# Patient Record
Sex: FEMALE | Race: WHITE | NOT HISPANIC OR LATINO | Employment: OTHER | URBAN - METROPOLITAN AREA
[De-identification: names, ages, dates, MRNs, and addresses within clinical notes are randomized per-mention and may not be internally consistent; named-entity substitution may affect disease eponyms.]

---

## 2017-02-14 ENCOUNTER — TRANSCRIBE ORDERS (OUTPATIENT)
Dept: ADMINISTRATIVE | Facility: HOSPITAL | Age: 57
End: 2017-02-14

## 2017-02-14 DIAGNOSIS — Z12.31 VISIT FOR SCREENING MAMMOGRAM: Primary | ICD-10-CM

## 2017-02-23 ENCOUNTER — HOSPITAL ENCOUNTER (OUTPATIENT)
Dept: MAMMOGRAPHY | Facility: HOSPITAL | Age: 57
Discharge: HOME | End: 2017-02-23
Attending: OBSTETRICS & GYNECOLOGY | Admitting: OBSTETRICS & GYNECOLOGY

## 2017-02-23 DIAGNOSIS — Z12.31 VISIT FOR SCREENING MAMMOGRAM: ICD-10-CM

## 2017-02-23 PROCEDURE — 77063 BREAST TOMOSYNTHESIS BI: CPT

## 2017-02-23 PROCEDURE — 77063 BREAST TOMOSYNTHESIS BI: CPT | Performed by: RADIOLOGY

## 2017-02-23 PROCEDURE — G0202 SCR MAMMO BI INCL CAD: HCPCS

## 2017-02-23 PROCEDURE — 77067 SCR MAMMO BI INCL CAD: CPT | Performed by: RADIOLOGY

## 2017-03-07 ENCOUNTER — APPOINTMENT (OUTPATIENT)
Dept: MAMMOGRAPHY | Facility: HOSPITAL | Age: 57
End: 2017-03-07

## 2017-03-14 ENCOUNTER — HOSPITAL ENCOUNTER (OUTPATIENT)
Dept: MAMMOGRAPHY | Facility: HOSPITAL | Age: 57
Discharge: HOME | End: 2017-03-14
Attending: OBSTETRICS & GYNECOLOGY | Admitting: OBSTETRICS & GYNECOLOGY

## 2017-03-14 DIAGNOSIS — R92.8 ABNORMAL MAMMOGRAM: ICD-10-CM

## 2017-03-14 PROCEDURE — G0206 DX MAMMO INCL CAD UNI: HCPCS

## 2017-03-14 PROCEDURE — G0279 TOMOSYNTHESIS, MAMMO: HCPCS

## 2017-03-14 PROCEDURE — 77065 DX MAMMO INCL CAD UNI: CPT | Performed by: RADIOLOGY

## 2017-03-14 PROCEDURE — 77061 BREAST TOMOSYNTHESIS UNI: CPT | Performed by: RADIOLOGY

## 2018-10-30 ENCOUNTER — TRANSCRIBE ORDERS (OUTPATIENT)
Dept: MAMMOGRAPHY | Facility: HOSPITAL | Age: 58
End: 2018-10-30

## 2018-10-30 DIAGNOSIS — Z12.31 VISIT FOR SCREENING MAMMOGRAM: Primary | ICD-10-CM

## 2018-11-12 ENCOUNTER — APPOINTMENT (OUTPATIENT)
Dept: MAMMOGRAPHY | Facility: HOSPITAL | Age: 58
End: 2018-11-12
Attending: OBSTETRICS & GYNECOLOGY

## 2019-12-19 ENCOUNTER — HOSPITAL ENCOUNTER (INPATIENT)
Facility: HOSPITAL | Age: 59
LOS: 4 days | Discharge: HOME | End: 2019-12-23
Attending: EMERGENCY MEDICINE | Admitting: INTERNAL MEDICINE

## 2019-12-19 ENCOUNTER — APPOINTMENT (OUTPATIENT)
Dept: CT IMAGING | Facility: HOSPITAL | Age: 59
End: 2019-12-19

## 2019-12-19 DIAGNOSIS — K57.32 DIVERTICULITIS OF LARGE INTESTINE WITHOUT BLEEDING, UNSPECIFIED COMPLICATION STATUS: Primary | ICD-10-CM

## 2019-12-19 DIAGNOSIS — K57.92 DIVERTICULITIS: ICD-10-CM

## 2019-12-19 PROBLEM — I10 ESSENTIAL HYPERTENSION: Status: ACTIVE | Noted: 2019-12-19

## 2019-12-19 PROBLEM — E87.6 HYPOKALEMIA: Status: ACTIVE | Noted: 2019-12-19

## 2019-12-19 PROBLEM — F32.A DEPRESSION: Status: ACTIVE | Noted: 2019-12-19

## 2019-12-19 PROBLEM — N17.9 AKI (ACUTE KIDNEY INJURY) (HCC): Status: ACTIVE | Noted: 2019-12-19

## 2019-12-19 PROBLEM — K86.89 PANCREATIC INSUFFICIENCY: Status: ACTIVE | Noted: 2019-12-19

## 2019-12-19 LAB
ALBUMIN SERPL-MCNC: 4.1 G/DL (ref 3.5–5.2)
ALBUMIN/GLOB SERPL: 1.6 G/DL
ALP SERPL-CCNC: 79 U/L (ref 39–117)
ALT SERPL W P-5'-P-CCNC: 7 U/L (ref 1–33)
ANION GAP SERPL CALCULATED.3IONS-SCNC: 13 MMOL/L (ref 5–15)
AST SERPL-CCNC: 11 U/L (ref 1–32)
BASOPHILS # BLD AUTO: 0.03 10*3/MM3 (ref 0–0.2)
BASOPHILS NFR BLD AUTO: 0.3 % (ref 0–1.5)
BILIRUB SERPL-MCNC: 0.4 MG/DL (ref 0.2–1.2)
BUN BLD-MCNC: 11 MG/DL (ref 6–20)
BUN BLDA-MCNC: 11 MG/DL (ref 8–26)
BUN/CREAT SERPL: 10 (ref 7–25)
CA-I BLDA-SCNC: 1.17 MMOL/L (ref 1.2–1.32)
CALCIUM SPEC-SCNC: 9.6 MG/DL (ref 8.6–10.5)
CHLORIDE BLDA-SCNC: 101 MMOL/L (ref 98–109)
CHLORIDE SERPL-SCNC: 99 MMOL/L (ref 98–107)
CO2 BLDA-SCNC: 24 MMOL/L (ref 24–29)
CO2 SERPL-SCNC: 23 MMOL/L (ref 22–29)
CREAT BLD-MCNC: 1.1 MG/DL (ref 0.57–1)
CREAT BLDA-MCNC: 1.3 MG/DL (ref 0.6–1.3)
D-LACTATE SERPL-SCNC: 0.9 MMOL/L (ref 0.5–2)
DEPRECATED RDW RBC AUTO: 42.8 FL (ref 37–54)
EOSINOPHIL # BLD AUTO: 0.15 10*3/MM3 (ref 0–0.4)
EOSINOPHIL NFR BLD AUTO: 1.5 % (ref 0.3–6.2)
ERYTHROCYTE [DISTWIDTH] IN BLOOD BY AUTOMATED COUNT: 13.2 % (ref 12.3–15.4)
GFR SERPL CREATININE-BSD FRML MDRD: 51 ML/MIN/1.73
GLOBULIN UR ELPH-MCNC: 2.6 GM/DL
GLUCOSE BLD-MCNC: 131 MG/DL (ref 65–99)
GLUCOSE BLDC GLUCOMTR-MCNC: 130 MG/DL (ref 70–130)
HCT VFR BLD AUTO: 35.3 % (ref 34–46.6)
HCT VFR BLDA CALC: 35 % (ref 38–51)
HGB BLD-MCNC: 11.8 G/DL (ref 12–15.9)
HGB BLDA-MCNC: 11.9 G/DL (ref 12–17)
IMM GRANULOCYTES # BLD AUTO: 0.02 10*3/MM3 (ref 0–0.05)
IMM GRANULOCYTES NFR BLD AUTO: 0.2 % (ref 0–0.5)
LIPASE SERPL-CCNC: 21 U/L (ref 13–60)
LYMPHOCYTES # BLD AUTO: 3.14 10*3/MM3 (ref 0.7–3.1)
LYMPHOCYTES NFR BLD AUTO: 30.7 % (ref 19.6–45.3)
MAGNESIUM SERPL-MCNC: 1.9 MG/DL (ref 1.6–2.6)
MCH RBC QN AUTO: 29.4 PG (ref 26.6–33)
MCHC RBC AUTO-ENTMCNC: 33.4 G/DL (ref 31.5–35.7)
MCV RBC AUTO: 88 FL (ref 79–97)
MONOCYTES # BLD AUTO: 0.81 10*3/MM3 (ref 0.1–0.9)
MONOCYTES NFR BLD AUTO: 7.9 % (ref 5–12)
NEUTROPHILS # BLD AUTO: 6.07 10*3/MM3 (ref 1.7–7)
NEUTROPHILS NFR BLD AUTO: 59.4 % (ref 42.7–76)
NRBC BLD AUTO-RTO: 0 /100 WBC (ref 0–0.2)
PLATELET # BLD AUTO: 298 10*3/MM3 (ref 140–450)
PMV BLD AUTO: 11.4 FL (ref 6–12)
POTASSIUM BLD-SCNC: 3.5 MMOL/L (ref 3.5–5.2)
POTASSIUM BLDA-SCNC: 3.4 MMOL/L (ref 3.5–4.9)
PROCALCITONIN SERPL-MCNC: 0.06 NG/ML (ref 0.1–0.25)
PROT SERPL-MCNC: 6.7 G/DL (ref 6–8.5)
RBC # BLD AUTO: 4.01 10*6/MM3 (ref 3.77–5.28)
SODIUM BLD-SCNC: 135 MMOL/L (ref 136–145)
SODIUM BLDA-SCNC: 135 MMOL/L (ref 138–146)
WBC NRBC COR # BLD: 10.22 10*3/MM3 (ref 3.4–10.8)

## 2019-12-19 PROCEDURE — 99223 1ST HOSP IP/OBS HIGH 75: CPT | Performed by: INTERNAL MEDICINE

## 2019-12-19 PROCEDURE — 87040 BLOOD CULTURE FOR BACTERIA: CPT | Performed by: PHYSICIAN ASSISTANT

## 2019-12-19 PROCEDURE — 25010000002 HYDROMORPHONE PER 4 MG: Performed by: EMERGENCY MEDICINE

## 2019-12-19 PROCEDURE — 74176 CT ABD & PELVIS W/O CONTRAST: CPT

## 2019-12-19 PROCEDURE — 83690 ASSAY OF LIPASE: CPT | Performed by: PHYSICIAN ASSISTANT

## 2019-12-19 PROCEDURE — 83735 ASSAY OF MAGNESIUM: CPT | Performed by: INTERNAL MEDICINE

## 2019-12-19 PROCEDURE — 83605 ASSAY OF LACTIC ACID: CPT | Performed by: PHYSICIAN ASSISTANT

## 2019-12-19 PROCEDURE — 80047 BASIC METABLC PNL IONIZED CA: CPT

## 2019-12-19 PROCEDURE — 84145 PROCALCITONIN (PCT): CPT | Performed by: PHYSICIAN ASSISTANT

## 2019-12-19 PROCEDURE — 99285 EMERGENCY DEPT VISIT HI MDM: CPT

## 2019-12-19 PROCEDURE — 85025 COMPLETE CBC W/AUTO DIFF WBC: CPT | Performed by: PHYSICIAN ASSISTANT

## 2019-12-19 PROCEDURE — 85014 HEMATOCRIT: CPT

## 2019-12-19 PROCEDURE — 25010000002 ONDANSETRON PER 1 MG: Performed by: PHYSICIAN ASSISTANT

## 2019-12-19 PROCEDURE — 25010000002 HYDROMORPHONE 1 MG/ML SOLUTION: Performed by: EMERGENCY MEDICINE

## 2019-12-19 PROCEDURE — 25010000002 PIPERACILLIN SOD-TAZOBACTAM PER 1 G: Performed by: INTERNAL MEDICINE

## 2019-12-19 PROCEDURE — 80053 COMPREHEN METABOLIC PANEL: CPT | Performed by: PHYSICIAN ASSISTANT

## 2019-12-19 RX ORDER — NALOXONE HCL 0.4 MG/ML
0.4 VIAL (ML) INJECTION
Status: DISCONTINUED | OUTPATIENT
Start: 2019-12-19 | End: 2019-12-22

## 2019-12-19 RX ORDER — OXYBUTYNIN CHLORIDE 5 MG/1
10 TABLET, EXTENDED RELEASE ORAL DAILY
Status: DISCONTINUED | OUTPATIENT
Start: 2019-12-20 | End: 2019-12-23 | Stop reason: HOSPADM

## 2019-12-19 RX ORDER — MORPHINE SULFATE 2 MG/ML
2 INJECTION, SOLUTION INTRAMUSCULAR; INTRAVENOUS ONCE
Status: DISCONTINUED | OUTPATIENT
Start: 2019-12-19 | End: 2019-12-22

## 2019-12-19 RX ORDER — OXYBUTYNIN CHLORIDE 10 MG/1
10 TABLET, EXTENDED RELEASE ORAL DAILY
COMMUNITY
End: 2020-09-11

## 2019-12-19 RX ORDER — CITALOPRAM 40 MG/1
40 TABLET ORAL DAILY
Status: DISCONTINUED | OUTPATIENT
Start: 2019-12-20 | End: 2019-12-23 | Stop reason: HOSPADM

## 2019-12-19 RX ORDER — HYDROMORPHONE HYDROCHLORIDE 1 MG/ML
0.5 INJECTION, SOLUTION INTRAMUSCULAR; INTRAVENOUS; SUBCUTANEOUS
Status: DISCONTINUED | OUTPATIENT
Start: 2019-12-19 | End: 2019-12-22

## 2019-12-19 RX ORDER — DICYCLOMINE HYDROCHLORIDE 10 MG/1
10 CAPSULE ORAL 3 TIMES DAILY
COMMUNITY
End: 2019-12-23 | Stop reason: HOSPADM

## 2019-12-19 RX ORDER — POTASSIUM CHLORIDE 7.45 MG/ML
10 INJECTION INTRAVENOUS
Status: DISCONTINUED | OUTPATIENT
Start: 2019-12-19 | End: 2019-12-23 | Stop reason: HOSPADM

## 2019-12-19 RX ORDER — ASPIRIN 81 MG/1
81 TABLET ORAL DAILY
COMMUNITY
End: 2020-09-11

## 2019-12-19 RX ORDER — SODIUM CHLORIDE 9 MG/ML
75 INJECTION, SOLUTION INTRAVENOUS CONTINUOUS
Status: ACTIVE | OUTPATIENT
Start: 2019-12-19 | End: 2019-12-20

## 2019-12-19 RX ORDER — MAGNESIUM SULFATE HEPTAHYDRATE 40 MG/ML
4 INJECTION, SOLUTION INTRAVENOUS AS NEEDED
Status: DISCONTINUED | OUTPATIENT
Start: 2019-12-19 | End: 2019-12-23 | Stop reason: HOSPADM

## 2019-12-19 RX ORDER — ONDANSETRON 2 MG/ML
4 INJECTION INTRAMUSCULAR; INTRAVENOUS EVERY 6 HOURS PRN
Status: DISCONTINUED | OUTPATIENT
Start: 2019-12-19 | End: 2019-12-23 | Stop reason: HOSPADM

## 2019-12-19 RX ORDER — SODIUM CHLORIDE 0.9 % (FLUSH) 0.9 %
10 SYRINGE (ML) INJECTION AS NEEDED
Status: DISCONTINUED | OUTPATIENT
Start: 2019-12-19 | End: 2019-12-23 | Stop reason: HOSPADM

## 2019-12-19 RX ORDER — MAGNESIUM SULFATE HEPTAHYDRATE 40 MG/ML
2 INJECTION, SOLUTION INTRAVENOUS AS NEEDED
Status: DISCONTINUED | OUTPATIENT
Start: 2019-12-19 | End: 2019-12-23 | Stop reason: HOSPADM

## 2019-12-19 RX ORDER — POTASSIUM CHLORIDE 750 MG/1
40 CAPSULE, EXTENDED RELEASE ORAL AS NEEDED
Status: DISCONTINUED | OUTPATIENT
Start: 2019-12-19 | End: 2019-12-23 | Stop reason: HOSPADM

## 2019-12-19 RX ORDER — ONDANSETRON 2 MG/ML
4 INJECTION INTRAMUSCULAR; INTRAVENOUS ONCE
Status: COMPLETED | OUTPATIENT
Start: 2019-12-19 | End: 2019-12-19

## 2019-12-19 RX ORDER — ONDANSETRON 4 MG/1
4 TABLET, FILM COATED ORAL EVERY 6 HOURS PRN
Status: DISCONTINUED | OUTPATIENT
Start: 2019-12-19 | End: 2019-12-23 | Stop reason: HOSPADM

## 2019-12-19 RX ORDER — ONDANSETRON HYDROCHLORIDE 8 MG/1
8 TABLET, FILM COATED ORAL EVERY 8 HOURS PRN
COMMUNITY
End: 2023-02-23

## 2019-12-19 RX ORDER — SODIUM CHLORIDE 0.9 % (FLUSH) 0.9 %
10 SYRINGE (ML) INJECTION EVERY 12 HOURS SCHEDULED
Status: DISCONTINUED | OUTPATIENT
Start: 2019-12-20 | End: 2019-12-23 | Stop reason: HOSPADM

## 2019-12-19 RX ORDER — VITAMIN E 268 MG
400 CAPSULE ORAL DAILY
COMMUNITY
End: 2019-12-23 | Stop reason: HOSPADM

## 2019-12-19 RX ORDER — CYCLOBENZAPRINE HCL 10 MG
10 TABLET ORAL 3 TIMES DAILY PRN
COMMUNITY
End: 2020-11-10 | Stop reason: HOSPADM

## 2019-12-19 RX ORDER — CITALOPRAM 40 MG/1
40 TABLET ORAL DAILY
COMMUNITY
End: 2022-03-21

## 2019-12-19 RX ORDER — HYDROMORPHONE HYDROCHLORIDE 1 MG/ML
0.5 INJECTION, SOLUTION INTRAMUSCULAR; INTRAVENOUS; SUBCUTANEOUS ONCE
Status: COMPLETED | OUTPATIENT
Start: 2019-12-19 | End: 2019-12-19

## 2019-12-19 RX ORDER — AMOXICILLIN AND CLAVULANATE POTASSIUM 875; 125 MG/1; MG/1
1 TABLET, FILM COATED ORAL 2 TIMES DAILY
COMMUNITY
End: 2019-12-23 | Stop reason: HOSPADM

## 2019-12-19 RX ORDER — CYCLOBENZAPRINE HCL 10 MG
10 TABLET ORAL 3 TIMES DAILY PRN
Status: DISCONTINUED | OUTPATIENT
Start: 2019-12-19 | End: 2019-12-23 | Stop reason: HOSPADM

## 2019-12-19 RX ORDER — BUDESONIDE 3 MG/1
6 CAPSULE, COATED PELLETS ORAL EVERY MORNING
COMMUNITY
End: 2020-09-11

## 2019-12-19 RX ORDER — POTASSIUM CHLORIDE 1.5 G/1.77G
40 POWDER, FOR SOLUTION ORAL AS NEEDED
Status: DISCONTINUED | OUTPATIENT
Start: 2019-12-19 | End: 2019-12-23 | Stop reason: HOSPADM

## 2019-12-19 RX ADMIN — ONDANSETRON 4 MG: 2 INJECTION INTRAMUSCULAR; INTRAVENOUS at 19:20

## 2019-12-19 RX ADMIN — SODIUM CHLORIDE, POTASSIUM CHLORIDE, SODIUM LACTATE AND CALCIUM CHLORIDE 1641 ML: 600; 310; 30; 20 INJECTION, SOLUTION INTRAVENOUS at 19:18

## 2019-12-19 RX ADMIN — HYDROMORPHONE HYDROCHLORIDE 0.5 MG: 1 INJECTION, SOLUTION INTRAMUSCULAR; INTRAVENOUS; SUBCUTANEOUS at 22:29

## 2019-12-19 RX ADMIN — TAZOBACTAM SODIUM AND PIPERACILLIN SODIUM 3.38 G: 375; 3 INJECTION, SOLUTION INTRAVENOUS at 21:42

## 2019-12-19 RX ADMIN — HYDROMORPHONE HYDROCHLORIDE 1 MG: 1 INJECTION, SOLUTION INTRAMUSCULAR; INTRAVENOUS; SUBCUTANEOUS at 19:20

## 2019-12-20 LAB
ALBUMIN SERPL-MCNC: 3.4 G/DL (ref 3.5–5.2)
ALBUMIN/GLOB SERPL: 1.3 G/DL
ALP SERPL-CCNC: 71 U/L (ref 39–117)
ALT SERPL W P-5'-P-CCNC: 6 U/L (ref 1–33)
ANION GAP SERPL CALCULATED.3IONS-SCNC: 10 MMOL/L (ref 5–15)
AST SERPL-CCNC: 10 U/L (ref 1–32)
BASOPHILS # BLD AUTO: 0.02 10*3/MM3 (ref 0–0.2)
BASOPHILS NFR BLD AUTO: 0.2 % (ref 0–1.5)
BILIRUB SERPL-MCNC: 0.5 MG/DL (ref 0.2–1.2)
BUN BLD-MCNC: 9 MG/DL (ref 6–20)
BUN/CREAT SERPL: 8.8 (ref 7–25)
CALCIUM SPEC-SCNC: 9 MG/DL (ref 8.6–10.5)
CHLORIDE SERPL-SCNC: 105 MMOL/L (ref 98–107)
CO2 SERPL-SCNC: 25 MMOL/L (ref 22–29)
CREAT BLD-MCNC: 1.02 MG/DL (ref 0.57–1)
DEPRECATED RDW RBC AUTO: 45 FL (ref 37–54)
EOSINOPHIL # BLD AUTO: 0.15 10*3/MM3 (ref 0–0.4)
EOSINOPHIL NFR BLD AUTO: 1.7 % (ref 0.3–6.2)
ERYTHROCYTE [DISTWIDTH] IN BLOOD BY AUTOMATED COUNT: 13.3 % (ref 12.3–15.4)
GFR SERPL CREATININE-BSD FRML MDRD: 56 ML/MIN/1.73
GLOBULIN UR ELPH-MCNC: 2.7 GM/DL
GLUCOSE BLD-MCNC: 95 MG/DL (ref 65–99)
HCT VFR BLD AUTO: 33 % (ref 34–46.6)
HGB BLD-MCNC: 10.8 G/DL (ref 12–15.9)
IMM GRANULOCYTES # BLD AUTO: 0.03 10*3/MM3 (ref 0–0.05)
IMM GRANULOCYTES NFR BLD AUTO: 0.3 % (ref 0–0.5)
LIPASE SERPL-CCNC: 17 U/L (ref 13–60)
LYMPHOCYTES # BLD AUTO: 2.54 10*3/MM3 (ref 0.7–3.1)
LYMPHOCYTES NFR BLD AUTO: 28.7 % (ref 19.6–45.3)
MAGNESIUM SERPL-MCNC: 2.2 MG/DL (ref 1.6–2.6)
MCH RBC QN AUTO: 29.9 PG (ref 26.6–33)
MCHC RBC AUTO-ENTMCNC: 32.7 G/DL (ref 31.5–35.7)
MCV RBC AUTO: 91.4 FL (ref 79–97)
MONOCYTES # BLD AUTO: 0.75 10*3/MM3 (ref 0.1–0.9)
MONOCYTES NFR BLD AUTO: 8.5 % (ref 5–12)
NEUTROPHILS # BLD AUTO: 5.36 10*3/MM3 (ref 1.7–7)
NEUTROPHILS NFR BLD AUTO: 60.6 % (ref 42.7–76)
NRBC BLD AUTO-RTO: 0 /100 WBC (ref 0–0.2)
NT-PROBNP SERPL-MCNC: 224.4 PG/ML (ref 5–900)
PHOSPHATE SERPL-MCNC: 3.6 MG/DL (ref 2.5–4.5)
PLATELET # BLD AUTO: 259 10*3/MM3 (ref 140–450)
PMV BLD AUTO: 11.2 FL (ref 6–12)
POTASSIUM BLD-SCNC: 3.9 MMOL/L (ref 3.5–5.2)
PROCALCITONIN SERPL-MCNC: 0.05 NG/ML (ref 0.1–0.25)
PROT SERPL-MCNC: 6.1 G/DL (ref 6–8.5)
RBC # BLD AUTO: 3.61 10*6/MM3 (ref 3.77–5.28)
SODIUM BLD-SCNC: 140 MMOL/L (ref 136–145)
WBC NRBC COR # BLD: 8.85 10*3/MM3 (ref 3.4–10.8)

## 2019-12-20 PROCEDURE — 83690 ASSAY OF LIPASE: CPT | Performed by: PHYSICIAN ASSISTANT

## 2019-12-20 PROCEDURE — 80053 COMPREHEN METABOLIC PANEL: CPT | Performed by: PHYSICIAN ASSISTANT

## 2019-12-20 PROCEDURE — 84100 ASSAY OF PHOSPHORUS: CPT | Performed by: PHYSICIAN ASSISTANT

## 2019-12-20 PROCEDURE — 99233 SBSQ HOSP IP/OBS HIGH 50: CPT | Performed by: INTERNAL MEDICINE

## 2019-12-20 PROCEDURE — 83735 ASSAY OF MAGNESIUM: CPT | Performed by: PHYSICIAN ASSISTANT

## 2019-12-20 PROCEDURE — 83880 ASSAY OF NATRIURETIC PEPTIDE: CPT | Performed by: PHYSICIAN ASSISTANT

## 2019-12-20 PROCEDURE — 84145 PROCALCITONIN (PCT): CPT | Performed by: PHYSICIAN ASSISTANT

## 2019-12-20 PROCEDURE — 25010000002 PIPERACILLIN SOD-TAZOBACTAM PER 1 G: Performed by: PHYSICIAN ASSISTANT

## 2019-12-20 PROCEDURE — 25010000002 HYDROMORPHONE PER 4 MG: Performed by: PHYSICIAN ASSISTANT

## 2019-12-20 PROCEDURE — 25010000002 ONDANSETRON PER 1 MG: Performed by: PHYSICIAN ASSISTANT

## 2019-12-20 PROCEDURE — 85025 COMPLETE CBC W/AUTO DIFF WBC: CPT | Performed by: PHYSICIAN ASSISTANT

## 2019-12-20 RX ORDER — OXYCODONE HYDROCHLORIDE AND ACETAMINOPHEN 5; 325 MG/1; MG/1
1 TABLET ORAL EVERY 4 HOURS PRN
Status: DISCONTINUED | OUTPATIENT
Start: 2019-12-20 | End: 2019-12-23 | Stop reason: HOSPADM

## 2019-12-20 RX ADMIN — OXYCODONE HYDROCHLORIDE AND ACETAMINOPHEN 1 TABLET: 5; 325 TABLET ORAL at 22:08

## 2019-12-20 RX ADMIN — TAZOBACTAM SODIUM AND PIPERACILLIN SODIUM 3.38 G: 375; 3 INJECTION, SOLUTION INTRAVENOUS at 09:39

## 2019-12-20 RX ADMIN — HYDROMORPHONE HYDROCHLORIDE 0.5 MG: 1 INJECTION, SOLUTION INTRAMUSCULAR; INTRAVENOUS; SUBCUTANEOUS at 03:36

## 2019-12-20 RX ADMIN — CITALOPRAM HYDROBROMIDE 40 MG: 40 TABLET ORAL at 09:39

## 2019-12-20 RX ADMIN — PANCRELIPASE 24000 UNITS OF LIPASE: 60000; 12000; 38000 CAPSULE, DELAYED RELEASE PELLETS ORAL at 17:18

## 2019-12-20 RX ADMIN — SODIUM CHLORIDE, PRESERVATIVE FREE 10 ML: 5 INJECTION INTRAVENOUS at 09:39

## 2019-12-20 RX ADMIN — SODIUM CHLORIDE 75 ML/HR: 9 INJECTION, SOLUTION INTRAVENOUS at 00:15

## 2019-12-20 RX ADMIN — OXYBUTYNIN CHLORIDE 10 MG: 5 TABLET, EXTENDED RELEASE ORAL at 09:38

## 2019-12-20 RX ADMIN — SODIUM CHLORIDE, PRESERVATIVE FREE 10 ML: 5 INJECTION INTRAVENOUS at 00:15

## 2019-12-20 RX ADMIN — HYDROMORPHONE HYDROCHLORIDE 0.5 MG: 1 INJECTION, SOLUTION INTRAMUSCULAR; INTRAVENOUS; SUBCUTANEOUS at 14:30

## 2019-12-20 RX ADMIN — HYDROMORPHONE HYDROCHLORIDE 0.5 MG: 1 INJECTION, SOLUTION INTRAMUSCULAR; INTRAVENOUS; SUBCUTANEOUS at 09:53

## 2019-12-20 RX ADMIN — TAZOBACTAM SODIUM AND PIPERACILLIN SODIUM 3.38 G: 375; 3 INJECTION, SOLUTION INTRAVENOUS at 01:44

## 2019-12-20 RX ADMIN — ONDANSETRON 4 MG: 2 INJECTION INTRAMUSCULAR; INTRAVENOUS at 17:24

## 2019-12-20 RX ADMIN — ONDANSETRON 4 MG: 2 INJECTION INTRAMUSCULAR; INTRAVENOUS at 00:15

## 2019-12-20 RX ADMIN — HYDROMORPHONE HYDROCHLORIDE 0.5 MG: 1 INJECTION, SOLUTION INTRAMUSCULAR; INTRAVENOUS; SUBCUTANEOUS at 17:24

## 2019-12-20 RX ADMIN — SODIUM CHLORIDE, PRESERVATIVE FREE 10 ML: 5 INJECTION INTRAVENOUS at 22:02

## 2019-12-20 RX ADMIN — TAZOBACTAM SODIUM AND PIPERACILLIN SODIUM 3.38 G: 375; 3 INJECTION, SOLUTION INTRAVENOUS at 17:18

## 2019-12-21 PROCEDURE — 99232 SBSQ HOSP IP/OBS MODERATE 35: CPT | Performed by: INTERNAL MEDICINE

## 2019-12-21 PROCEDURE — 25010000002 HYDROMORPHONE PER 4 MG: Performed by: PHYSICIAN ASSISTANT

## 2019-12-21 PROCEDURE — 25010000002 ONDANSETRON PER 1 MG: Performed by: PHYSICIAN ASSISTANT

## 2019-12-21 PROCEDURE — 25010000002 PIPERACILLIN SOD-TAZOBACTAM PER 1 G: Performed by: PHYSICIAN ASSISTANT

## 2019-12-21 RX ADMIN — ONDANSETRON 4 MG: 2 INJECTION INTRAMUSCULAR; INTRAVENOUS at 19:54

## 2019-12-21 RX ADMIN — HYDROMORPHONE HYDROCHLORIDE 0.5 MG: 1 INJECTION, SOLUTION INTRAMUSCULAR; INTRAVENOUS; SUBCUTANEOUS at 08:25

## 2019-12-21 RX ADMIN — PANCRELIPASE 24000 UNITS OF LIPASE: 60000; 12000; 38000 CAPSULE, DELAYED RELEASE PELLETS ORAL at 08:18

## 2019-12-21 RX ADMIN — OXYCODONE HYDROCHLORIDE AND ACETAMINOPHEN 1 TABLET: 5; 325 TABLET ORAL at 05:22

## 2019-12-21 RX ADMIN — HYDROMORPHONE HYDROCHLORIDE 0.5 MG: 1 INJECTION, SOLUTION INTRAMUSCULAR; INTRAVENOUS; SUBCUTANEOUS at 19:02

## 2019-12-21 RX ADMIN — ONDANSETRON 4 MG: 2 INJECTION INTRAMUSCULAR; INTRAVENOUS at 10:16

## 2019-12-21 RX ADMIN — TAZOBACTAM SODIUM AND PIPERACILLIN SODIUM 3.38 G: 375; 3 INJECTION, SOLUTION INTRAVENOUS at 10:11

## 2019-12-21 RX ADMIN — SODIUM CHLORIDE, PRESERVATIVE FREE 10 ML: 5 INJECTION INTRAVENOUS at 08:18

## 2019-12-21 RX ADMIN — PANCRELIPASE 24000 UNITS OF LIPASE: 60000; 12000; 38000 CAPSULE, DELAYED RELEASE PELLETS ORAL at 17:00

## 2019-12-21 RX ADMIN — CITALOPRAM HYDROBROMIDE 40 MG: 40 TABLET ORAL at 08:18

## 2019-12-21 RX ADMIN — OXYCODONE HYDROCHLORIDE AND ACETAMINOPHEN 1 TABLET: 5; 325 TABLET ORAL at 10:16

## 2019-12-21 RX ADMIN — HYDROMORPHONE HYDROCHLORIDE 0.5 MG: 1 INJECTION, SOLUTION INTRAMUSCULAR; INTRAVENOUS; SUBCUTANEOUS at 13:49

## 2019-12-21 RX ADMIN — TAZOBACTAM SODIUM AND PIPERACILLIN SODIUM 3.38 G: 375; 3 INJECTION, SOLUTION INTRAVENOUS at 17:00

## 2019-12-21 RX ADMIN — TAZOBACTAM SODIUM AND PIPERACILLIN SODIUM 3.38 G: 375; 3 INJECTION, SOLUTION INTRAVENOUS at 02:00

## 2019-12-21 RX ADMIN — PANCRELIPASE 24000 UNITS OF LIPASE: 60000; 12000; 38000 CAPSULE, DELAYED RELEASE PELLETS ORAL at 13:50

## 2019-12-21 RX ADMIN — OXYBUTYNIN CHLORIDE 10 MG: 5 TABLET, EXTENDED RELEASE ORAL at 08:18

## 2019-12-22 LAB
ANION GAP SERPL CALCULATED.3IONS-SCNC: 11 MMOL/L (ref 5–15)
BASOPHILS # BLD MANUAL: 0.07 10*3/MM3 (ref 0–0.2)
BASOPHILS NFR BLD AUTO: 1 % (ref 0–1.5)
BUN BLD-MCNC: 6 MG/DL (ref 6–20)
BUN/CREAT SERPL: 6.3 (ref 7–25)
CALCIUM SPEC-SCNC: 8.8 MG/DL (ref 8.6–10.5)
CHLORIDE SERPL-SCNC: 105 MMOL/L (ref 98–107)
CO2 SERPL-SCNC: 25 MMOL/L (ref 22–29)
CREAT BLD-MCNC: 0.96 MG/DL (ref 0.57–1)
DEPRECATED RDW RBC AUTO: 44.7 FL (ref 37–54)
EOSINOPHIL # BLD MANUAL: 0.07 10*3/MM3 (ref 0–0.4)
EOSINOPHIL NFR BLD MANUAL: 1 % (ref 0.3–6.2)
ERYTHROCYTE [DISTWIDTH] IN BLOOD BY AUTOMATED COUNT: 13.2 % (ref 12.3–15.4)
GFR SERPL CREATININE-BSD FRML MDRD: 60 ML/MIN/1.73
GLUCOSE BLD-MCNC: 85 MG/DL (ref 65–99)
HCT VFR BLD AUTO: 32.4 % (ref 34–46.6)
HGB BLD-MCNC: 10.5 G/DL (ref 12–15.9)
LYMPHOCYTES # BLD MANUAL: 2.45 10*3/MM3 (ref 0.7–3.1)
LYMPHOCYTES NFR BLD MANUAL: 3 % (ref 5–12)
LYMPHOCYTES NFR BLD MANUAL: 35 % (ref 19.6–45.3)
MCH RBC QN AUTO: 29.7 PG (ref 26.6–33)
MCHC RBC AUTO-ENTMCNC: 32.4 G/DL (ref 31.5–35.7)
MCV RBC AUTO: 91.5 FL (ref 79–97)
MONOCYTES # BLD AUTO: 0.21 10*3/MM3 (ref 0.1–0.9)
NEUTROPHILS # BLD AUTO: 3.79 10*3/MM3 (ref 1.7–7)
NEUTROPHILS NFR BLD MANUAL: 54 % (ref 42.7–76)
PLAT MORPH BLD: NORMAL
PLATELET # BLD AUTO: 268 10*3/MM3 (ref 140–450)
PMV BLD AUTO: 11.2 FL (ref 6–12)
POTASSIUM BLD-SCNC: 3.7 MMOL/L (ref 3.5–5.2)
RBC # BLD AUTO: 3.54 10*6/MM3 (ref 3.77–5.28)
RBC MORPH BLD: NORMAL
SCAN SLIDE: NORMAL
SODIUM BLD-SCNC: 141 MMOL/L (ref 136–145)
VARIANT LYMPHS NFR BLD MANUAL: 6 % (ref 0–5)
WBC MORPH BLD: NORMAL
WBC NRBC COR # BLD: 7.01 10*3/MM3 (ref 3.4–10.8)

## 2019-12-22 PROCEDURE — 80048 BASIC METABOLIC PNL TOTAL CA: CPT | Performed by: INTERNAL MEDICINE

## 2019-12-22 PROCEDURE — 85007 BL SMEAR W/DIFF WBC COUNT: CPT | Performed by: INTERNAL MEDICINE

## 2019-12-22 PROCEDURE — 85025 COMPLETE CBC W/AUTO DIFF WBC: CPT | Performed by: INTERNAL MEDICINE

## 2019-12-22 PROCEDURE — 25010000002 PIPERACILLIN SOD-TAZOBACTAM PER 1 G: Performed by: PHYSICIAN ASSISTANT

## 2019-12-22 PROCEDURE — 99232 SBSQ HOSP IP/OBS MODERATE 35: CPT | Performed by: INTERNAL MEDICINE

## 2019-12-22 RX ORDER — HYDROMORPHONE HYDROCHLORIDE 1 MG/ML
0.5 INJECTION, SOLUTION INTRAMUSCULAR; INTRAVENOUS; SUBCUTANEOUS EVERY 4 HOURS PRN
Status: DISCONTINUED | OUTPATIENT
Start: 2019-12-22 | End: 2019-12-23 | Stop reason: HOSPADM

## 2019-12-22 RX ADMIN — SODIUM CHLORIDE, PRESERVATIVE FREE 10 ML: 5 INJECTION INTRAVENOUS at 20:05

## 2019-12-22 RX ADMIN — TAZOBACTAM SODIUM AND PIPERACILLIN SODIUM 3.38 G: 375; 3 INJECTION, SOLUTION INTRAVENOUS at 02:03

## 2019-12-22 RX ADMIN — OXYCODONE HYDROCHLORIDE AND ACETAMINOPHEN 1 TABLET: 5; 325 TABLET ORAL at 13:50

## 2019-12-22 RX ADMIN — PANCRELIPASE 24000 UNITS OF LIPASE: 60000; 12000; 38000 CAPSULE, DELAYED RELEASE PELLETS ORAL at 11:22

## 2019-12-22 RX ADMIN — TAZOBACTAM SODIUM AND PIPERACILLIN SODIUM 3.38 G: 375; 3 INJECTION, SOLUTION INTRAVENOUS at 11:22

## 2019-12-22 RX ADMIN — CITALOPRAM HYDROBROMIDE 40 MG: 40 TABLET ORAL at 08:36

## 2019-12-22 RX ADMIN — OXYCODONE HYDROCHLORIDE AND ACETAMINOPHEN 1 TABLET: 5; 325 TABLET ORAL at 09:41

## 2019-12-22 RX ADMIN — PANCRELIPASE 24000 UNITS OF LIPASE: 60000; 12000; 38000 CAPSULE, DELAYED RELEASE PELLETS ORAL at 08:36

## 2019-12-22 RX ADMIN — OXYCODONE HYDROCHLORIDE AND ACETAMINOPHEN 1 TABLET: 5; 325 TABLET ORAL at 00:23

## 2019-12-22 RX ADMIN — ONDANSETRON HYDROCHLORIDE 4 MG: 4 TABLET, FILM COATED ORAL at 13:50

## 2019-12-22 RX ADMIN — TAZOBACTAM SODIUM AND PIPERACILLIN SODIUM 3.38 G: 375; 3 INJECTION, SOLUTION INTRAVENOUS at 17:31

## 2019-12-22 RX ADMIN — PANCRELIPASE 24000 UNITS OF LIPASE: 60000; 12000; 38000 CAPSULE, DELAYED RELEASE PELLETS ORAL at 17:31

## 2019-12-22 RX ADMIN — OXYBUTYNIN CHLORIDE 10 MG: 5 TABLET, EXTENDED RELEASE ORAL at 08:36

## 2019-12-23 VITALS
WEIGHT: 179.8 LBS | SYSTOLIC BLOOD PRESSURE: 141 MMHG | DIASTOLIC BLOOD PRESSURE: 79 MMHG | HEIGHT: 64 IN | RESPIRATION RATE: 18 BRPM | TEMPERATURE: 98 F | HEART RATE: 83 BPM | OXYGEN SATURATION: 99 % | BODY MASS INDEX: 30.7 KG/M2

## 2019-12-23 LAB
ANION GAP SERPL CALCULATED.3IONS-SCNC: 10 MMOL/L (ref 5–15)
BASOPHILS # BLD AUTO: 0.01 10*3/MM3 (ref 0–0.2)
BASOPHILS NFR BLD AUTO: 0.2 % (ref 0–1.5)
BUN BLD-MCNC: 7 MG/DL (ref 6–20)
BUN/CREAT SERPL: 6.9 (ref 7–25)
CALCIUM SPEC-SCNC: 9.3 MG/DL (ref 8.6–10.5)
CHLORIDE SERPL-SCNC: 107 MMOL/L (ref 98–107)
CO2 SERPL-SCNC: 24 MMOL/L (ref 22–29)
CREAT BLD-MCNC: 1.01 MG/DL (ref 0.57–1)
DEPRECATED RDW RBC AUTO: 42.8 FL (ref 37–54)
EOSINOPHIL # BLD AUTO: 0.04 10*3/MM3 (ref 0–0.4)
EOSINOPHIL NFR BLD AUTO: 0.7 % (ref 0.3–6.2)
ERYTHROCYTE [DISTWIDTH] IN BLOOD BY AUTOMATED COUNT: 13.2 % (ref 12.3–15.4)
GFR SERPL CREATININE-BSD FRML MDRD: 56 ML/MIN/1.73
GLUCOSE BLD-MCNC: 82 MG/DL (ref 65–99)
HCT VFR BLD AUTO: 33.4 % (ref 34–46.6)
HGB BLD-MCNC: 10.9 G/DL (ref 12–15.9)
IMM GRANULOCYTES # BLD AUTO: 0.01 10*3/MM3 (ref 0–0.05)
IMM GRANULOCYTES NFR BLD AUTO: 0.2 % (ref 0–0.5)
LYMPHOCYTES # BLD AUTO: 2.49 10*3/MM3 (ref 0.7–3.1)
LYMPHOCYTES NFR BLD AUTO: 43.9 % (ref 19.6–45.3)
MCH RBC QN AUTO: 29 PG (ref 26.6–33)
MCHC RBC AUTO-ENTMCNC: 32.6 G/DL (ref 31.5–35.7)
MCV RBC AUTO: 88.8 FL (ref 79–97)
MONOCYTES # BLD AUTO: 0.38 10*3/MM3 (ref 0.1–0.9)
MONOCYTES NFR BLD AUTO: 6.7 % (ref 5–12)
NEUTROPHILS # BLD AUTO: 2.74 10*3/MM3 (ref 1.7–7)
NEUTROPHILS NFR BLD AUTO: 48.3 % (ref 42.7–76)
NRBC BLD AUTO-RTO: 0 /100 WBC (ref 0–0.2)
PLATELET # BLD AUTO: 294 10*3/MM3 (ref 140–450)
PMV BLD AUTO: 11.5 FL (ref 6–12)
POTASSIUM BLD-SCNC: 3.9 MMOL/L (ref 3.5–5.2)
RBC # BLD AUTO: 3.76 10*6/MM3 (ref 3.77–5.28)
SODIUM BLD-SCNC: 141 MMOL/L (ref 136–145)
WBC NRBC COR # BLD: 5.67 10*3/MM3 (ref 3.4–10.8)

## 2019-12-23 PROCEDURE — 99239 HOSP IP/OBS DSCHRG MGMT >30: CPT | Performed by: INTERNAL MEDICINE

## 2019-12-23 PROCEDURE — 85007 BL SMEAR W/DIFF WBC COUNT: CPT | Performed by: INTERNAL MEDICINE

## 2019-12-23 PROCEDURE — 25010000002 ERTAPENEM PER 500 MG: Performed by: INTERNAL MEDICINE

## 2019-12-23 PROCEDURE — 25010000002 PIPERACILLIN SOD-TAZOBACTAM PER 1 G: Performed by: PHYSICIAN ASSISTANT

## 2019-12-23 PROCEDURE — 85025 COMPLETE CBC W/AUTO DIFF WBC: CPT | Performed by: INTERNAL MEDICINE

## 2019-12-23 PROCEDURE — 80048 BASIC METABOLIC PNL TOTAL CA: CPT | Performed by: INTERNAL MEDICINE

## 2019-12-23 RX ORDER — ONDANSETRON 4 MG/1
4 TABLET, FILM COATED ORAL EVERY 6 HOURS PRN
Qty: 20 TABLET | Refills: 0 | Status: SHIPPED | OUTPATIENT
Start: 2019-12-23 | End: 2020-09-11

## 2019-12-23 RX ORDER — SENNA AND DOCUSATE SODIUM 50; 8.6 MG/1; MG/1
1 TABLET, FILM COATED ORAL DAILY
Qty: 20 TABLET | Refills: 0 | Status: SHIPPED | OUTPATIENT
Start: 2019-12-23 | End: 2020-09-11

## 2019-12-23 RX ORDER — OXYCODONE HYDROCHLORIDE AND ACETAMINOPHEN 5; 325 MG/1; MG/1
1 TABLET ORAL EVERY 4 HOURS PRN
Qty: 18 TABLET | Refills: 0 | Status: SHIPPED | OUTPATIENT
Start: 2019-12-23 | End: 2019-12-30

## 2019-12-23 RX ADMIN — ONDANSETRON HYDROCHLORIDE 4 MG: 4 TABLET, FILM COATED ORAL at 12:34

## 2019-12-23 RX ADMIN — PANCRELIPASE 24000 UNITS OF LIPASE: 60000; 12000; 38000 CAPSULE, DELAYED RELEASE PELLETS ORAL at 09:26

## 2019-12-23 RX ADMIN — CITALOPRAM HYDROBROMIDE 40 MG: 40 TABLET ORAL at 09:26

## 2019-12-23 RX ADMIN — OXYBUTYNIN CHLORIDE 10 MG: 5 TABLET, EXTENDED RELEASE ORAL at 09:26

## 2019-12-23 RX ADMIN — TAZOBACTAM SODIUM AND PIPERACILLIN SODIUM 3.38 G: 375; 3 INJECTION, SOLUTION INTRAVENOUS at 09:26

## 2019-12-23 RX ADMIN — PANCRELIPASE 24000 UNITS OF LIPASE: 60000; 12000; 38000 CAPSULE, DELAYED RELEASE PELLETS ORAL at 12:29

## 2019-12-23 RX ADMIN — OXYCODONE HYDROCHLORIDE AND ACETAMINOPHEN 1 TABLET: 5; 325 TABLET ORAL at 10:41

## 2019-12-23 RX ADMIN — ERTAPENEM SODIUM 1 G: 1 INJECTION, POWDER, LYOPHILIZED, FOR SOLUTION INTRAMUSCULAR; INTRAVENOUS at 12:29

## 2019-12-23 RX ADMIN — TAZOBACTAM SODIUM AND PIPERACILLIN SODIUM 3.38 G: 375; 3 INJECTION, SOLUTION INTRAVENOUS at 02:26

## 2019-12-24 ENCOUNTER — READMISSION MANAGEMENT (OUTPATIENT)
Dept: CALL CENTER | Facility: HOSPITAL | Age: 59
End: 2019-12-24

## 2019-12-24 LAB
BACTERIA SPEC AEROBE CULT: NORMAL
BACTERIA SPEC AEROBE CULT: NORMAL

## 2019-12-24 NOTE — OUTREACH NOTE
Prep Survey      Responses   Facility patient discharged from?  [facility]   Is patient eligible?  Yes   Discharge diagnosis  diverticulitis, pancreatic insuff, HTN   Does the patient have one of the following disease processes/diagnoses(primary or secondary)?  Other   Does the patient have Home health ordered?  No   Is there a DME ordered?  No   Medication alerts for this patient  [facility] daily for infusion   Prep survey completed?  Yes          Rani Louise RN

## 2019-12-26 ENCOUNTER — READMISSION MANAGEMENT (OUTPATIENT)
Dept: CALL CENTER | Facility: HOSPITAL | Age: 59
End: 2019-12-26

## 2019-12-26 NOTE — OUTREACH NOTE
Medical Week 1 Survey      Responses   Facility patient discharged from?  [facility]   Does the patient have one of the following disease processes/diagnoses(primary or secondary)?  Other   Is there a successful TCM telephone encounter documented?  No   Week 1 attempt successful?  No   Unsuccessful attempts  Attempt 1          Brittni Parks RN

## 2019-12-30 ENCOUNTER — READMISSION MANAGEMENT (OUTPATIENT)
Dept: CALL CENTER | Facility: HOSPITAL | Age: 59
End: 2019-12-30

## 2019-12-30 NOTE — OUTREACH NOTE
Medical Week 1 Survey      Responses   Facility patient discharged from?  [facility]   Does the patient have one of the following disease processes/diagnoses(primary or secondary)?  Other   Is there a successful TCM telephone encounter documented?  No   Week 1 attempt successful?  No   Unsuccessful attempts  Attempt 2          Ginger Deng RN

## 2019-12-31 ENCOUNTER — READMISSION MANAGEMENT (OUTPATIENT)
Dept: CALL CENTER | Facility: HOSPITAL | Age: 59
End: 2019-12-31

## 2019-12-31 NOTE — OUTREACH NOTE
Medical Week 2 Survey      Responses   Facility patient discharged from?  [facility]   Does the patient have one of the following disease processes/diagnoses(primary or secondary)?  Other   Week 2 attempt successful?  No   Unsuccessful attempts  Attempt 1          Isacc Germain RN

## 2020-01-08 ENCOUNTER — LAB REQUISITION (OUTPATIENT)
Dept: LAB | Facility: HOSPITAL | Age: 60
End: 2020-01-08

## 2020-01-08 DIAGNOSIS — Z00.00 ROUTINE GENERAL MEDICAL EXAMINATION AT A HEALTH CARE FACILITY: ICD-10-CM

## 2020-01-08 LAB
ALBUMIN SERPL-MCNC: 4.4 G/DL (ref 3.5–5.2)
ALBUMIN/GLOB SERPL: 1.8 G/DL
ALP SERPL-CCNC: 85 U/L (ref 39–117)
ALT SERPL W P-5'-P-CCNC: 9 U/L (ref 1–33)
ANION GAP SERPL CALCULATED.3IONS-SCNC: 16 MMOL/L (ref 5–15)
AST SERPL-CCNC: 14 U/L (ref 1–32)
BASOPHILS # BLD AUTO: 0.02 10*3/MM3 (ref 0–0.2)
BASOPHILS NFR BLD AUTO: 0.3 % (ref 0–1.5)
BILIRUB SERPL-MCNC: 0.2 MG/DL (ref 0.2–1.2)
BUN BLD-MCNC: 10 MG/DL (ref 6–20)
BUN/CREAT SERPL: 10.8 (ref 7–25)
CALCIUM SPEC-SCNC: 9.8 MG/DL (ref 8.6–10.5)
CHLORIDE SERPL-SCNC: 99 MMOL/L (ref 98–107)
CO2 SERPL-SCNC: 25 MMOL/L (ref 22–29)
CREAT BLD-MCNC: 0.93 MG/DL (ref 0.57–1)
CRP SERPL-MCNC: 0.1 MG/DL (ref 0–0.5)
DEPRECATED RDW RBC AUTO: 43.8 FL (ref 37–54)
EOSINOPHIL # BLD AUTO: 0.05 10*3/MM3 (ref 0–0.4)
EOSINOPHIL NFR BLD AUTO: 0.7 % (ref 0.3–6.2)
ERYTHROCYTE [DISTWIDTH] IN BLOOD BY AUTOMATED COUNT: 13 % (ref 12.3–15.4)
ERYTHROCYTE [SEDIMENTATION RATE] IN BLOOD: 13 MM/HR (ref 0–30)
GFR SERPL CREATININE-BSD FRML MDRD: 62 ML/MIN/1.73
GLOBULIN UR ELPH-MCNC: 2.4 GM/DL
GLUCOSE BLD-MCNC: 114 MG/DL (ref 65–99)
HCT VFR BLD AUTO: 38.6 % (ref 34–46.6)
HGB BLD-MCNC: 12.5 G/DL (ref 12–15.9)
IMM GRANULOCYTES # BLD AUTO: 0.01 10*3/MM3 (ref 0–0.05)
IMM GRANULOCYTES NFR BLD AUTO: 0.1 % (ref 0–0.5)
LYMPHOCYTES # BLD AUTO: 3.15 10*3/MM3 (ref 0.7–3.1)
LYMPHOCYTES NFR BLD AUTO: 44.6 % (ref 19.6–45.3)
MCH RBC QN AUTO: 29.8 PG (ref 26.6–33)
MCHC RBC AUTO-ENTMCNC: 32.4 G/DL (ref 31.5–35.7)
MCV RBC AUTO: 92.1 FL (ref 79–97)
MONOCYTES # BLD AUTO: 0.49 10*3/MM3 (ref 0.1–0.9)
MONOCYTES NFR BLD AUTO: 6.9 % (ref 5–12)
NEUTROPHILS # BLD AUTO: 3.35 10*3/MM3 (ref 1.7–7)
NEUTROPHILS NFR BLD AUTO: 47.4 % (ref 42.7–76)
NRBC BLD AUTO-RTO: 0 /100 WBC (ref 0–0.2)
PLATELET # BLD AUTO: 318 10*3/MM3 (ref 140–450)
PMV BLD AUTO: 12.6 FL (ref 6–12)
POTASSIUM BLD-SCNC: 4.4 MMOL/L (ref 3.5–5.2)
PROT SERPL-MCNC: 6.8 G/DL (ref 6–8.5)
RBC # BLD AUTO: 4.19 10*6/MM3 (ref 3.77–5.28)
SODIUM BLD-SCNC: 140 MMOL/L (ref 136–145)
WBC NRBC COR # BLD: 7.07 10*3/MM3 (ref 3.4–10.8)

## 2020-01-08 PROCEDURE — 86140 C-REACTIVE PROTEIN: CPT

## 2020-01-08 PROCEDURE — 85025 COMPLETE CBC W/AUTO DIFF WBC: CPT

## 2020-01-08 PROCEDURE — 80053 COMPREHEN METABOLIC PANEL: CPT

## 2020-01-08 PROCEDURE — 85652 RBC SED RATE AUTOMATED: CPT

## 2020-01-16 ENCOUNTER — HOSPITAL ENCOUNTER (EMERGENCY)
Facility: HOSPITAL | Age: 60
Discharge: HOME | End: 2020-01-16
Attending: EMERGENCY MEDICINE | Admitting: EMERGENCY MEDICINE

## 2020-01-16 ENCOUNTER — APPOINTMENT (OUTPATIENT)
Dept: CT IMAGING | Facility: HOSPITAL | Age: 60
End: 2020-01-16

## 2020-01-16 VITALS
RESPIRATION RATE: 18 BRPM | OXYGEN SATURATION: 93 % | TEMPERATURE: 98.1 F | BODY MASS INDEX: 29.02 KG/M2 | HEART RATE: 72 BPM | HEIGHT: 64 IN | DIASTOLIC BLOOD PRESSURE: 87 MMHG | WEIGHT: 170 LBS | SYSTOLIC BLOOD PRESSURE: 148 MMHG

## 2020-01-16 DIAGNOSIS — R10.32 LLQ ABDOMINAL PAIN: ICD-10-CM

## 2020-01-16 DIAGNOSIS — K57.92 ACUTE DIVERTICULITIS: Primary | ICD-10-CM

## 2020-01-16 LAB
ALBUMIN SERPL-MCNC: 4.2 G/DL (ref 3.5–5.2)
ALBUMIN/GLOB SERPL: 1.4 G/DL
ALP SERPL-CCNC: 77 U/L (ref 39–117)
ALT SERPL W P-5'-P-CCNC: 8 U/L (ref 1–33)
ANION GAP SERPL CALCULATED.3IONS-SCNC: 12 MMOL/L (ref 5–15)
AST SERPL-CCNC: 16 U/L (ref 1–32)
BASOPHILS # BLD AUTO: 0.02 10*3/MM3 (ref 0–0.2)
BASOPHILS NFR BLD AUTO: 0.2 % (ref 0–1.5)
BILIRUB SERPL-MCNC: 0.4 MG/DL (ref 0.2–1.2)
BILIRUB UR QL STRIP: ABNORMAL
BUN BLD-MCNC: 11 MG/DL (ref 6–20)
BUN/CREAT SERPL: 11.7 (ref 7–25)
CALCIUM SPEC-SCNC: 9.7 MG/DL (ref 8.6–10.5)
CHLORIDE SERPL-SCNC: 103 MMOL/L (ref 98–107)
CLARITY UR: ABNORMAL
CO2 SERPL-SCNC: 24 MMOL/L (ref 22–29)
COLOR UR: YELLOW
CREAT BLD-MCNC: 0.94 MG/DL (ref 0.57–1)
CRP SERPL-MCNC: 1.52 MG/DL (ref 0–0.5)
D-LACTATE SERPL-SCNC: 1.1 MMOL/L (ref 0.5–2)
DEPRECATED RDW RBC AUTO: 43 FL (ref 37–54)
EOSINOPHIL # BLD AUTO: 0.18 10*3/MM3 (ref 0–0.4)
EOSINOPHIL NFR BLD AUTO: 2.2 % (ref 0.3–6.2)
ERYTHROCYTE [DISTWIDTH] IN BLOOD BY AUTOMATED COUNT: 13.2 % (ref 12.3–15.4)
ERYTHROCYTE [SEDIMENTATION RATE] IN BLOOD: 34 MM/HR (ref 0–30)
GFR SERPL CREATININE-BSD FRML MDRD: 61 ML/MIN/1.73
GLOBULIN UR ELPH-MCNC: 3 GM/DL
GLUCOSE BLD-MCNC: 108 MG/DL (ref 65–99)
GLUCOSE UR STRIP-MCNC: NEGATIVE MG/DL
HCT VFR BLD AUTO: 37.6 % (ref 34–46.6)
HGB BLD-MCNC: 12.6 G/DL (ref 12–15.9)
HGB UR QL STRIP.AUTO: NEGATIVE
HOLD SPECIMEN: NORMAL
HOLD SPECIMEN: NORMAL
IMM GRANULOCYTES # BLD AUTO: 0.02 10*3/MM3 (ref 0–0.05)
IMM GRANULOCYTES NFR BLD AUTO: 0.2 % (ref 0–0.5)
KETONES UR QL STRIP: ABNORMAL
LEUKOCYTE ESTERASE UR QL STRIP.AUTO: NEGATIVE
LIPASE SERPL-CCNC: 19 U/L (ref 13–60)
LYMPHOCYTES # BLD AUTO: 2.07 10*3/MM3 (ref 0.7–3.1)
LYMPHOCYTES NFR BLD AUTO: 25.2 % (ref 19.6–45.3)
MCH RBC QN AUTO: 29.9 PG (ref 26.6–33)
MCHC RBC AUTO-ENTMCNC: 33.5 G/DL (ref 31.5–35.7)
MCV RBC AUTO: 89.1 FL (ref 79–97)
MONOCYTES # BLD AUTO: 0.56 10*3/MM3 (ref 0.1–0.9)
MONOCYTES NFR BLD AUTO: 6.8 % (ref 5–12)
NEUTROPHILS # BLD AUTO: 5.35 10*3/MM3 (ref 1.7–7)
NEUTROPHILS NFR BLD AUTO: 65.4 % (ref 42.7–76)
NITRITE UR QL STRIP: NEGATIVE
NRBC BLD AUTO-RTO: 0 /100 WBC (ref 0–0.2)
PH UR STRIP.AUTO: 7 [PH] (ref 5–8)
PLAT MORPH BLD: NORMAL
PLATELET # BLD AUTO: 281 10*3/MM3 (ref 140–450)
PMV BLD AUTO: 11.1 FL (ref 6–12)
POTASSIUM BLD-SCNC: 4.2 MMOL/L (ref 3.5–5.2)
PROT SERPL-MCNC: 7.2 G/DL (ref 6–8.5)
PROT UR QL STRIP: ABNORMAL
RBC # BLD AUTO: 4.22 10*6/MM3 (ref 3.77–5.28)
RBC MORPH BLD: NORMAL
SODIUM BLD-SCNC: 139 MMOL/L (ref 136–145)
SP GR UR STRIP: 1.01 (ref 1–1.03)
UROBILINOGEN UR QL STRIP: ABNORMAL
WBC MORPH BLD: NORMAL
WBC NRBC COR # BLD: 8.2 10*3/MM3 (ref 3.4–10.8)
WHOLE BLOOD HOLD SPECIMEN: NORMAL
WHOLE BLOOD HOLD SPECIMEN: NORMAL

## 2020-01-16 PROCEDURE — 25010000002 HYDROMORPHONE PER 4 MG: Performed by: EMERGENCY MEDICINE

## 2020-01-16 PROCEDURE — 80053 COMPREHEN METABOLIC PANEL: CPT | Performed by: EMERGENCY MEDICINE

## 2020-01-16 PROCEDURE — 85007 BL SMEAR W/DIFF WBC COUNT: CPT | Performed by: EMERGENCY MEDICINE

## 2020-01-16 PROCEDURE — 83605 ASSAY OF LACTIC ACID: CPT | Performed by: EMERGENCY MEDICINE

## 2020-01-16 PROCEDURE — 99284 EMERGENCY DEPT VISIT MOD MDM: CPT

## 2020-01-16 PROCEDURE — 25010000002 IOPAMIDOL 61 % SOLUTION: Performed by: EMERGENCY MEDICINE

## 2020-01-16 PROCEDURE — 85652 RBC SED RATE AUTOMATED: CPT | Performed by: PHYSICIAN ASSISTANT

## 2020-01-16 PROCEDURE — 96376 TX/PRO/DX INJ SAME DRUG ADON: CPT

## 2020-01-16 PROCEDURE — 96374 THER/PROPH/DIAG INJ IV PUSH: CPT

## 2020-01-16 PROCEDURE — 85025 COMPLETE CBC W/AUTO DIFF WBC: CPT | Performed by: EMERGENCY MEDICINE

## 2020-01-16 PROCEDURE — 96375 TX/PRO/DX INJ NEW DRUG ADDON: CPT

## 2020-01-16 PROCEDURE — 81003 URINALYSIS AUTO W/O SCOPE: CPT | Performed by: EMERGENCY MEDICINE

## 2020-01-16 PROCEDURE — 25010000002 ONDANSETRON PER 1 MG: Performed by: EMERGENCY MEDICINE

## 2020-01-16 PROCEDURE — 83690 ASSAY OF LIPASE: CPT | Performed by: EMERGENCY MEDICINE

## 2020-01-16 PROCEDURE — 74177 CT ABD & PELVIS W/CONTRAST: CPT

## 2020-01-16 PROCEDURE — 86140 C-REACTIVE PROTEIN: CPT | Performed by: PHYSICIAN ASSISTANT

## 2020-01-16 RX ORDER — CIPROFLOXACIN 500 MG/1
500 TABLET, FILM COATED ORAL EVERY 12 HOURS
Qty: 20 TABLET | Refills: 0 | Status: SHIPPED | OUTPATIENT
Start: 2020-01-16 | End: 2020-09-11

## 2020-01-16 RX ORDER — HYDROMORPHONE HYDROCHLORIDE 1 MG/ML
0.5 INJECTION, SOLUTION INTRAMUSCULAR; INTRAVENOUS; SUBCUTANEOUS ONCE
Status: COMPLETED | OUTPATIENT
Start: 2020-01-16 | End: 2020-01-16

## 2020-01-16 RX ORDER — HYDROCODONE BITARTRATE AND ACETAMINOPHEN 5; 325 MG/1; MG/1
1 TABLET ORAL EVERY 4 HOURS PRN
Qty: 15 TABLET | Refills: 0 | Status: SHIPPED | OUTPATIENT
Start: 2020-01-16 | End: 2020-09-11

## 2020-01-16 RX ORDER — ONDANSETRON 2 MG/ML
4 INJECTION INTRAMUSCULAR; INTRAVENOUS ONCE
Status: COMPLETED | OUTPATIENT
Start: 2020-01-16 | End: 2020-01-16

## 2020-01-16 RX ORDER — HYDROCODONE BITARTRATE AND ACETAMINOPHEN 10; 325 MG/1; MG/1
1 TABLET ORAL ONCE
Status: COMPLETED | OUTPATIENT
Start: 2020-01-16 | End: 2020-01-16

## 2020-01-16 RX ORDER — SODIUM CHLORIDE 0.9 % (FLUSH) 0.9 %
10 SYRINGE (ML) INJECTION AS NEEDED
Status: DISCONTINUED | OUTPATIENT
Start: 2020-01-16 | End: 2020-01-16 | Stop reason: HOSPADM

## 2020-01-16 RX ORDER — METRONIDAZOLE 500 MG/1
500 TABLET ORAL 3 TIMES DAILY
Qty: 30 TABLET | Refills: 0 | Status: SHIPPED | OUTPATIENT
Start: 2020-01-16 | End: 2020-09-11

## 2020-01-16 RX ADMIN — IOPAMIDOL 75 ML: 612 INJECTION, SOLUTION INTRAVENOUS at 14:16

## 2020-01-16 RX ADMIN — HYDROCODONE BITARTRATE AND ACETAMINOPHEN 1 TABLET: 10; 325 TABLET ORAL at 16:00

## 2020-01-16 RX ADMIN — HYDROMORPHONE HYDROCHLORIDE 0.5 MG: 1 INJECTION, SOLUTION INTRAMUSCULAR; INTRAVENOUS; SUBCUTANEOUS at 16:00

## 2020-01-16 RX ADMIN — HYDROMORPHONE HYDROCHLORIDE 0.5 MG: 1 INJECTION, SOLUTION INTRAMUSCULAR; INTRAVENOUS; SUBCUTANEOUS at 13:57

## 2020-01-16 RX ADMIN — ONDANSETRON 4 MG: 2 INJECTION INTRAMUSCULAR; INTRAVENOUS at 13:57

## 2020-01-16 NOTE — ED PROVIDER NOTES
Subjective   Ms. Hubbard is a 59-year-old female that comes to the emergency part today sent here by Dr. Grande for abdominal pain.  She was admitted to the hospital 12/19/2019 for diverticulitis with microperforation.  She was discharged from the hospital on 12/23/2019 and had a week's worth of IV Invanz afterwards.  She was actually discharged from infectious disease, was seen by Dr. Grande last Thursday and discharged from his office and was feeling fine.  Since Monday she been having increasing pain in her lower abdomen in the left lower quadrant in the same spot where she was hurting previously.  She has had no melena hematochezia hematemesis.  She has had no dysuria frequency urgency or hematuria.  Apparently she states that she had talked to Dr. Barney and he sent her here to the emergency department to be reevaluated.  She denies fevers chills or Reiger's associated with this.      History provided by:  Patient and significant other   used: No    Abdominal Pain   Pain location:  LLQ  Pain quality: aching, cramping and dull    Pain radiates to:  Does not radiate  Pain severity:  Severe  Onset quality:  Gradual  Duration:  4 days  Timing:  Intermittent  Progression:  Waxing and waning  Chronicity:  New  Context: not alcohol use, not diet changes, not eating, not laxative use, not previous surgeries, not recent illness, not recent sexual activity, not recent travel, not sick contacts, not suspicious food intake and not trauma    Context comment:  Recent diverticulitis with microperforation  Relieved by:  Nothing  Worsened by:  Nothing  Ineffective treatments:  None tried  Associated symptoms: no anorexia, no chest pain, no chills, no constipation, no cough, no dysuria, no fever, no hematemesis, no hematochezia, no hematuria, no nausea, no shortness of breath, no sore throat, no vaginal bleeding, no vaginal discharge and no vomiting    Risk factors: no alcohol abuse, not elderly, no NSAID  use, not obese and no recent hospitalization        Review of Systems   Constitutional: Negative for chills and fever.   HENT: Negative for sore throat.    Respiratory: Negative for cough, chest tightness, shortness of breath and wheezing.    Cardiovascular: Negative for chest pain.   Gastrointestinal: Positive for abdominal pain. Negative for anorexia, constipation, hematemesis, hematochezia, nausea and vomiting.   Genitourinary: Negative for dysuria, frequency, hematuria, urgency, vaginal bleeding and vaginal discharge.   Musculoskeletal: Negative for back pain and neck pain.   Skin: Negative for pallor and rash.   Psychiatric/Behavioral: Negative.    All other systems reviewed and are negative.      Past Medical History:   Diagnosis Date   • Depression    • Hypertension        Allergies   Allergen Reactions   • Sulfa Antibiotics Hives       Past Surgical History:   Procedure Laterality Date   •  SECTION     • HERNIA REPAIR         History reviewed. No pertinent family history.    Social History     Socioeconomic History   • Marital status:      Spouse name: Not on file   • Number of children: Not on file   • Years of education: Not on file   • Highest education level: Not on file   Tobacco Use   • Smoking status: Former Smoker     Years: 20.00     Last attempt to quit: 2018     Years since quittin.0   Substance and Sexual Activity   • Alcohol use: Not Currently   • Drug use: Not Currently   Social History Narrative    , has a daughter, unemployed           Objective   Physical Exam   Constitutional: She is oriented to person, place, and time. She appears well-developed and well-nourished. No distress.   HENT:   Head: Normocephalic and atraumatic.   Nose: Nose normal.   Eyes: Conjunctivae are normal. No scleral icterus.   Neck: Normal range of motion. Neck supple.   Cardiovascular: Normal rate, regular rhythm, normal heart sounds and intact distal pulses.   No murmur  heard.  Pulmonary/Chest: Effort normal and breath sounds normal. No respiratory distress.   Abdominal: Soft. Bowel sounds are normal. There is no hepatosplenomegaly, splenomegaly or hepatomegaly. There is tenderness in the left lower quadrant. There is guarding and tenderness at McBurney's point. There is no rebound and negative Knowles's sign.   Musculoskeletal: Normal range of motion.   Neurological: She is alert and oriented to person, place, and time.   Skin: Skin is warm and dry. She is not diaphoretic.   Psychiatric: She has a normal mood and affect. Her behavior is normal.   Nursing note and vitals reviewed.      Procedures           ED Course  ED Course as of Jan 16 1631   Thu Jan 16, 2020   1543 Discussed the patient with Dr. Amaya and the patient's colorectal surgeon.  Her pain is much more tolerable after pain medication.  She like to try this at home.  I made an appointment for her next Thursday at 8:15 in the morning.  Discussed with her at length and in detail if her pain gets worse or vomiting is unable keep her medications down or any other complications to return to the emergency department.    [ALHAJI]      ED Course User Index  [ALHAJI] Suman Nicole PA                      Janel Coma Scale Score: 15                          MDM  Number of Diagnoses or Management Options  Acute diverticulitis: new and requires workup     Amount and/or Complexity of Data Reviewed  Clinical lab tests: reviewed and ordered  Tests in the radiology section of CPT®: reviewed and ordered  Tests in the medicine section of CPT®: ordered and reviewed  Discuss the patient with other providers: yes    Patient Progress  Patient progress: stable      Final diagnoses:   Acute diverticulitis   LLQ abdominal pain            Suman Nicole PA  01/16/20 2041

## 2020-03-13 ENCOUNTER — TRANSCRIBE ORDERS (OUTPATIENT)
Dept: MAMMOGRAPHY | Facility: HOSPITAL | Age: 60
End: 2020-03-13

## 2020-03-13 DIAGNOSIS — Z12.31 VISIT FOR SCREENING MAMMOGRAM: Primary | ICD-10-CM

## 2020-04-06 ENCOUNTER — APPOINTMENT (OUTPATIENT)
Dept: MAMMOGRAPHY | Facility: HOSPITAL | Age: 60
End: 2020-04-06

## 2020-08-07 ENCOUNTER — APPOINTMENT (OUTPATIENT)
Dept: PREADMISSION TESTING | Facility: HOSPITAL | Age: 60
End: 2020-08-07

## 2020-08-07 PROCEDURE — U0002 COVID-19 LAB TEST NON-CDC: HCPCS

## 2020-08-07 PROCEDURE — U0004 COV-19 TEST NON-CDC HGH THRU: HCPCS

## 2020-08-07 PROCEDURE — C9803 HOPD COVID-19 SPEC COLLECT: HCPCS

## 2020-08-08 LAB
REF LAB TEST METHOD: NORMAL
SARS-COV-2 RNA RESP QL NAA+PROBE: NOT DETECTED

## 2020-08-11 ENCOUNTER — LAB REQUISITION (OUTPATIENT)
Dept: LAB | Facility: HOSPITAL | Age: 60
End: 2020-08-11

## 2020-08-11 DIAGNOSIS — N87.9 DYSPLASIA OF CERVIX UTERI, UNSPECIFIED: ICD-10-CM

## 2020-08-11 PROCEDURE — 88305 TISSUE EXAM BY PATHOLOGIST: CPT | Performed by: OBSTETRICS & GYNECOLOGY

## 2020-08-11 PROCEDURE — 88307 TISSUE EXAM BY PATHOLOGIST: CPT | Performed by: OBSTETRICS & GYNECOLOGY

## 2020-08-12 LAB
CYTO UR: NORMAL
LAB AP CASE REPORT: NORMAL
LAB AP CLINICAL INFORMATION: NORMAL
PATH REPORT.FINAL DX SPEC: NORMAL
PATH REPORT.GROSS SPEC: NORMAL

## 2020-09-03 ENCOUNTER — TELEPHONE (OUTPATIENT)
Dept: OBSTETRICS AND GYNECOLOGY | Facility: CLINIC | Age: 60
End: 2020-09-03

## 2020-09-03 NOTE — TELEPHONE ENCOUNTER
PT CALLED STATING SHE MISSED A CALL ON 9/2 FROM OUR OFFICE AND ASKED FOR A CALL BACK. PLEASE ADVISE THANKS

## 2020-09-11 ENCOUNTER — OFFICE VISIT (OUTPATIENT)
Dept: OBSTETRICS AND GYNECOLOGY | Facility: CLINIC | Age: 60
End: 2020-09-11

## 2020-09-11 VITALS — WEIGHT: 187.3 LBS | SYSTOLIC BLOOD PRESSURE: 122 MMHG | DIASTOLIC BLOOD PRESSURE: 74 MMHG | BODY MASS INDEX: 32.15 KG/M2

## 2020-09-11 DIAGNOSIS — N95.2 ATROPHIC VAGINITIS: ICD-10-CM

## 2020-09-11 DIAGNOSIS — N87.0 MILD DYSPLASIA OF CERVIX (CIN I): Primary | ICD-10-CM

## 2020-09-11 DIAGNOSIS — N39.46 MIXED STRESS AND URGE URINARY INCONTINENCE: ICD-10-CM

## 2020-09-11 PROBLEM — R32 URINARY INCONTINENCE: Status: ACTIVE | Noted: 2020-09-11

## 2020-09-11 PROCEDURE — 99024 POSTOP FOLLOW-UP VISIT: CPT | Performed by: OBSTETRICS & GYNECOLOGY

## 2020-09-11 NOTE — PROGRESS NOTES
Post-Op Visit    HPI  Daja Hubbard is a 59 y.o. female who returns for a routine post-operative follow-up visit after undergoing Leep excision/conization for abnormal cells on 8/11/2020.      Since the patient was last seen, she reports no problems with eating, bowel movements, voiding, or wound drainage and pain is well controlled.    She states since the procedure, she has shown improvement in their activity level.    Physical Exam   Genitourinary: Cervix does not exhibit discharge. Vagina exhibits loss of rugae. There is erythema in the vagina.   Genitourinary Comments: Cervix well-healed.  No discharge or bleeding.  Cervix flush with vaginal wall.        Assessment:    ICD-10-CM ICD-9-CM   1. Mild dysplasia of cervix (DEV I) N87.0 622.11   2. Atrophic vaginitis N95.2 627.3   3. Mixed stress and urge urinary incontinence N39.46 788.33     Doing well status post LEEP of the cervix  August 11, 2020.  Report shows mild dysplasia ectocervical margin focally involved endocervical margin clear.  Will repeat Pap smear in 3 to 4 months.      Instructions:  1. Repeat Pap smear in 3 months.  Then return to annual Paps once 2- Paps negative.  2. Restart Premarin cream for atrophic vaginitis      Suman Ortiz MD

## 2020-10-06 ENCOUNTER — HOSPITAL ENCOUNTER (INPATIENT)
Facility: HOSPITAL | Age: 60
LOS: 6 days | Discharge: HOME HEALTH | End: 2020-10-12
Attending: EMERGENCY MEDICINE | Admitting: HOSPITALIST

## 2020-10-06 ENCOUNTER — APPOINTMENT (OUTPATIENT)
Dept: CT IMAGING | Facility: HOSPITAL | Age: 60
End: 2020-10-06

## 2020-10-06 DIAGNOSIS — E87.6 HYPOKALEMIA: ICD-10-CM

## 2020-10-06 DIAGNOSIS — N17.9 ACUTE KIDNEY INJURY (HCC): ICD-10-CM

## 2020-10-06 DIAGNOSIS — N17.9 AKI (ACUTE KIDNEY INJURY) (HCC): ICD-10-CM

## 2020-10-06 DIAGNOSIS — A41.9 SEPSIS WITHOUT ACUTE ORGAN DYSFUNCTION, DUE TO UNSPECIFIED ORGANISM (HCC): Primary | ICD-10-CM

## 2020-10-06 DIAGNOSIS — N39.0 URINARY TRACT INFECTION WITHOUT HEMATURIA, SITE UNSPECIFIED: ICD-10-CM

## 2020-10-06 DIAGNOSIS — K57.32 SIGMOID DIVERTICULITIS: ICD-10-CM

## 2020-10-06 DIAGNOSIS — K57.92 ACUTE DIVERTICULITIS: ICD-10-CM

## 2020-10-06 DIAGNOSIS — N95.2 ATROPHIC VAGINITIS: ICD-10-CM

## 2020-10-06 PROBLEM — E87.1 HYPONATREMIA: Status: ACTIVE | Noted: 2020-10-06

## 2020-10-06 PROBLEM — R65.10 SIRS (SYSTEMIC INFLAMMATORY RESPONSE SYNDROME): Status: ACTIVE | Noted: 2020-10-06

## 2020-10-06 LAB
ALBUMIN SERPL-MCNC: 3.9 G/DL (ref 3.5–5.2)
ALBUMIN/GLOB SERPL: 1.3 G/DL
ALP SERPL-CCNC: 93 U/L (ref 39–117)
ALT SERPL W P-5'-P-CCNC: 7 U/L (ref 1–33)
ANION GAP SERPL CALCULATED.3IONS-SCNC: 10 MMOL/L (ref 5–15)
AST SERPL-CCNC: 11 U/L (ref 1–32)
BACTERIA UR QL AUTO: ABNORMAL /HPF
BASOPHILS # BLD AUTO: 0.04 10*3/MM3 (ref 0–0.2)
BASOPHILS NFR BLD AUTO: 0.2 % (ref 0–1.5)
BILIRUB SERPL-MCNC: 0.6 MG/DL (ref 0–1.2)
BILIRUB UR QL STRIP: ABNORMAL
BUN SERPL-MCNC: 17 MG/DL (ref 6–20)
BUN/CREAT SERPL: 14.8 (ref 7–25)
CALCIUM SPEC-SCNC: 8.9 MG/DL (ref 8.6–10.5)
CHLORIDE SERPL-SCNC: 97 MMOL/L (ref 98–107)
CLARITY UR: ABNORMAL
CO2 SERPL-SCNC: 23 MMOL/L (ref 22–29)
COD CRY URNS QL: ABNORMAL /HPF
COLOR UR: ABNORMAL
CREAT SERPL-MCNC: 1.15 MG/DL (ref 0.57–1)
D-LACTATE SERPL-SCNC: 1.1 MMOL/L (ref 0.5–2)
DEPRECATED RDW RBC AUTO: 42.9 FL (ref 37–54)
EOSINOPHIL # BLD AUTO: 0.03 10*3/MM3 (ref 0–0.4)
EOSINOPHIL NFR BLD AUTO: 0.2 % (ref 0.3–6.2)
ERYTHROCYTE [DISTWIDTH] IN BLOOD BY AUTOMATED COUNT: 12.8 % (ref 12.3–15.4)
GFR SERPL CREATININE-BSD FRML MDRD: 48 ML/MIN/1.73
GLOBULIN UR ELPH-MCNC: 3 GM/DL
GLUCOSE SERPL-MCNC: 126 MG/DL (ref 65–99)
GLUCOSE UR STRIP-MCNC: NEGATIVE MG/DL
HCT VFR BLD AUTO: 36.5 % (ref 34–46.6)
HGB BLD-MCNC: 11.9 G/DL (ref 12–15.9)
HGB UR QL STRIP.AUTO: NEGATIVE
HOLD SPECIMEN: NORMAL
HOLD SPECIMEN: NORMAL
HYALINE CASTS UR QL AUTO: ABNORMAL /LPF
IMM GRANULOCYTES # BLD AUTO: 0.07 10*3/MM3 (ref 0–0.05)
IMM GRANULOCYTES NFR BLD AUTO: 0.4 % (ref 0–0.5)
KETONES UR QL STRIP: ABNORMAL
LEUKOCYTE ESTERASE UR QL STRIP.AUTO: ABNORMAL
LIPASE SERPL-CCNC: 14 U/L (ref 13–60)
LYMPHOCYTES # BLD AUTO: 1.59 10*3/MM3 (ref 0.7–3.1)
LYMPHOCYTES NFR BLD AUTO: 9 % (ref 19.6–45.3)
MCH RBC QN AUTO: 29.9 PG (ref 26.6–33)
MCHC RBC AUTO-ENTMCNC: 32.6 G/DL (ref 31.5–35.7)
MCV RBC AUTO: 91.7 FL (ref 79–97)
MONOCYTES # BLD AUTO: 1.11 10*3/MM3 (ref 0.1–0.9)
MONOCYTES NFR BLD AUTO: 6.3 % (ref 5–12)
MUCOUS THREADS URNS QL MICRO: ABNORMAL /HPF
NEUTROPHILS NFR BLD AUTO: 14.87 10*3/MM3 (ref 1.7–7)
NEUTROPHILS NFR BLD AUTO: 83.9 % (ref 42.7–76)
NITRITE UR QL STRIP: POSITIVE
NRBC BLD AUTO-RTO: 0 /100 WBC (ref 0–0.2)
PH UR STRIP.AUTO: 5.5 [PH] (ref 5–8)
PLATELET # BLD AUTO: 235 10*3/MM3 (ref 140–450)
PMV BLD AUTO: 11.6 FL (ref 6–12)
POTASSIUM SERPL-SCNC: 3.9 MMOL/L (ref 3.5–5.2)
PROCALCITONIN SERPL-MCNC: 0.81 NG/ML (ref 0–0.25)
PROT SERPL-MCNC: 6.9 G/DL (ref 6–8.5)
PROT UR QL STRIP: ABNORMAL
RBC # BLD AUTO: 3.98 10*6/MM3 (ref 3.77–5.28)
RBC # UR: ABNORMAL /HPF
REF LAB TEST METHOD: ABNORMAL
SODIUM SERPL-SCNC: 130 MMOL/L (ref 136–145)
SP GR UR STRIP: 1.04 (ref 1–1.03)
SQUAMOUS #/AREA URNS HPF: ABNORMAL /HPF
UROBILINOGEN UR QL STRIP: ABNORMAL
WBC # BLD AUTO: 17.71 10*3/MM3 (ref 3.4–10.8)
WBC UR QL AUTO: ABNORMAL /HPF
WHOLE BLOOD HOLD SPECIMEN: NORMAL
WHOLE BLOOD HOLD SPECIMEN: NORMAL

## 2020-10-06 PROCEDURE — 25010000002 PIPERACILLIN SOD-TAZOBACTAM PER 1 G: Performed by: INTERNAL MEDICINE

## 2020-10-06 PROCEDURE — 80053 COMPREHEN METABOLIC PANEL: CPT | Performed by: EMERGENCY MEDICINE

## 2020-10-06 PROCEDURE — 83690 ASSAY OF LIPASE: CPT | Performed by: EMERGENCY MEDICINE

## 2020-10-06 PROCEDURE — 25010000002 IOPAMIDOL 61 % SOLUTION: Performed by: EMERGENCY MEDICINE

## 2020-10-06 PROCEDURE — 85025 COMPLETE CBC W/AUTO DIFF WBC: CPT | Performed by: EMERGENCY MEDICINE

## 2020-10-06 PROCEDURE — 99284 EMERGENCY DEPT VISIT MOD MDM: CPT

## 2020-10-06 PROCEDURE — 74177 CT ABD & PELVIS W/CONTRAST: CPT

## 2020-10-06 PROCEDURE — 81001 URINALYSIS AUTO W/SCOPE: CPT | Performed by: EMERGENCY MEDICINE

## 2020-10-06 PROCEDURE — 84145 PROCALCITONIN (PCT): CPT | Performed by: PHYSICIAN ASSISTANT

## 2020-10-06 PROCEDURE — 25010000002 PIPERACILLIN SOD-TAZOBACTAM PER 1 G: Performed by: PHYSICIAN ASSISTANT

## 2020-10-06 PROCEDURE — U0004 COV-19 TEST NON-CDC HGH THRU: HCPCS | Performed by: INTERNAL MEDICINE

## 2020-10-06 PROCEDURE — 87086 URINE CULTURE/COLONY COUNT: CPT | Performed by: PHYSICIAN ASSISTANT

## 2020-10-06 PROCEDURE — 25010000002 ONDANSETRON PER 1 MG: Performed by: PHYSICIAN ASSISTANT

## 2020-10-06 PROCEDURE — 99223 1ST HOSP IP/OBS HIGH 75: CPT | Performed by: INTERNAL MEDICINE

## 2020-10-06 PROCEDURE — 25010000002 ENOXAPARIN PER 10 MG: Performed by: INTERNAL MEDICINE

## 2020-10-06 PROCEDURE — 25010000002 HYDROMORPHONE PER 4 MG: Performed by: EMERGENCY MEDICINE

## 2020-10-06 PROCEDURE — 87040 BLOOD CULTURE FOR BACTERIA: CPT | Performed by: PHYSICIAN ASSISTANT

## 2020-10-06 PROCEDURE — 83605 ASSAY OF LACTIC ACID: CPT | Performed by: PHYSICIAN ASSISTANT

## 2020-10-06 RX ORDER — OXYCODONE AND ACETAMINOPHEN 7.5; 325 MG/1; MG/1
1 TABLET ORAL EVERY 4 HOURS PRN
Status: DISCONTINUED | OUTPATIENT
Start: 2020-10-06 | End: 2020-10-12 | Stop reason: HOSPADM

## 2020-10-06 RX ORDER — SODIUM CHLORIDE 9 MG/ML
100 INJECTION, SOLUTION INTRAVENOUS CONTINUOUS
Status: DISCONTINUED | OUTPATIENT
Start: 2020-10-06 | End: 2020-10-07

## 2020-10-06 RX ORDER — SODIUM CHLORIDE 0.9 % (FLUSH) 0.9 %
10 SYRINGE (ML) INJECTION AS NEEDED
Status: DISCONTINUED | OUTPATIENT
Start: 2020-10-06 | End: 2020-10-12 | Stop reason: HOSPADM

## 2020-10-06 RX ORDER — CITALOPRAM 40 MG/1
40 TABLET ORAL DAILY
Status: DISCONTINUED | OUTPATIENT
Start: 2020-10-06 | End: 2020-10-12 | Stop reason: HOSPADM

## 2020-10-06 RX ORDER — HYDROMORPHONE HYDROCHLORIDE 1 MG/ML
0.5 INJECTION, SOLUTION INTRAMUSCULAR; INTRAVENOUS; SUBCUTANEOUS
Status: COMPLETED | OUTPATIENT
Start: 2020-10-06 | End: 2020-10-06

## 2020-10-06 RX ORDER — CYCLOBENZAPRINE HCL 10 MG
10 TABLET ORAL 3 TIMES DAILY PRN
Status: DISCONTINUED | OUTPATIENT
Start: 2020-10-06 | End: 2020-10-12 | Stop reason: HOSPADM

## 2020-10-06 RX ORDER — HYDROMORPHONE HYDROCHLORIDE 1 MG/ML
0.5 INJECTION, SOLUTION INTRAMUSCULAR; INTRAVENOUS; SUBCUTANEOUS
Status: DISCONTINUED | OUTPATIENT
Start: 2020-10-06 | End: 2020-10-12 | Stop reason: HOSPADM

## 2020-10-06 RX ORDER — SODIUM CHLORIDE 0.9 % (FLUSH) 0.9 %
10 SYRINGE (ML) INJECTION EVERY 12 HOURS SCHEDULED
Status: DISCONTINUED | OUTPATIENT
Start: 2020-10-06 | End: 2020-10-12 | Stop reason: HOSPADM

## 2020-10-06 RX ORDER — ONDANSETRON 2 MG/ML
4 INJECTION INTRAMUSCULAR; INTRAVENOUS ONCE
Status: COMPLETED | OUTPATIENT
Start: 2020-10-06 | End: 2020-10-06

## 2020-10-06 RX ADMIN — TAZOBACTAM SODIUM AND PIPERACILLIN SODIUM 3.38 G: 375; 3 INJECTION, SOLUTION INTRAVENOUS at 11:20

## 2020-10-06 RX ADMIN — SODIUM CHLORIDE 1000 ML: 9 INJECTION, SOLUTION INTRAVENOUS at 10:19

## 2020-10-06 RX ADMIN — IOPAMIDOL 95 ML: 612 INJECTION, SOLUTION INTRAVENOUS at 12:10

## 2020-10-06 RX ADMIN — ENOXAPARIN SODIUM 40 MG: 40 INJECTION SUBCUTANEOUS at 20:45

## 2020-10-06 RX ADMIN — OXYCODONE HYDROCHLORIDE AND ACETAMINOPHEN 1 TABLET: 7.5; 325 TABLET ORAL at 21:35

## 2020-10-06 RX ADMIN — ONDANSETRON 4 MG: 2 INJECTION INTRAMUSCULAR; INTRAVENOUS at 10:20

## 2020-10-06 RX ADMIN — SODIUM CHLORIDE 100 ML/HR: 9 INJECTION, SOLUTION INTRAVENOUS at 16:59

## 2020-10-06 RX ADMIN — HYDROMORPHONE HYDROCHLORIDE 0.5 MG: 1 INJECTION, SOLUTION INTRAMUSCULAR; INTRAVENOUS; SUBCUTANEOUS at 14:28

## 2020-10-06 RX ADMIN — OXYCODONE HYDROCHLORIDE AND ACETAMINOPHEN 1 TABLET: 7.5; 325 TABLET ORAL at 16:59

## 2020-10-06 RX ADMIN — TAZOBACTAM SODIUM AND PIPERACILLIN SODIUM 3.38 G: 375; 3 INJECTION, SOLUTION INTRAVENOUS at 18:11

## 2020-10-06 RX ADMIN — HYDROMORPHONE HYDROCHLORIDE 0.5 MG: 1 INJECTION, SOLUTION INTRAMUSCULAR; INTRAVENOUS; SUBCUTANEOUS at 11:26

## 2020-10-06 RX ADMIN — HYDROMORPHONE HYDROCHLORIDE 0.5 MG: 1 INJECTION, SOLUTION INTRAMUSCULAR; INTRAVENOUS; SUBCUTANEOUS at 10:19

## 2020-10-06 NOTE — PLAN OF CARE
Goal Outcome Evaluation:   VSS. Pt arrived from ED. C/o of LLQ pain. PRN pain meds given. Pt is tolerating clear liquid diet. Will continue to monitor.

## 2020-10-06 NOTE — ED PROVIDER NOTES
" EMERGENCY DEPARTMENT ENCOUNTER    Room Number:  14  Date of encounter:  10/6/2020  PCP: Sp Marie MD  Historian: Patient      HPI:  Chief Complaint: Left lower quadrant abdominal pain    A complete HPI/ROS/PMH/PSH/SH/FH are unobtainable due to: NA    Context: Daja Hubbard is a 60 y.o. female who presents to the ED c/o onset 3 days ago of left lower quadrant and lower mid abdominal pain, similar to previous episodes of diverticulitis.  She describes the pain as \"hard cramps\", primarily originating left lower quadrant and radiating across the lower abdomen.  No melena or hematochezia.  She was admitted here in December for diverticulitis flare.  Last colonoscopy was approximately 1 year ago.  She is followed by Dr. Barney.      PAST MEDICAL HISTORY  Active Ambulatory Problems     Diagnosis Date Noted   • Sigmoid diverticulitis 2019   • Pancreatic insufficiency 2019   • Essential hypertension 2019   • Depression 2019   • Hypokalemia 2019   • AARTI (acute kidney injury) (CMS/HCC) 2019   • Atrophic vaginitis 2020   • Mild dysplasia of cervix (DEV I) 2020   • Urinary incontinence 2020     Resolved Ambulatory Problems     Diagnosis Date Noted   • No Resolved Ambulatory Problems     Past Medical History:   Diagnosis Date   • Cervical high risk human papillomavirus (HPV) DNA test positive 2015   • Cervical stricture or stenosis    • Diverticulitis    • Fibromyalgia    • Hypertension    • Low grade squamous intraepithelial lesion (LGSIL) on cervical Pap smear 2020   • Mild dysplasia of cervix    • Mild dysplasia of cervix 2020   • Recurrent UTI    • Tobacco dependence          PAST SURGICAL HISTORY  Past Surgical History:   Procedure Laterality Date   • BLADDER SURGERY     •  SECTION     • COLONOSCOPY     • COLPOSCOPY  2015   • HERNIA REPAIR     • KIDNEY STONE SURGERY     • LEEP  2020         FAMILY HISTORY  Family History "   Problem Relation Age of Onset   • Colon cancer Other    • Osteoporosis Other    • Alzheimer's disease Other          SOCIAL HISTORY  Social History     Socioeconomic History   • Marital status:      Spouse name: Not on file   • Number of children: Not on file   • Years of education: Not on file   • Highest education level: Not on file   Tobacco Use   • Smoking status: Former Smoker     Years: 20.00     Quit date: 2018     Years since quittin.8   • Smokeless tobacco: Never Used   Substance and Sexual Activity   • Alcohol use: Not Currently     Comment: Per [facility], occasional/no abuse   • Drug use: Not Currently   • Sexual activity: Yes     Comment: IUD check-Mirena IUD; due for removal 3/2013   Social History Narrative    , has a daughter, unemployed         ALLERGIES  Sulfa antibiotics        REVIEW OF SYSTEMS  Review of Systems     All systems reviewed and negative except for those discussed in HPI.       PHYSICAL EXAM    I have reviewed the triage vital signs and nursing notes.    ED Triage Vitals [10/06/20 0908]   Temp Heart Rate Resp BP SpO2   96.6 °F (35.9 °C) 79 18 137/83 96 %      Temp src Heart Rate Source Patient Position BP Location FiO2 (%)   Infrared Monitor Lying Right arm --       Physical Exam  GENERAL: Awake alert and oriented, afebrile hemodynamically stable, appears to be in significant amount of discomfort but not toxic appearing.  Well developed.  Appears in no acute distress.  HENT: Nares patent mucous membranes are moist airways patent uvula is midline.  EYES: No scleral icterus  CV: Regular rhythm, regular rate no murmurs rubs or gallops.  RESPIRATORY: Normal effort.  No audible wheezes, rales or rhonchi.  No tachypnea or respiratory distress or accessory muscle use.  ABDOMEN: She exhibits guarding with mild rebound tenderness, positive heeltap, bowel sounds are normal.  No palpable organomegaly or pulsatile masses.  No CVA tenderness.  MUSCULOSKELETAL: No  deformities.   NEURO: Alert, moves all extremities, follows commands  SKIN: Warm, dry, no rash visualized        LAB RESULTS  Recent Results (from the past 24 hour(s))   Comprehensive Metabolic Panel    Collection Time: 10/06/20  9:58 AM    Specimen: Blood   Result Value Ref Range    Glucose 126 (H) 65 - 99 mg/dL    BUN 17 6 - 20 mg/dL    Creatinine 1.15 (H) 0.57 - 1.00 mg/dL    Sodium 130 (L) 136 - 145 mmol/L    Potassium 3.9 3.5 - 5.2 mmol/L    Chloride 97 (L) 98 - 107 mmol/L    CO2 23.0 22.0 - 29.0 mmol/L    Calcium 8.9 8.6 - 10.5 mg/dL    Total Protein 6.9 6.0 - 8.5 g/dL    Albumin 3.90 3.50 - 5.20 g/dL    ALT (SGPT) 7 1 - 33 U/L    AST (SGOT) 11 1 - 32 U/L    Alkaline Phosphatase 93 39 - 117 U/L    Total Bilirubin 0.6 0.0 - 1.2 mg/dL    eGFR Non African Amer 48 (L) >60 mL/min/1.73    Globulin 3.0 gm/dL    A/G Ratio 1.3 g/dL    BUN/Creatinine Ratio 14.8 7.0 - 25.0    Anion Gap 10.0 5.0 - 15.0 mmol/L   Lipase    Collection Time: 10/06/20  9:58 AM    Specimen: Blood   Result Value Ref Range    Lipase 14 13 - 60 U/L   Urinalysis With Microscopic If Indicated (No Culture) - Urine, Clean Catch    Collection Time: 10/06/20  9:58 AM    Specimen: Urine, Clean Catch   Result Value Ref Range    Color, UA Dark Yellow (A) Yellow, Straw    Appearance, UA Cloudy (A) Clear    pH, UA 5.5 5.0 - 8.0    Specific Gravity, UA 1.045 (H) 1.001 - 1.030    Glucose, UA Negative Negative    Ketones, UA 15 mg/dL (1+) (A) Negative    Bilirubin, UA Moderate (2+) (A) Negative    Blood, UA Negative Negative    Protein,  mg/dL (2+) (A) Negative    Leuk Esterase, UA Small (1+) (A) Negative    Nitrite, UA Positive (A) Negative    Urobilinogen, UA 1.0 E.U./dL 0.2 - 1.0 E.U./dL   Light Blue Top    Collection Time: 10/06/20  9:58 AM   Result Value Ref Range    Extra Tube hold for add-on    Green Top (Gel)    Collection Time: 10/06/20  9:58 AM   Result Value Ref Range    Extra Tube Hold for add-ons.    Lavender Top    Collection Time: 10/06/20   9:58 AM   Result Value Ref Range    Extra Tube hold for add-on    Gold Top - SST    Collection Time: 10/06/20  9:58 AM   Result Value Ref Range    Extra Tube Hold for add-ons.    CBC Auto Differential    Collection Time: 10/06/20  9:58 AM    Specimen: Blood   Result Value Ref Range    WBC 17.71 (H) 3.40 - 10.80 10*3/mm3    RBC 3.98 3.77 - 5.28 10*6/mm3    Hemoglobin 11.9 (L) 12.0 - 15.9 g/dL    Hematocrit 36.5 34.0 - 46.6 %    MCV 91.7 79.0 - 97.0 fL    MCH 29.9 26.6 - 33.0 pg    MCHC 32.6 31.5 - 35.7 g/dL    RDW 12.8 12.3 - 15.4 %    RDW-SD 42.9 37.0 - 54.0 fl    MPV 11.6 6.0 - 12.0 fL    Platelets 235 140 - 450 10*3/mm3    Neutrophil % 83.9 (H) 42.7 - 76.0 %    Lymphocyte % 9.0 (L) 19.6 - 45.3 %    Monocyte % 6.3 5.0 - 12.0 %    Eosinophil % 0.2 (L) 0.3 - 6.2 %    Basophil % 0.2 0.0 - 1.5 %    Immature Grans % 0.4 0.0 - 0.5 %    Neutrophils, Absolute 14.87 (H) 1.70 - 7.00 10*3/mm3    Lymphocytes, Absolute 1.59 0.70 - 3.10 10*3/mm3    Monocytes, Absolute 1.11 (H) 0.10 - 0.90 10*3/mm3    Eosinophils, Absolute 0.03 0.00 - 0.40 10*3/mm3    Basophils, Absolute 0.04 0.00 - 0.20 10*3/mm3    Immature Grans, Absolute 0.07 (H) 0.00 - 0.05 10*3/mm3    nRBC 0.0 0.0 - 0.2 /100 WBC   Urinalysis, Microscopic Only - Urine, Clean Catch    Collection Time: 10/06/20  9:58 AM    Specimen: Urine, Clean Catch   Result Value Ref Range    RBC, UA 13-20 (A) None Seen, 0-2 /HPF    WBC, UA 31-50 (A) None Seen, 0-2 /HPF    Bacteria, UA None Seen None Seen, Trace /HPF    Squamous Epithelial Cells, UA 7-12 (A) None Seen, 0-2 /HPF    Hyaline Casts, UA None Seen 0 - 6 /LPF    Calcium Oxalate Crystals, UA Small/1+ None Seen /HPF    Mucus, UA Small/1+ (A) None Seen, Trace /HPF    Methodology Manual Light Microscopy    Procalcitonin    Collection Time: 10/06/20  9:58 AM    Specimen: Blood   Result Value Ref Range    Procalcitonin 0.81 (H) 0.00 - 0.25 ng/mL   Lactic Acid, Plasma    Collection Time: 10/06/20  9:58 AM    Specimen: Blood   Result Value  Ref Range    Lactate 1.1 0.5 - 2.0 mmol/L       Ordered the above labs and independently reviewed the results.        RADIOLOGY  Ct Abdomen Pelvis With Contrast    Result Date: 10/6/2020  EXAMINATION: CT ABDOMEN PELVIS W CONTRAST-  INDICATION: LLQ pain, peritoneal signs, hx diverticulitis  TECHNIQUE: Axial IV contrast-enhanced CT of the abdomen and pelvis with multiplanar reconstructions.  The radiation dose reduction device was turned on for each scan per the ALARA (As Low as Reasonably Achievable) protocol.  COMPARISON: 1/16/2020  FINDINGS: The lung bases are clear. No aggressive soft tissue body wall findings. Sacral nerve stimulator noted. No acute fracture or aggressive osseous lesion. The liver, spleen, pancreas and bilateral adrenal glands demonstrate homogeneous enhancement without evidence of focal lesion. The kidneys are unremarkable. No retroperitoneal adenopathy. Atherosclerotic abdominal aorta. Small and large bowel loops are nondilated. Similar in appearance and severity to multiple prior studies, there is mucosal edema and adjacent fat stranding of the mid sigmoid: With minimal associated diverticular change. No free air or loculated free fluid concerning for abscess. The pelvic viscera are unremarkable.      Similar appearing short segment marked inflammatory change of the midsigmoid colon with associated mucosal edema and wall thickening. No evidence of perforation or abscess. While finding remains somewhat compatible with acute diverticulitis, this appearance is similar to multiple prior studies and there is again minimal associated diverticulosis, somewhat discordant to the degree of inflammatory change. Consider additional causes of colitis. Also consider nonemergent colonoscopy when acute inflammatory process has subsided to exclude underlying colon mass.   This report was finalized on 10/6/2020 12:30 PM by Savage Horvath.              PROCEDURES    Procedures      MEDICATIONS GIVEN IN  ER    Medications   sodium chloride 0.9 % flush 10 mL (has no administration in time range)   piperacillin-tazobactam (ZOSYN) 3.375 g in iso-osmotic dextrose 50 ml (premix) (has no administration in time range)   sodium chloride 0.9 % bolus 1,000 mL (0 mL Intravenous Stopped 10/6/20 1119)   ondansetron (ZOFRAN) injection 4 mg (4 mg Intravenous Given 10/6/20 1020)   HYDROmorphone (DILAUDID) injection 0.5 mg (0.5 mg Intravenous Given 10/6/20 1428)   piperacillin-tazobactam (ZOSYN) 3.375 g in iso-osmotic dextrose 50 ml (premix) (0 g Intravenous Stopped 10/6/20 1150)   iopamidol (ISOVUE-300) 61 % injection 100 mL (95 mL Intravenous Given 10/6/20 1210)         PROGRESS, DATA ANALYSIS, CONSULTS, AND MEDICAL DECISION MAKING    All labs have been independently reviewed by me.  All radiology studies have been reviewed by me and the radiologist dictating the report.   EKG's have been independently viewed and interpreted by me.            ED Course as of Oct 06 1431   Tue Oct 06, 2020   1218 Nitrite, UA(!): Positive [TG]   1218 WBC, UA(!): 31-50 [TG]   1218 RBC, UA(!): 13-20 [TG]      ED Course User Index  [TG] Sanford Palmer PA-C       Patient remained stable throughout course of stay in emergency department.  She was treated initially here in the ER with Zosyn, 1000 mL saline, Dilaudid and Zofran for pain and nausea.  CT is remarkable for findings consistent with diverticulitis however similar in appearance to previous CT, nonemergent colonoscopy recommended to rule out mass.  She does have a UTI as well and a white blood cell count of 17.71 and her lactic acid is 1.1, and procalcitonin 0.81.  Discussed with Dr. Recinos, patient will be admitted to hospitalist service for further evaluation and treatment.      AS OF 14:31 EDT VITALS:    BP - 119/71  HR - 79  TEMP - 96.6 °F (35.9 °C) (Infrared)  O2 SATS - 96%        DIAGNOSIS  Final diagnoses:   Sepsis without acute organ dysfunction, due to unspecified organism  (CMS/[facility])   Acute diverticulitis   Urinary tract infection without hematuria, site unspecified   Acute kidney injury (CMS/[facility])         DISPOSITION  Admit to hospitalist service             Sanford Palmer PA-C  10/06/20 0697

## 2020-10-06 NOTE — H&P
[facility] Medicine Services  HISTORY AND PHYSICAL    Patient Name: Daja Hubbard  : 1960  MRN: [id number]  Primary Care Physician: Sp Marie MD  Date of admission: 10/6/2020      Subjective   Subjective     Chief Complaint:  LLQ pain    HPI:  Daja Hubbard is a 60 y.o. female with prior history of diverticulitis, depression, hypertension who presents to the emergency room with complaint of persistent left lower quadrant abdominal pain.  Symptoms began approximately 2 to 3 days ago and has continued to persist.  Associated with nausea and vomiting.  She is becoming more constipated, but no blood in her bowel movements.  Previously admitted in 2019 with sigmoid diverticulitis and was treated with Invanz as an outpatient at that time by Dr. Arias.  Follows with Dr. Barney with colorectal surgery.  She tells me her last colonoscopy was about a year ago, but cannot tell me if it was before her or after the episode of diverticulitis.  She also tells me that they had trouble passing the scope at some point, however these details are unclear.  Work-up in the emergency room includes a CT scan which again demonstrates sigmoid diverticulitis very similar appearance to prior CT scans.  White blood cell count is elevated at 17,000.  She is feeling a little bit better after some IV pain medication but is still in quite a bit of pain and asking for more.  She has been given a dose of Zosyn.  She will be admitted for further evaluation.    Review of Systems   Gen- No fevers, chills  CV- No chest pain, palpitations  Resp- No cough, dyspnea  GI- as above    All other systems reviewed and are negative.     Personal History     Past Medical History:   Diagnosis Date   • Cervical high risk human papillomavirus (HPV) DNA test positive 2015    NON 16/18 POSITIVE   • Cervical stricture or stenosis     cervical stricture and stenosis   • Depression    • Diverticulitis    •  Fibromyalgia    • Hypertension    • Low grade squamous intraepithelial lesion (LGSIL) on cervical Pap smear 2020    colposcopy 2020 with BX at 6 and ECC; unsatisfactory; thick WE extending inside OS   • Mild dysplasia of cervix     (DEV I)   • Mild dysplasia of cervix 2020    ECC positive LGSIL; colposcopy unsatisfactory with WE extending inside os.   • Mild dysplasia of cervix (DEV I) 2020   • Recurrent UTI    • Tobacco dependence        Past Surgical History:   Procedure Laterality Date   • BLADDER SURGERY     •  SECTION     • COLONOSCOPY     • COLPOSCOPY  2015   • HERNIA REPAIR     • KIDNEY STONE SURGERY     • LEEP  2020       Family History: family history includes Alzheimer's disease in an other family member; Colon cancer in an other family member; Osteoporosis in an other family member.No histoy of colon cancer. Otherwise pertinent FHx was reviewed and unremarkable.     Social History:  reports that she quit smoking about 21 months ago. She quit after 20.00 years of use. She has never used smokeless tobacco. She reports previous alcohol use. She reports previous drug use.  Social History     Social History Narrative    , has a daughter, unemployed       Medications:  Available home medication information reviewed.    Allergies   Allergen Reactions   • Sulfa Antibiotics Hives       Objective   Objective     Vital Signs:   Temp:  [96.6 °F (35.9 °C)] 96.6 °F (35.9 °C)  Heart Rate:  [79] 79  Resp:  [18] 18  BP: (119-137)/(71-83) 119/71        Physical Exam   Constitutional: Awake, alert, appears uncomfortable  Eyes: PERRLA, sclerae anicteric, no conjunctival injection  HENT: NCAT, mucous membranes moist  Neck: Supple, no thyromegaly, no lymphadenopathy, trachea midline  Respiratory: Clear to auscultation bilaterally, nonlabored respirations   Cardiovascular: RRR, no murmurs, rubs, or gallops   Gastrointestinal: Positive bowel sounds, soft, some exquisite tenderness  to LLQ with some guarding.  Musculoskeletal: No bilateral ankle edema, no clubbing or cyanosis to extremities  Psychiatric: Appropriate affect, cooperative  Neurologic: Oriented x 3, no focal deficits  Skin: No rashes      Results Reviewed:  I have personally reviewed current lab and radiology data.    Results from last 7 days   Lab Units 10/06/20  0958   WBC 10*3/mm3 17.71*   HEMOGLOBIN g/dL 11.9*   HEMATOCRIT % 36.5   PLATELETS 10*3/mm3 235     Results from last 7 days   Lab Units 10/06/20  0958   SODIUM mmol/L 130*   POTASSIUM mmol/L 3.9   CHLORIDE mmol/L 97*   CO2 mmol/L 23.0   BUN mg/dL 17   CREATININE mg/dL 1.15*   GLUCOSE mg/dL 126*   CALCIUM mg/dL 8.9   ALT (SGPT) U/L 7   AST (SGOT) U/L 11   LACTATE mmol/L 1.1   PROCALCITONIN ng/mL 0.81*     Estimated Creatinine Clearance: 53 mL/min (A) (by C-G formula based on SCr of 1.15 mg/dL (H)).  Brief Urine Lab Results  (Last result in the past 365 days)      Color   Clarity   Blood   Leuk Est   Nitrite   Protein   CREAT   Urine HCG        10/06/20 0958 Dark Yellow Cloudy Negative Small (1+) Positive 100 mg/dL (2+)             Imaging Results (Last 24 Hours)     Procedure Component Value Units Date/Time    CT Abdomen Pelvis With Contrast [[id number]] Collected: 10/06/20 1225     Updated: 10/06/20 1233    Narrative:      EXAMINATION: CT ABDOMEN PELVIS W CONTRAST-      INDICATION: LLQ pain, peritoneal signs, hx diverticulitis     TECHNIQUE: Axial IV contrast-enhanced CT of the abdomen and pelvis with  multiplanar reconstructions.     The radiation dose reduction device was turned on for each scan per the  ALARA (As Low as Reasonably Achievable) protocol.     COMPARISON: 1/16/2020     FINDINGS: The lung bases are clear. No aggressive soft tissue body wall  findings. Sacral nerve stimulator noted. No acute fracture or aggressive  osseous lesion. The liver, spleen, pancreas and bilateral adrenal glands  demonstrate homogeneous enhancement without evidence of focal  lesion.  The kidneys are unremarkable. No retroperitoneal adenopathy.  Atherosclerotic abdominal aorta. Small and large bowel loops are  nondilated. Similar in appearance and severity to multiple prior  studies, there is mucosal edema and adjacent fat stranding of the mid  sigmoid: With minimal associated diverticular change. No free air or  loculated free fluid concerning for abscess. The pelvic viscera are  unremarkable.       Impression:      Similar appearing short segment marked inflammatory change  of the midsigmoid colon with associated mucosal edema and wall  thickening. No evidence of perforation or abscess. While finding remains  somewhat compatible with acute diverticulitis, this appearance is  similar to multiple prior studies and there is again minimal associated  diverticulosis, somewhat discordant to the degree of inflammatory  change. Consider additional causes of colitis. Also consider nonemergent  colonoscopy when acute inflammatory process has subsided to exclude  underlying colon mass.        This report was finalized on 10/6/2020 12:30 PM by Savage Horvath.                Assessment/Plan   Assessment & Plan     Active Hospital Problems    Diagnosis POA   • **SIRS (systemic inflammatory response syndrome) (CMS/HCC) [R65.10] Yes     Priority: Medium   • Hyponatremia [E87.1] Yes   • Sigmoid diverticulitis [K57.32] Yes   • Essential hypertension [I10] Yes       Patient is a 60-year-old female  who presents with episodes of recurrent diverticulitis and severe left lower quadrant abdominal pain.  Meet SIRS criteria.  Imaging shows persistent inflammation in the sigmoid colon consistent with prior imaging.  Follows with colorectal surgery but is unclear when her last colonoscopy was.  Due to recurrent episodes I will ask Dr. Grande to see while she is here.  May need to consider outpatient elective colectomy at some point.  No evidence of perforation or abscess however given recurrent nature will  likely need IV antibiotics at discharge and will consult Dr. Arias to assist with that again.  Continue Zosyn for now.  We will give her IV fluids and pain and nausea control.  Recheck blood work in the morning I suspect sodium will correct itself.  Continue her home blood pressure medications.  For now placed on clear liquid diet and advance as tolerated.  Plan of care discussed with patient and  and all questions were answered.    DVT prophylaxis:  lovenox      CODE STATUS:    Code Status and Medical Interventions:   Ordered at: 10/06/20 1421     Level Of Support Discussed With:    Patient     Code Status:    CPR     Medical Interventions (Level of Support Prior to Arrest):    Full       Admission Status:  I believe this patient meets INPATIENT status due to need for IV antibiotics, and IV pain medication in the setting of recurrent diverticulitis..  I feel patient’s risk for adverse outcomes and need for care warrant INPATIENT evaluation and I predict the patient’s care encounter to likely last beyond 2 midnights.      Peng Sebastian MD  10/06/20

## 2020-10-06 NOTE — PROGRESS NOTES
Discharge Planning Assessment  [facility]     Patient Name: Daja Hubbard  MRN: [id number]  Today's Date: 10/6/2020    Admit Date: 10/6/2020    Discharge Needs Assessment     Row Name 10/06/20 1926       Living Environment    Lives With  spouse    Name(s) of Who Lives With Patient  Spouse - Shashi - [phone]    Current Living Arrangements  home/apartment/condo    Primary Care Provided by  self    Provides Primary Care For  no one    Family Caregiver if Needed  spouse    Family Caregiver Names  Spouse - Shashi - [phone]    Quality of Family Relationships  helpful;involved;supportive    Able to Return to Prior Arrangements  yes       Resource/Environmental Concerns    Resource/Environmental Concerns  none    Transportation Concerns  car, none       Transition Planning    Patient/Family Anticipates Transition to  home with family    Patient/Family Anticipated Services at Transition  none    Transportation Anticipated  family or friend will provide       Discharge Needs Assessment    Readmission Within the Last 30 Days  no previous admission in last 30 days    Equipment Currently Used at Home  none    Concerns to be Addressed  denies needs/concerns at this time    Anticipated Changes Related to Illness  none    Equipment Needed After Discharge  none        Discharge Plan     Row Name 10/06/20 1927       Plan    Plan  Initial    Plan Comments  CM spoke with patient at bedside. Patient resides in [facility] with her spouse. Patient is independent with ADL's. Patient denies any DME in the home. Patient denies any current HH or OP services. Patient denies any discharge planning needs at this time. LUIS ALFREDO following,    Final Discharge Disposition Code  30 - still a patient        Continued Care and Services - Admitted Since 10/6/2020    Coordination has not been started for this encounter.         Demographic Summary     Row Name 10/06/20 1914       Contact Information    Contact Information Comments  Spouse - Shashi -  [phone]    Row Name 10/06/20 1852       General Information    Admission Type  inpatient    Arrived From  home    Referral Source  emergency department    Reason for Consult  discharge planning    Preferred Language  English     Used During This Interaction  no    General Information Comments  PCP: Sp Marie        Functional Status     Row Name 10/06/20 1925       Functional Status    Usual Activity Tolerance  good    Current Activity Tolerance  moderate       Functional Status, IADL    Medications  independent    Meal Preparation  independent    Housekeeping  independent    Laundry  independent    Shopping  independent       Mental Status    General Appearance WDL  WDL       Mental Status Summary    Recent Changes in Mental Status/Cognitive Functioning  no changes       Employment/    Employment Status  retired          Candi Walden RN

## 2020-10-06 NOTE — CONSULTS
Chief Complaint:  LLQ pain     HPI:  Daja Hubbard is a 60 y.o. female with prior history of diverticulitis, depression, hypertension who presents to the emergency room with complaint of persistent left lower quadrant abdominal pain.      Symptoms began approximately 2 to 3 days ago and has continued to persist.    Early manifestations on Saturday night, today is Tuesday  Onset associated with abdominal pain nausea and vomiting .      She is becoming more constipated, but no blood in her bowel movements.  Previously admitted in December 2019 with sigmoid diverticulitis and was treated with Invanz as an outpatient at that time by Dr. Arias.      Follows with Dr. Barney with colorectal surgery.  She tells me her last colonoscopy was about a year ago, but cannot tell me if it was before her or after the episode of diverticulitis.      I will have to check her endoscopy records in the office    She also tells me that they had trouble passing the scope at some point, however these details are unclear.    Work-up in the emergency room includes a CT scan which again demonstrates sigmoid diverticulitis very similar appearance to prior CT scans.  White blood cell count is elevated at 17,000.  She is feeling a little bit better after some IV pain medication but is still in quite a bit of pain and asking for more.  She has been given a dose of Zosyn.  She will be admitted for further evaluation.     Review of Systems   Gen- No fevers, chills  CV- No chest pain, palpitations  Resp- No cough, dyspnea  GI- as above     All other systems reviewed and are negative.      Personal History      Medical History        Past Medical History:   Diagnosis Date   • Cervical high risk human papillomavirus (HPV) DNA test positive 09/23/2015     NON 16/18 POSITIVE   • Cervical stricture or stenosis       cervical stricture and stenosis   • Depression     • Diverticulitis     • Fibromyalgia     • Hypertension     • Low grade squamous  intraepithelial lesion (LGSIL) on cervical Pap smear 2020     colposcopy 2020 with BX at 6 and ECC; unsatisfactory; thick WE extending inside OS   • Mild dysplasia of cervix       (DEV I)   • Mild dysplasia of cervix 2020     ECC positive LGSIL; colposcopy unsatisfactory with WE extending inside os.   • Mild dysplasia of cervix (DEV I) 2020   • Recurrent UTI     • Tobacco dependence             Surgical History         Past Surgical History:   Procedure Laterality Date   • BLADDER SURGERY       •  SECTION       • COLONOSCOPY       • COLPOSCOPY   2015   • HERNIA REPAIR       • KIDNEY STONE SURGERY       • LEEP   2020           Family History: family history includes Alzheimer's disease in an other family member; Colon cancer in an other family member; Osteoporosis in an other family member.No histoy of colon cancer. Otherwise pertinent FHx was reviewed and unremarkable.      Social History:  reports that she quit smoking about 21 months ago. She quit after 20.00 years of use. She has never used smokeless tobacco. She reports previous alcohol use. She reports previous drug use.  Social History          Social History Narrative     , has a daughter, unemployed         Medications:  Available home medication information reviewed.     Allergies   Allergen Reactions   • Sulfa Antibiotics Hives            Objective      Objective      Vital Signs:   Temp:  [96.6 °F (35.9 °C)] 96.6 °F (35.9 °C)  Heart Rate:  [79] 79  Resp:  [18] 18  BP: (119-137)/(71-83) 119/71      Physical Exam   Constitutional: Awake, alert, appears uncomfortable  Eyes: PERRLA, sclerae anicteric, no conjunctival injection  HENT: NCAT, mucous membranes moist  Neck: Supple, no thyromegaly, no lymphadenopathy, trachea midline  Respiratory: Clear to auscultation bilaterally, nonlabored respirations   Cardiovascular: RRR, no murmurs, rubs, or gallops   Gastrointestinal: Positive bowel sounds, soft, some exquisite  tenderness to LLQ and suprapubic region with guarding.  Rectal exam without palpable pelvic abscess  Sacral nerve stimulator device for urologic incontinence in place.  Musculoskeletal: No bilateral ankle edema, no clubbing or cyanosis to extremities  Psychiatric: Appropriate affect, cooperative  Neurologic: Oriented x 3, no focal deficits  Skin: No rashes        Results Reviewed:  I have personally reviewed current lab and radiology data.          Results from last 7 days   Lab Units 10/06/20  0958   WBC 10*3/mm3 17.71*   HEMOGLOBIN g/dL 11.9*   HEMATOCRIT % 36.5   PLATELETS 10*3/mm3 235           Results from last 7 days   Lab Units 10/06/20  0958   SODIUM mmol/L 130*   POTASSIUM mmol/L 3.9   CHLORIDE mmol/L 97*   CO2 mmol/L 23.0   BUN mg/dL 17   CREATININE mg/dL 1.15*   GLUCOSE mg/dL 126*   CALCIUM mg/dL 8.9   ALT (SGPT) U/L 7   AST (SGOT) U/L 11   LACTATE mmol/L 1.1   PROCALCITONIN ng/mL 0.81*      Estimated Creatinine Clearance: 53 mL/min (A) (by C-G formula based on SCr of 1.15 mg/dL (H)).                        Brief Urine Lab Results  (Last result in the past 365 days)       Color   Clarity   Blood   Leuk Est   Nitrite   Protein   CREAT   Urine HCG         10/06/20 0958 Dark Yellow Cloudy Negative Small (1+) Positive 100 mg/dL (2+)                             Imaging Results (Last 24 Hours)      Procedure Component Value Units Date/Time     CT Abdomen Pelvis With Contrast [[id number]] Collected: 10/06/20 1225       Updated: 10/06/20 1233     Narrative:       EXAMINATION: CT ABDOMEN PELVIS W CONTRAST-      INDICATION: LLQ pain, peritoneal signs, hx diverticulitis     TECHNIQUE: Axial IV contrast-enhanced CT of the abdomen and pelvis with  multiplanar reconstructions.     The radiation dose reduction device was turned on for each scan per the  ALARA (As Low as Reasonably Achievable) protocol.     COMPARISON: 1/16/2020     FINDINGS: The lung bases are clear. No aggressive soft tissue body wall  findings. Sacral  nerve stimulator noted. No acute fracture or aggressive  osseous lesion. The liver, spleen, pancreas and bilateral adrenal glands  demonstrate homogeneous enhancement without evidence of focal lesion.  The kidneys are unremarkable. No retroperitoneal adenopathy.  Atherosclerotic abdominal aorta. Small and large bowel loops are  nondilated. Similar in appearance and severity to multiple prior  studies, there is mucosal edema and adjacent fat stranding of the mid  sigmoid: With minimal associated diverticular change. No free air or  loculated free fluid concerning for abscess. The pelvic viscera are  unremarkable.        Impression:       Similar appearing short segment marked inflammatory change  of the midsigmoid colon with associated mucosal edema and wall  thickening. No evidence of perforation or abscess. While finding remains  somewhat compatible with acute diverticulitis, this appearance is  similar to multiple prior studies and there is again minimal associated  diverticulosis, somewhat discordant to the degree of inflammatory  change. Consider additional causes of colitis. Also consider nonemergent  colonoscopy when acute inflammatory process has subsided to exclude  underlying colon mass.        This report was finalized on 10/6/2020 12:30 PM by Savage Horvath.                   Assessment/Plan      Assessment & Plan            Active Hospital Problems     Diagnosis POA   • **SIRS (systemic inflammatory response syndrome) (CMS/HCC) [R65.10] Yes       Priority: Medium   • Hyponatremia [E87.1] Yes   • Sigmoid diverticulitis [K57.32] Yes   • Essential hypertension [I10] Yes         Patient is a 60-year-old female  who presents with episodes of recurrent diverticulitis and severe left lower quadrant abdominal pain.  Meet SIRS criteria.      maging shows persistent inflammation in the sigmoid colon consistent with prior imaging.  Follows with colorectal surgery but is unclear when her last colonoscopy was.         May need to consider outpatient elective colectomy at some point.  No evidence of perforation or abscess however given recurrent nature will likely need IV antibiotics at discharge and will consult Dr. Arias to assist with that again.      Continue Zosyn for now.       DVT prophylaxis:  lovenox    Recurrent mid sigmoid diverticulitis, very symptomatic, without evidence of abscess.    Recurring in the same area    Recurrent hospitalization, last hospitalized in December    Patient's name seems very familiar to me, will review previous hospitalization notes, previous colonoscopy notes, previous office notes and discussions regarding medical versus surgical management.    With recurrent difficulties in the same area and fairly severe symptoms, likely to consider elective sigmoid resection at an interval.    If the situation deteriorates on this hospitalization, then would consider surgery on this admission.

## 2020-10-07 LAB
BACTERIA SPEC AEROBE CULT: NO GROWTH
DEPRECATED RDW RBC AUTO: 44 FL (ref 37–54)
ERYTHROCYTE [DISTWIDTH] IN BLOOD BY AUTOMATED COUNT: 12.8 % (ref 12.3–15.4)
HCT VFR BLD AUTO: 33.6 % (ref 34–46.6)
HGB BLD-MCNC: 10.8 G/DL (ref 12–15.9)
MCH RBC QN AUTO: 30 PG (ref 26.6–33)
MCHC RBC AUTO-ENTMCNC: 32.1 G/DL (ref 31.5–35.7)
MCV RBC AUTO: 93.3 FL (ref 79–97)
PLATELET # BLD AUTO: 225 10*3/MM3 (ref 140–450)
PMV BLD AUTO: 11.3 FL (ref 6–12)
RBC # BLD AUTO: 3.6 10*6/MM3 (ref 3.77–5.28)
SARS-COV-2 RNA RESP QL NAA+PROBE: NOT DETECTED
WBC # BLD AUTO: 16.83 10*3/MM3 (ref 3.4–10.8)

## 2020-10-07 PROCEDURE — 99232 SBSQ HOSP IP/OBS MODERATE 35: CPT | Performed by: INTERNAL MEDICINE

## 2020-10-07 PROCEDURE — 85027 COMPLETE CBC AUTOMATED: CPT | Performed by: INTERNAL MEDICINE

## 2020-10-07 PROCEDURE — 25010000002 ENOXAPARIN PER 10 MG: Performed by: INTERNAL MEDICINE

## 2020-10-07 PROCEDURE — 25010000002 PIPERACILLIN SOD-TAZOBACTAM PER 1 G: Performed by: INTERNAL MEDICINE

## 2020-10-07 RX ORDER — SIMETHICONE 80 MG
80 TABLET,CHEWABLE ORAL 4 TIMES DAILY PRN
Status: DISCONTINUED | OUTPATIENT
Start: 2020-10-07 | End: 2020-10-12 | Stop reason: HOSPADM

## 2020-10-07 RX ORDER — SODIUM CHLORIDE 9 MG/ML
100 INJECTION, SOLUTION INTRAVENOUS CONTINUOUS
Status: ACTIVE | OUTPATIENT
Start: 2020-10-07 | End: 2020-10-08

## 2020-10-07 RX ORDER — LOSARTAN POTASSIUM 25 MG/1
50 TABLET ORAL DAILY
COMMUNITY
End: 2022-11-02

## 2020-10-07 RX ADMIN — SODIUM CHLORIDE, PRESERVATIVE FREE 10 ML: 5 INJECTION INTRAVENOUS at 20:00

## 2020-10-07 RX ADMIN — TAZOBACTAM SODIUM AND PIPERACILLIN SODIUM 3.38 G: 375; 3 INJECTION, SOLUTION INTRAVENOUS at 02:13

## 2020-10-07 RX ADMIN — TAZOBACTAM SODIUM AND PIPERACILLIN SODIUM 3.38 G: 375; 3 INJECTION, SOLUTION INTRAVENOUS at 17:15

## 2020-10-07 RX ADMIN — SIMETHICONE 80 MG: 80 TABLET, CHEWABLE ORAL at 09:46

## 2020-10-07 RX ADMIN — CITALOPRAM 40 MG: 40 TABLET, FILM COATED ORAL at 08:09

## 2020-10-07 RX ADMIN — ENOXAPARIN SODIUM 40 MG: 40 INJECTION SUBCUTANEOUS at 20:00

## 2020-10-07 RX ADMIN — OXYCODONE HYDROCHLORIDE AND ACETAMINOPHEN 1 TABLET: 7.5; 325 TABLET ORAL at 14:36

## 2020-10-07 RX ADMIN — SIMETHICONE 80 MG: 80 TABLET, CHEWABLE ORAL at 20:06

## 2020-10-07 RX ADMIN — TAZOBACTAM SODIUM AND PIPERACILLIN SODIUM 3.38 G: 375; 3 INJECTION, SOLUTION INTRAVENOUS at 11:16

## 2020-10-07 RX ADMIN — SODIUM CHLORIDE 100 ML/HR: 9 INJECTION, SOLUTION INTRAVENOUS at 04:07

## 2020-10-07 RX ADMIN — SODIUM CHLORIDE 100 ML/HR: 9 INJECTION, SOLUTION INTRAVENOUS at 17:15

## 2020-10-07 NOTE — PLAN OF CARE
Problem: Adult Inpatient Plan of Care  Goal: Plan of Care Review  Outcome: Ongoing, Progressing  Flowsheets (Taken 10/7/2020 0544)  Outcome Summary: Pt c/o pain during the night, PRN pain medications given with relief. VS stable on RA. NSR on telemetry. Will continue to monitor.  Goal: Patient-Specific Goal (Individualized)  Outcome: Ongoing, Progressing  Goal: Absence of Hospital-Acquired Illness or Injury  Outcome: Ongoing, Progressing  Intervention: Identify and Manage Fall Risk  Recent Flowsheet Documentation  Taken 10/7/2020 0400 by Aliza Mnedoza RN  Safety Promotion/Fall Prevention:   assistive device/personal items within reach   clutter free environment maintained   nonskid shoes/slippers when out of bed   room organization consistent   safety round/check completed  Taken 10/7/2020 0200 by Aliza Mendoza RN  Safety Promotion/Fall Prevention:   assistive device/personal items within reach   clutter free environment maintained   nonskid shoes/slippers when out of bed   room organization consistent   safety round/check completed  Taken 10/7/2020 0000 by Aliza Mendoza RN  Safety Promotion/Fall Prevention:   assistive device/personal items within reach   clutter free environment maintained   nonskid shoes/slippers when out of bed   room organization consistent   safety round/check completed  Taken 10/6/2020 2205 by Aliza Mendoza RN  Safety Promotion/Fall Prevention:   clutter free environment maintained   assistive device/personal items within reach   nonskid shoes/slippers when out of bed   room organization consistent   safety round/check completed  Taken 10/6/2020 2045 by Aliza Mendoza RN  Safety Promotion/Fall Prevention:   assistive device/personal items within reach   clutter free environment maintained   nonskid shoes/slippers when out of bed   room organization consistent   safety round/check completed  Intervention: Prevent Skin Injury  Recent Flowsheet Documentation  Taken 10/7/2020  0400 by Aliza Mendoza RN  Body Position: position changed independently  Taken 10/7/2020 0200 by Aliza Mendoza RN  Body Position: position changed independently  Taken 10/7/2020 0000 by Aliza Mendoza RN  Body Position: position changed independently  Taken 10/6/2020 2205 by Aliza Mendoza RN  Body Position: position changed independently  Taken 10/6/2020 2045 by Aliza Mendoza RN  Body Position: position changed independently  Intervention: Prevent and Manage VTE (venous thromboembolism) Risk  Recent Flowsheet Documentation  Taken 10/6/2020 2045 by Aliza Mendoza RN  VTE Prevention/Management: (SubQ lovenox) other (see comments)  Intervention: Prevent Infection  Recent Flowsheet Documentation  Taken 10/6/2020 2045 by Aliza Mendoza RN  Infection Prevention: single patient room provided  Goal: Optimal Comfort and Wellbeing  Outcome: Ongoing, Progressing  Intervention: Provide Person-Centered Care  Recent Flowsheet Documentation  Taken 10/6/2020 2045 by Aliza Mendoza RN  Trust Relationship/Rapport:   care explained   thoughts/feelings acknowledged   questions encouraged   questions answered  Goal: Readiness for Transition of Care  Outcome: Ongoing, Progressing     Problem: Adjustment to Illness (Sepsis/Septic Shock)  Goal: Optimal Coping  Outcome: Ongoing, Progressing  Intervention: Optimize Psychosocial Adjustment to Illness  Recent Flowsheet Documentation  Taken 10/6/2020 2045 by Aliza Mendoza RN  Supportive Measures:   active listening utilized   verbalization of feelings encouraged  Family/Support System Care: support provided     Problem: Bleeding (Sepsis/Septic Shock)  Goal: Absence of Bleeding  Outcome: Ongoing, Progressing     Problem: Glycemic Control Impaired (Sepsis/Septic Shock)  Goal: Blood Glucose Level Within Desired Range  Outcome: Ongoing, Progressing     Problem: Hemodynamic Instability (Sepsis/Septic Shock)  Goal: Effective Tissue Perfusion  Outcome: Ongoing,  Progressing     Problem: Infection (Sepsis/Septic Shock)  Goal: Absence of Infection Signs and Symptoms  Outcome: Ongoing, Progressing  Intervention: Prevent or Manage Infection Progression  Recent Flowsheet Documentation  Taken 10/6/2020 2045 by Aliza Mendoza RN  Infection Prevention: single patient room provided     Problem: Nutrition Impaired (Sepsis/Septic Shock)  Goal: Optimal Nutrition Intake  Outcome: Ongoing, Progressing     Problem: Respiratory Compromise (Sepsis/Septic Shock)  Goal: Effective Oxygenation and Ventilation  Outcome: Ongoing, Progressing  Intervention: Promote Airway Secretion Clearance  Recent Flowsheet Documentation  Taken 10/7/2020 0400 by Aliza Mendoza RN  Activity Management: activity adjusted per tolerance  Taken 10/7/2020 0200 by Aliza Mendoza RN  Activity Management: activity adjusted per tolerance  Taken 10/7/2020 0000 by Aliza Mendoza RN  Activity Management: activity adjusted per tolerance  Taken 10/6/2020 2205 by Aliza Mendoaz RN  Activity Management: activity adjusted per tolerance  Taken 10/6/2020 2045 by Aliza Mendoza RN  Activity Management: activity adjusted per tolerance  Intervention: Optimize Oxygenation and Ventilation  Recent Flowsheet Documentation  Taken 10/7/2020 0400 by Aliza Mendoza RN  Head of Bed (HOB): HOB elevated  Taken 10/7/2020 0200 by Aliza Mendoza RN  Head of Bed (HOB): HOB elevated  Taken 10/7/2020 0000 by Aliza Mendoza RN  Head of Bed (HOB): HOB elevated  Taken 10/6/2020 2205 by Aliza Mendoza RN  Head of Bed (HOB): HOB elevated  Taken 10/6/2020 2045 by Aliza Mendoza RN  Head of Bed (HOB): HOB elevated     Problem: UTI (Urinary Tract Infection)  Goal: Improved Infection Symptoms  Outcome: Ongoing, Progressing   Goal Outcome Evaluation:        Outcome Summary: Pt c/o pain during the night, PRN pain medications given with relief. VS stable on RA. NSR on telemetry. Will continue to monitor.

## 2020-10-07 NOTE — PROGRESS NOTES
"[facility] ([facility]      Intermittent severe pain continues      Vital Signs:  Blood pressure 122/76, pulse 73, temperature 98.6 °F (37 °C), temperature source Oral, resp. rate 17, height 162.6 cm (64\"), weight 83.2 kg (183 lb 6.4 oz), SpO2 95 %.    Labs past 24 hours:  Lab Results (last 24 hours)     Procedure Component Value Units Date/Time    Blood Culture - Blood, Arm, Left [[id number]] Collected: 10/06/20 1113    Specimen: Blood from Arm, Left Updated: 10/07/20 1130     Blood Culture No growth at 24 hours    Blood Culture - Blood, Arm, Left [[id number]] Collected: 10/06/20 1113    Specimen: Blood from Arm, Left Updated: 10/07/20 1130     Blood Culture No growth at 24 hours    Urine Culture - Urine, Urine, Clean Catch [[id number]]  (Normal) Collected: 10/06/20 0958    Specimen: Urine, Clean Catch Updated: 10/07/20 1051     Urine Culture No growth    CBC (No Diff) [[id number]]  (Abnormal) Collected: 10/07/20 0144    Specimen: Blood Updated: 10/07/20 0155     WBC 16.83 10*3/mm3      RBC 3.60 10*6/mm3      Hemoglobin 10.8 g/dL      Hematocrit 33.6 %      MCV 93.3 fL      MCH 30.0 pg      MCHC 32.1 g/dL      RDW 12.8 %      RDW-SD 44.0 fl      MPV 11.3 fL      Platelets 225 10*3/mm3           I/O last shift:  I/O this shift:  In: -   Out: 900 [Urine:900]     PHYSICAL EXAM-  Comfortable  Nausea?-   cv- rsr  Chest- clear, =  Abd- soft, previous umbilical hernia repair incision noted, suprapubic tenderness and guarding        Assessment and Plan:  Appreciate input from Dr. Arias who knows Ms. Hubbard from previous hospitalization.  Dr. Gordon's colonoscopy reviewed from approximately 2019.  Similar episode in the similar region earlier this year.  Hopefully, response to antibiotic therapy to permit interval resection.  If situation fails to improve, reimaging, possible surgery on this admission.    Kojo Barney MD  10/07/20  17:10 EDT  "

## 2020-10-07 NOTE — PLAN OF CARE
Problem: Adjustment to Illness (Sepsis/Septic Shock)  Goal: Optimal Coping  Outcome: Ongoing, Progressing     Problem: Hemodynamic Instability (Sepsis/Septic Shock)  Goal: Effective Tissue Perfusion  Outcome: Ongoing, Progressing  Intervention: Optimize Blood Flow  Recent Flowsheet Documentation  Taken 10/7/2020 0806 by Angie Gore RN  Fluid/Electrolyte Management: fluids provided     Problem: Infection (Sepsis/Septic Shock)  Goal: Absence of Infection Signs and Symptoms  Outcome: Ongoing, Progressing   Goal Outcome Evaluation:  Plan of Care Reviewed With: patient  Progress: improving  Outcome Summary: VSS. C/o gas pain this morning, relieved with PRN. Only c/o headache this afternoon. Pt is feeling better and tolerating diet advancement. IV abx continued. Possible surgery if no improvement. Will continue to monitor.

## 2020-10-07 NOTE — CONSULTS
[facility]    INPATIENT CONSULT NOTE / INITIAL HOSPITAL VISIT      PATIENT NAME:  Daja Hubbard  YOB: 1960  MEDICAL RECORD NUMBER:  [id number]    Date of Admission:  10/6/2020  Date of Consult: 10/7/2020    Requesting Provider: REBEL Sebastian MD  Evaluating Physician: Osmar Arias MD    Chief Complaint   Patient presents with   • Abdominal Pain       Reason for Consultation:   Recurrent diverticulitis    History of Present Illness:  Patient is a 60 y.o. female with a past medical history significant for diverticulitis who was admitted to [facility] on 10/6/2020 after presenting to emergency department with complaints of lower abdominal pain.   Patient states that she began having lower abdominal pain around 3 to 4 days ago.  Along with this, she had nausea and vomiting.  Minimal bowel movements.  I previously saw the patient in December 2019 for a bout of diverticulitis with microperforation, treated with a course of IV ertapenem at my office after hospitalization.  She was to follow-up with Dr. Barney.  At presentation this admission, her white blood cell count was up to 17,000 and repeat CT demonstrated marked inflammatory change of the mid sigmoid colon without abscess or perforation.  She was placed on IV Zosyn and admitted.  She has remained afebrile.  She continues to have significant lower abdominal pain though she is asking for more to take in by mouth.    I have been consulted to assist in workup and antimicrobial management of recurrent diverticulitis.    Past Medical History:   Diagnosis Date   • Cervical high risk human papillomavirus (HPV) DNA test positive 09/23/2015    NON 16/18 POSITIVE   • Cervical stricture or stenosis     cervical stricture and stenosis   • Depression    • Diverticulitis    • Fibromyalgia    • Hypertension    • Low grade squamous intraepithelial lesion (LGSIL) on cervical Pap smear 06/17/2020    colposcopy 06/17/2020 with  BX at 7 and ECC; unsatisfactory; thick WE extending inside OS   • Mild dysplasia of cervix     (DEV I)   • Mild dysplasia of cervix 2020    ECC positive LGSIL; colposcopy unsatisfactory with WE extending inside os.   • Mild dysplasia of cervix (DEV I) 2020   • Recurrent UTI    • Tobacco dependence        Past Surgical History:   Procedure Laterality Date   • BLADDER SURGERY     •  SECTION     • COLONOSCOPY     • COLPOSCOPY  2015   • HERNIA REPAIR     • KIDNEY STONE SURGERY     • LEEP  2020       Family History   Problem Relation Age of Onset   • Colon cancer Other    • Osteoporosis Other    • Alzheimer's disease Other        Social History     Socioeconomic History   • Marital status:      Spouse name: Not on file   • Number of children: Not on file   • Years of education: Not on file   • Highest education level: Not on file   Tobacco Use   • Smoking status: Former Smoker     Years: 20.00     Quit date: 2018     Years since quittin.8   • Smokeless tobacco: Never Used   Substance and Sexual Activity   • Alcohol use: Not Currently     Comment: Per [facility], occasional/no abuse   • Drug use: Not Currently   • Sexual activity: Yes     Comment: IUD check-Mirena IUD; due for removal 3/2013   Social History Narrative    , has a daughter, unemployed         Allergies:  Allergies   Allergen Reactions   • Sulfa Antibiotics Hives       Home Medications:  No current facility-administered medications on file prior to encounter.      Current Outpatient Medications on File Prior to Encounter   Medication Sig Dispense Refill   • citalopram (CeleXA) 40 MG tablet Take 40 mg by mouth Daily.     • conjugated estrogens (Premarin) 0.625 MG/GM vaginal cream Use 0.5 grams intravaginally 1-3 times per week to control symptoms. 1 each 6   • cyclobenzaprine (FLEXERIL) 10 MG tablet Take 10 mg by mouth 3 (Three) Times a Day As Needed for Muscle Spasms.     • ondansetron (ZOFRAN) 8 MG tablet  Take  by mouth Every 8 (Eight) Hours As Needed for Nausea or Vomiting.     • losartan (COZAAR) 25 MG tablet Take 50 mg by mouth Daily.         Current Hospital Medications:  Current Facility-Administered Medications   Medication Dose Route Frequency Provider Last Rate Last Dose   • citalopram (CeleXA) tablet 40 mg  40 mg Oral Daily Peng Sebastian MD   40 mg at 10/07/20 0809   • cyclobenzaprine (FLEXERIL) tablet 10 mg  10 mg Oral TID PRN Peng Sebastian MD       • enoxaparin (LOVENOX) syringe 40 mg  40 mg Subcutaneous Q24H DosPeng dickson MD   40 mg at 10/06/20 2045   • HYDROmorphone (DILAUDID) injection 0.5 mg  0.5 mg Intravenous Q2H PRN Peng Sebastian MD       • oxyCODONE-acetaminophen (PERCOCET) 7.5-325 MG per tablet 1 tablet  1 tablet Oral Q4H PRN Peng Sebastian MD   1 tablet at 10/06/20 2135   • piperacillin-tazobactam (ZOSYN) 3.375 g in iso-osmotic dextrose 50 ml (premix)  3.375 g Intravenous Q8H DosPeng dickson MD   3.375 g at 10/07/20 1116   • simethicone (MYLICON) chewable tablet 80 mg  80 mg Oral 4x Daily PRN Vangie Coppola DO   80 mg at 10/07/20 0946   • sodium chloride 0.9 % flush 10 mL  10 mL Intravenous PRN Elmer Devine MD       • sodium chloride 0.9 % flush 10 mL  10 mL Intravenous Q12H Peng Sebastian MD       • sodium chloride 0.9 % flush 10 mL  10 mL Intravenous PRN Peng Sebastian MD       • sodium chloride 0.9 % infusion  100 mL/hr Intravenous Continuous Peng Sebastian  mL/hr at 10/07/20 0407 100 mL/hr at 10/07/20 0407       Antimicrobials:  Anti-Infectives (From admission, onward)    Ordered     Dose/Rate Route Frequency Start Stop    10/06/20 1430  piperacillin-tazobactam (ZOSYN) 3.375 g in iso-osmotic dextrose 50 ml (premix)     Ordering Provider: Peng Sebastian MD    3.375 g  over 4 Hours Intravenous Every 8 Hours 10/06/20 1800 10/13/20 1759    10/06/20 1051  piperacillin-tazobactam (ZOSYN) 3.375 g in iso-osmotic dextrose 50 ml (premix)     Ordering Provider: Sanford Palmer  "ADAN Echeverria    3.375 g  over 30 Minutes Intravenous Once 10/06/20 1053 10/06/20 1150          Review of Systems:   Constitutional:  No fever, chills, or sweats.  Appetite fair.  +fatigue. No weight loss.  HEENT:  No new vision, hearing or throat complaints.  No oral sores.  No headache, photophobia or neck stiffness.  CV:  No chest pain, palpitations, or syncope.  Respiratory:  No SOB, cough, or hemoptysis.  GI:  +nausea/vomiting, no diarrhea.  Lower abdominal pain and bloating  :  No dysuria, hematuria, urgency, or flank pain.  Lymph:  No swollen lymph nodes in neck, axilla, or groin.   Hematologic:  No easy bruising or bleeding.  Musculoskeletal:  No new swelling or pain of joints.  No new back pain.  Neurologic:  No acute focal weakness or numbness.  No seizures.  Skin:  No rashes, ulcers, or lesions.       Vital Signs:  Temp (24hrs), Av.6 °F (37 °C), Min:98 °F (36.7 °C), Max:99.4 °F (37.4 °C)    /69 (BP Location: Left arm, Patient Position: Lying)   Pulse 80   Temp 98.8 °F (37.1 °C) (Oral)   Resp 18   Ht 162.6 cm (64\")   Wt 83.2 kg (183 lb 6.4 oz)   SpO2 95%   BMI 31.48 kg/m²     Physical Examination:  GENERAL: Awake and alert, in no acute distress.   LINES: Left upper arm peripheral IV intact  HEENT: Normocephalic, atraumatic.  PERRL. EOMI. No conjunctival injection or subconjunctival hemorrhage. No icterus. Oropharynx clear without evidence of thrush or exudate.   NECK: Supple without nuchal rigidity.   CV: RRR. No murmur, rubs, gallops.  Normal S1S2.  LUNGS: Clear to auscultation bilaterally without wheezing, rales, rhonchi. Normal respiratory effort.  ABDOMEN: Soft, diffuse abdominal tenderness to moderate palpation, nondistended. Positive bowel sounds. No rebound or guarding.    EXT:  No clubbing, cyanosis, or edema.  MSK: No joint effusions or inflammation noted.  SKIN: Warm and dry without rash or ulcer.  NEURO: A&Ox4. No focal deficits.  Face symmetric.  Speech fluent.  Moves all " extremities well.   PSYCHIATRIC: Normal insight and judgement.  Cooperative.  Normal affect.      Laboratory Data:    Results from last 7 days   Lab Units 10/07/20  0144 10/06/20  0958   WBC 10*3/mm3 16.83* 17.71*   HEMOGLOBIN g/dL 10.8* 11.9*   HEMATOCRIT % 33.6* 36.5   PLATELETS 10*3/mm3 225 235     Results from last 7 days   Lab Units 10/06/20  0958   SODIUM mmol/L 130*   POTASSIUM mmol/L 3.9   CHLORIDE mmol/L 97*   CO2 mmol/L 23.0   BUN mg/dL 17   CREATININE mg/dL 1.15*   GLUCOSE mg/dL 126*   CALCIUM mg/dL 8.9     Results from last 7 days   Lab Units 10/06/20  0958   ALK PHOS U/L 93   BILIRUBIN mg/dL 0.6   ALT (SGPT) U/L 7   AST (SGOT) U/L 11             Estimated Creatinine Clearance: 55 mL/min (A) (by C-G formula based on SCr of 1.15 mg/dL (H)).  Results from last 7 days   Lab Units 10/06/20  0958   LACTATE mmol/L 1.1                   Microbiology:  Microbiology Results (last 10 days)     Procedure Component Value - Date/Time    Blood Culture - Blood, Arm, Left [[id number]] Collected: 10/06/20 1113    Lab Status: Preliminary result Specimen: Blood from Arm, Left Updated: 10/07/20 1130     Blood Culture No growth at 24 hours    Blood Culture - Blood, Arm, Left [[id number]] Collected: 10/06/20 1113    Lab Status: Preliminary result Specimen: Blood from Arm, Left Updated: 10/07/20 1130     Blood Culture No growth at 24 hours    Urine Culture - Urine, Urine, Clean Catch [[id number]]  (Normal) Collected: 10/06/20 0958    Lab Status: Preliminary result Specimen: Urine, Clean Catch Updated: 10/07/20 1051     Urine Culture No growth              Radiology:  Imaging Results (Last 72 Hours)     Procedure Component Value Units Date/Time    CT Abdomen Pelvis With Contrast [[id number]] Collected: 10/06/20 1225     Updated: 10/06/20 1233    Narrative:      EXAMINATION: CT ABDOMEN PELVIS W CONTRAST-      INDICATION: LLQ pain, peritoneal signs, hx diverticulitis     TECHNIQUE: Axial IV contrast-enhanced CT of the abdomen and  pelvis with  multiplanar reconstructions.     The radiation dose reduction device was turned on for each scan per the  ALARA (As Low as Reasonably Achievable) protocol.     COMPARISON: 1/16/2020     FINDINGS: The lung bases are clear. No aggressive soft tissue body wall  findings. Sacral nerve stimulator noted. No acute fracture or aggressive  osseous lesion. The liver, spleen, pancreas and bilateral adrenal glands  demonstrate homogeneous enhancement without evidence of focal lesion.  The kidneys are unremarkable. No retroperitoneal adenopathy.  Atherosclerotic abdominal aorta. Small and large bowel loops are  nondilated. Similar in appearance and severity to multiple prior  studies, there is mucosal edema and adjacent fat stranding of the mid  sigmoid: With minimal associated diverticular change. No free air or  loculated free fluid concerning for abscess. The pelvic viscera are  unremarkable.       Impression:      Similar appearing short segment marked inflammatory change  of the midsigmoid colon with associated mucosal edema and wall  thickening. No evidence of perforation or abscess. While finding remains  somewhat compatible with acute diverticulitis, this appearance is  similar to multiple prior studies and there is again minimal associated  diverticulosis, somewhat discordant to the degree of inflammatory  change. Consider additional causes of colitis. Also consider nonemergent  colonoscopy when acute inflammatory process has subsided to exclude  underlying colon mass.        This report was finalized on 10/6/2020 12:30 PM by Savage Horvath.                 IMPRESSION:  60 y.o. female with history of diverticulitis who is admitted with several days of vomiting and lower abdominal pain.   Found again to have markedly inflamed mid sigmoid colon but without evidence of perforation or abscess.  She did have a significant leukocytosis.    PROBLEM LIST:   --Recurrent diverticulitis, uncomplicated.  Market  inflammation of sigmoid colon without perforation or abscess.  --Leukocytosis, due to the above.    PLAN:  --Continue IV piperacillin/tazobactam while in the hospital  --Advance diet as tolerated  --Supportive care with IV fluids and pain control as needed  --Monitor white blood cell count  --We can give again a course of IV ertapenem through a peripheral IV at my office for several days after discharge if needed.  Thankfully, there is no evidence for perforation or abscess at this time that would require prolonged IV antibiotics.  --She will need close follow-up with Dr. Barney for potential elective resection of the affected region due to recurrent episodes of infection    Thank you for the consultation.  I will continue to follow along with you.  Plan discussed with patient.    Osmar Arias MD  10/7/2020  13:57 EDT

## 2020-10-07 NOTE — PROGRESS NOTES
[facility] Medicine Services  PROGRESS NOTE    Patient Name: Daja Hubbard  : 1960  MRN: [id number]    Date of Admission: 10/6/2020  Primary Care Physician: Sp Marie MD    Subjective   Subjective     CC:  abd pain     HPI:  States she is feeling a bit better. Pain somewhat improved. Tolerating advanced diet.     Review of Systems  Gen- No fevers, chills  CV- No chest pain, palpitations  Resp- No cough, dyspnea  GI- No N/V/D, + abd pain       Objective   Objective     Vital Signs:   Temp:  [98 °F (36.7 °C)-99.4 °F (37.4 °C)] 98.8 °F (37.1 °C)  Heart Rate:  [57-86] 80  Resp:  [16-18] 18  BP: (108-125)/(67-85) 120/69        Physical Exam:  Constitutional: No acute distress, awake, alert  HENT: NCAT, mucous membranes moist  Respiratory: Clear to auscultation bilaterally, respiratory effort normal   Cardiovascular: RRR, no murmurs, rubs, or gallops, palpable pedal pulses bilaterally  Gastrointestinal: Positive bowel sounds, soft, mild tenderness   Musculoskeletal: No bilateral ankle edema  Psychiatric: Appropriate affect, cooperative  Neurologic: Oriented x 3, strength symmetric in all extremities, Cranial Nerves grossly intact to confrontation, speech clear  Skin: No rashes    Results Reviewed:  Results from last 7 days   Lab Units 10/07/20  0144 10/06/20  0958   WBC 10*3/mm3 16.83* 17.71*   HEMOGLOBIN g/dL 10.8* 11.9*   HEMATOCRIT % 33.6* 36.5   PLATELETS 10*3/mm3 225 235   PROCALCITONIN ng/mL  --  0.81*     Results from last 7 days   Lab Units 10/06/20  0958   SODIUM mmol/L 130*   POTASSIUM mmol/L 3.9   CHLORIDE mmol/L 97*   CO2 mmol/L 23.0   BUN mg/dL 17   CREATININE mg/dL 1.15*   GLUCOSE mg/dL 126*   CALCIUM mg/dL 8.9   ALT (SGPT) U/L 7   AST (SGOT) U/L 11     Estimated Creatinine Clearance: 55 mL/min (A) (by C-G formula based on SCr of 1.15 mg/dL (H)).    Microbiology Results Abnormal     Procedure Component Value - Date/Time    Blood Culture - Blood, Arm, Left  [[id number]] Collected: 10/06/20 1113    Lab Status: Preliminary result Specimen: Blood from Arm, Left Updated: 10/07/20 1130     Blood Culture No growth at 24 hours    Blood Culture - Blood, Arm, Left [[id number]] Collected: 10/06/20 1113    Lab Status: Preliminary result Specimen: Blood from Arm, Left Updated: 10/07/20 1130     Blood Culture No growth at 24 hours    Urine Culture - Urine, Urine, Clean Catch [[id number]]  (Normal) Collected: 10/06/20 0958    Lab Status: Preliminary result Specimen: Urine, Clean Catch Updated: 10/07/20 1051     Urine Culture No growth          Imaging Results (Last 24 Hours)     ** No results found for the last 24 hours. **               I have reviewed the medications:  Scheduled Meds:citalopram, 40 mg, Oral, Daily  enoxaparin, 40 mg, Subcutaneous, Q24H  piperacillin-tazobactam, 3.375 g, Intravenous, Q8H  sodium chloride, 10 mL, Intravenous, Q12H      Continuous Infusions:sodium chloride, 100 mL/hr, Last Rate: 100 mL/hr (10/07/20 0407)      PRN Meds:.cyclobenzaprine  •  HYDROmorphone  •  oxyCODONE-acetaminophen  •  simethicone  •  sodium chloride  •  sodium chloride    Assessment/Plan   Assessment & Plan     Active Hospital Problems    Diagnosis  POA   • **SIRS (systemic inflammatory response syndrome) (CMS/HCC) [R65.10]  Yes   • Hyponatremia [E87.1]  Yes   • Sigmoid diverticulitis [K57.32]  Yes   • Essential hypertension [I10]  Yes      Resolved Hospital Problems   No resolved problems to display.        Brief Hospital Course to date:  Patient is a 60-year-old female  who presents with episodes of recurrent diverticulitis and severe left lower quadrant abdominal pain.      SIRS  Diverticulitis   - ID and CRS consulted  - imaging reviewed - no evidence of perforation   - Continue zosyn  - Continue fluid and PRN pain control   - advance diet     Hyponatremia   - Monitor with fluids     DVT prophylaxis:  lovenox      Disposition: I expect the patient to be discharged home in 1-3 days  pending improvement/antibiotic plan     CODE STATUS:   Code Status and Medical Interventions:   Ordered at: 10/06/20 1421     Level Of Support Discussed With:    Patient     Code Status:    CPR     Medical Interventions (Level of Support Prior to Arrest):    Full       Vangie Coppola DO  10/07/20

## 2020-10-08 LAB
ANION GAP SERPL CALCULATED.3IONS-SCNC: 10 MMOL/L (ref 5–15)
BASOPHILS # BLD AUTO: 0.02 10*3/MM3 (ref 0–0.2)
BASOPHILS NFR BLD AUTO: 0.2 % (ref 0–1.5)
BUN SERPL-MCNC: 7 MG/DL (ref 6–20)
BUN/CREAT SERPL: 7.8 (ref 7–25)
CALCIUM SPEC-SCNC: 8.4 MG/DL (ref 8.6–10.5)
CHLORIDE SERPL-SCNC: 103 MMOL/L (ref 98–107)
CO2 SERPL-SCNC: 21 MMOL/L (ref 22–29)
CREAT SERPL-MCNC: 0.9 MG/DL (ref 0.57–1)
DEPRECATED RDW RBC AUTO: 43.5 FL (ref 37–54)
EOSINOPHIL # BLD AUTO: 0.03 10*3/MM3 (ref 0–0.4)
EOSINOPHIL NFR BLD AUTO: 0.3 % (ref 0.3–6.2)
ERYTHROCYTE [DISTWIDTH] IN BLOOD BY AUTOMATED COUNT: 12.8 % (ref 12.3–15.4)
GFR SERPL CREATININE-BSD FRML MDRD: 64 ML/MIN/1.73
GLUCOSE SERPL-MCNC: 103 MG/DL (ref 65–99)
HCT VFR BLD AUTO: 29 % (ref 34–46.6)
HGB BLD-MCNC: 9.4 G/DL (ref 12–15.9)
IMM GRANULOCYTES # BLD AUTO: 0.04 10*3/MM3 (ref 0–0.05)
IMM GRANULOCYTES NFR BLD AUTO: 0.4 % (ref 0–0.5)
LYMPHOCYTES # BLD AUTO: 1.75 10*3/MM3 (ref 0.7–3.1)
LYMPHOCYTES NFR BLD AUTO: 16.9 % (ref 19.6–45.3)
MCH RBC QN AUTO: 30 PG (ref 26.6–33)
MCHC RBC AUTO-ENTMCNC: 32.4 G/DL (ref 31.5–35.7)
MCV RBC AUTO: 92.7 FL (ref 79–97)
MONOCYTES # BLD AUTO: 1.1 10*3/MM3 (ref 0.1–0.9)
MONOCYTES NFR BLD AUTO: 10.6 % (ref 5–12)
NEUTROPHILS NFR BLD AUTO: 7.44 10*3/MM3 (ref 1.7–7)
NEUTROPHILS NFR BLD AUTO: 71.6 % (ref 42.7–76)
NRBC BLD AUTO-RTO: 0 /100 WBC (ref 0–0.2)
PLATELET # BLD AUTO: 221 10*3/MM3 (ref 140–450)
PMV BLD AUTO: 11.6 FL (ref 6–12)
POTASSIUM SERPL-SCNC: 3.9 MMOL/L (ref 3.5–5.2)
RBC # BLD AUTO: 3.13 10*6/MM3 (ref 3.77–5.28)
SODIUM SERPL-SCNC: 134 MMOL/L (ref 136–145)
WBC # BLD AUTO: 10.38 10*3/MM3 (ref 3.4–10.8)

## 2020-10-08 PROCEDURE — 25010000002 PIPERACILLIN SOD-TAZOBACTAM PER 1 G: Performed by: INTERNAL MEDICINE

## 2020-10-08 PROCEDURE — 85025 COMPLETE CBC W/AUTO DIFF WBC: CPT | Performed by: INTERNAL MEDICINE

## 2020-10-08 PROCEDURE — 99232 SBSQ HOSP IP/OBS MODERATE 35: CPT | Performed by: INTERNAL MEDICINE

## 2020-10-08 PROCEDURE — 25010000002 ENOXAPARIN PER 10 MG: Performed by: INTERNAL MEDICINE

## 2020-10-08 PROCEDURE — 80048 BASIC METABOLIC PNL TOTAL CA: CPT | Performed by: INTERNAL MEDICINE

## 2020-10-08 RX ORDER — ACETAMINOPHEN 325 MG/1
650 TABLET ORAL EVERY 6 HOURS PRN
Status: DISCONTINUED | OUTPATIENT
Start: 2020-10-08 | End: 2020-10-12 | Stop reason: HOSPADM

## 2020-10-08 RX ORDER — DOCUSATE SODIUM 100 MG/1
100 CAPSULE, LIQUID FILLED ORAL 2 TIMES DAILY PRN
Status: DISCONTINUED | OUTPATIENT
Start: 2020-10-08 | End: 2020-10-08

## 2020-10-08 RX ORDER — LOSARTAN POTASSIUM 50 MG/1
50 TABLET ORAL
Status: DISCONTINUED | OUTPATIENT
Start: 2020-10-08 | End: 2020-10-12 | Stop reason: HOSPADM

## 2020-10-08 RX ORDER — L.ACID,PARA/B.BIFIDUM/S.THERM 8B CELL
1 CAPSULE ORAL DAILY
Status: DISCONTINUED | OUTPATIENT
Start: 2020-10-08 | End: 2020-10-12 | Stop reason: HOSPADM

## 2020-10-08 RX ORDER — DOCUSATE SODIUM 100 MG/1
100 CAPSULE, LIQUID FILLED ORAL 2 TIMES DAILY
Status: DISCONTINUED | OUTPATIENT
Start: 2020-10-08 | End: 2020-10-12 | Stop reason: HOSPADM

## 2020-10-08 RX ADMIN — ENOXAPARIN SODIUM 40 MG: 40 INJECTION SUBCUTANEOUS at 21:05

## 2020-10-08 RX ADMIN — TAZOBACTAM SODIUM AND PIPERACILLIN SODIUM 3.38 G: 375; 3 INJECTION, SOLUTION INTRAVENOUS at 18:26

## 2020-10-08 RX ADMIN — ACETAMINOPHEN 650 MG: 325 TABLET, FILM COATED ORAL at 23:45

## 2020-10-08 RX ADMIN — TAZOBACTAM SODIUM AND PIPERACILLIN SODIUM 3.38 G: 375; 3 INJECTION, SOLUTION INTRAVENOUS at 12:31

## 2020-10-08 RX ADMIN — CITALOPRAM 40 MG: 40 TABLET, FILM COATED ORAL at 09:19

## 2020-10-08 RX ADMIN — LOSARTAN POTASSIUM 50 MG: 50 TABLET, FILM COATED ORAL at 15:24

## 2020-10-08 RX ADMIN — DOCUSATE SODIUM 100 MG: 100 CAPSULE, LIQUID FILLED ORAL at 00:48

## 2020-10-08 RX ADMIN — Medication 1 CAPSULE: at 15:24

## 2020-10-08 RX ADMIN — OXYCODONE HYDROCHLORIDE AND ACETAMINOPHEN 1 TABLET: 7.5; 325 TABLET ORAL at 15:24

## 2020-10-08 RX ADMIN — SODIUM CHLORIDE, PRESERVATIVE FREE 10 ML: 5 INJECTION INTRAVENOUS at 09:20

## 2020-10-08 RX ADMIN — ACETAMINOPHEN 650 MG: 325 TABLET, FILM COATED ORAL at 04:41

## 2020-10-08 RX ADMIN — OXYCODONE HYDROCHLORIDE AND ACETAMINOPHEN 1 TABLET: 7.5; 325 TABLET ORAL at 09:19

## 2020-10-08 RX ADMIN — TAZOBACTAM SODIUM AND PIPERACILLIN SODIUM 3.38 G: 375; 3 INJECTION, SOLUTION INTRAVENOUS at 02:06

## 2020-10-08 RX ADMIN — DOCUSATE SODIUM 100 MG: 100 CAPSULE ORAL at 21:05

## 2020-10-08 RX ADMIN — DOCUSATE SODIUM 100 MG: 100 CAPSULE ORAL at 09:19

## 2020-10-08 RX ADMIN — SIMETHICONE 80 MG: 80 TABLET, CHEWABLE ORAL at 15:24

## 2020-10-08 RX ADMIN — SIMETHICONE 80 MG: 80 TABLET, CHEWABLE ORAL at 09:19

## 2020-10-08 NOTE — PROGRESS NOTES
[facility] Medicine Services  PROGRESS NOTE    Patient Name: Daja Hubbard  : 1960  MRN: [id number]    Date of Admission: 10/6/2020  Primary Care Physician: Sp Marie MD    Subjective   Subjective     CC:  Diverticulitis, abd pain     HPI:  States she is feeling a bit better but still having significant crampy abd pain which improves when she passes gas. Tolerating PO intake. No BM.     Review of Systems  Gen- No fevers, chills  CV- No chest pain, palpitations  Resp- No cough, dyspnea  GI- No N/V/D,+  abd pain     Objective   Objective     Vital Signs:   Temp:  [97.9 °F (36.6 °C)-100.2 °F (37.9 °C)] 97.9 °F (36.6 °C)  Heart Rate:  [73-97] 77  Resp:  [16-17] 16  BP: (122-137)/(64-91) 137/84        Physical Exam:  Constitutional: No acute distress, awake, alert  HENT: NCAT, mucous membranes moist  Respiratory: Clear to auscultation bilaterally, respiratory effort normal   Cardiovascular: RRR, no murmurs, rubs, or gallops, palpable pedal pulses bilaterally  Gastrointestinal: Positive bowel sounds, soft, tender lower abd   Musculoskeletal: No bilateral ankle edema  Psychiatric: Appropriate affect, cooperative  Neurologic: Oriented x 3, strength symmetric in all extremities, Cranial Nerves grossly intact to confrontation, speech clear  Skin: No rashes    Results Reviewed:  Results from last 7 days   Lab Units 10/08/20  0300 10/07/20  0144 10/06/20  0958   WBC 10*3/mm3 10.38 16.83* 17.71*   HEMOGLOBIN g/dL 9.4* 10.8* 11.9*   HEMATOCRIT % 29.0* 33.6* 36.5   PLATELETS 10*3/mm3 221 225 235   PROCALCITONIN ng/mL  --   --  0.81*     Results from last 7 days   Lab Units 10/08/20  0300 10/06/20  0958   SODIUM mmol/L 134* 130*   POTASSIUM mmol/L 3.9 3.9   CHLORIDE mmol/L 103 97*   CO2 mmol/L 21.0* 23.0   BUN mg/dL 7 17   CREATININE mg/dL 0.90 1.15*   GLUCOSE mg/dL 103* 126*   CALCIUM mg/dL 8.4* 8.9   ALT (SGPT) U/L  --  7   AST (SGOT) U/L  --  11     Estimated Creatinine Clearance: 70.2  mL/min (by C-G formula based on SCr of 0.9 mg/dL).    Microbiology Results Abnormal     Procedure Component Value - Date/Time    Blood Culture - Blood, Arm, Left [[id number]] Collected: 10/06/20 1113    Lab Status: Preliminary result Specimen: Blood from Arm, Left Updated: 10/08/20 1130     Blood Culture No growth at 2 days    Blood Culture - Blood, Arm, Left [[id number]] Collected: 10/06/20 1113    Lab Status: Preliminary result Specimen: Blood from Arm, Left Updated: 10/08/20 1130     Blood Culture No growth at 2 days    Urine Culture - Urine, Urine, Clean Catch [[id number]]  (Normal) Collected: 10/06/20 0958    Lab Status: Final result Specimen: Urine, Clean Catch Updated: 10/07/20 2148     Urine Culture No growth          Imaging Results (Last 24 Hours)     ** No results found for the last 24 hours. **               I have reviewed the medications:  Scheduled Meds:citalopram, 40 mg, Oral, Daily  docusate sodium, 100 mg, Oral, BID  enoxaparin, 40 mg, Subcutaneous, Q24H  piperacillin-tazobactam, 3.375 g, Intravenous, Q8H  sodium chloride, 10 mL, Intravenous, Q12H      Continuous Infusions:   PRN Meds:.•  acetaminophen  •  cyclobenzaprine  •  HYDROmorphone  •  oxyCODONE-acetaminophen  •  simethicone  •  sodium chloride  •  sodium chloride    Assessment/Plan   Assessment & Plan     Active Hospital Problems    Diagnosis  POA   • **SIRS (systemic inflammatory response syndrome) (CMS/HCC) [R65.10]  Yes   • Hyponatremia [E87.1]  Yes   • Sigmoid diverticulitis [K57.32]  Yes   • Essential hypertension [I10]  Yes      Resolved Hospital Problems   No resolved problems to display.        Brief Hospital Course to date:  Patient is a 60-year-old female  who presents with episodes of recurrent diverticulitis and severe left lower quadrant abdominal pain.       SIRS  Diverticulitis   - ID and CRS consulted  - imaging reviewed - no evidence of perforation   - Continue zosyn -- likely transition to Augmentin on discharge once pain is  improved   - PRN pain control; tolerating PO intake      Hyponatremia - improved     Constipation   - docusate; probiotic     HTN   - BP improved as well as renal function  - Resume losartan      DVT prophylaxis:  lovenox        Disposition: I expect the patient to be discharged home in 1-3 days pending improvement/antibiotic plan     CODE STATUS:   Code Status and Medical Interventions:   Ordered at: 10/06/20 1421     Level Of Support Discussed With:    Patient     Code Status:    CPR     Medical Interventions (Level of Support Prior to Arrest):    Full       Vangie Coppola DO  10/08/20

## 2020-10-08 NOTE — PLAN OF CARE
Goal Outcome Evaluation:  Plan of Care Reviewed With: patient  Progress: improving  Outcome Summary: Pt c/o abdominal tenderness but requests no PRN pain medications. C/o gas pain with relief when given PRN simethicone. Pt also states that she has not had a BM since 10/3, provider notified and colace ordered. VS stable, 2L NC applied due to pts nocturnal desaturation. NSR per telemetry. Will continue to monitor.

## 2020-10-08 NOTE — PROGRESS NOTES
"[facility] ([facility])      Abdominal pain  Some relief with bowel movement this morning which was fairly normal    Vital Signs:  Blood pressure 132/78, pulse 77, temperature 98 °F (36.7 °C), temperature source Oral, resp. rate 16, height 162.6 cm (64\"), weight 83.2 kg (183 lb 6.4 oz), SpO2 96 %.    Labs past 24 hours:  Lab Results (last 24 hours)     Procedure Component Value Units Date/Time    COVID PRE-OP / PRE-PROCEDURE SCREENING ORDER (NO ISOLATION) - Swab, Nasopharynx [[id number]] Collected: 10/06/20 1435    Specimen: Swab from Nasopharynx Updated: 10/08/20 1535    Narrative:      The following orders were created for panel order COVID PRE-OP / PRE-PROCEDURE SCREENING ORDER (NO ISOLATION) - Swab, Nasopharynx.  Procedure                               Abnormality         Status                     ---------                               -----------         ------                     COVID-19,[facility], NP ...[[id number]]                      Final result                 Please view results for these tests on the individual orders.    COVID-19,[facility], NP SWAB IN [facility]EXAR VIRAL TRANSPORT MEDIA 24-30 HR TAT - Swab, Nasopharynx [[id number]] Collected: 10/06/20 1435    Specimen: Swab from Nasopharynx Updated: 10/08/20 1535     SARS-CoV-2 WILLIAN Not Detected    Blood Culture - Blood, Arm, Left [[id number]] Collected: 10/06/20 1113    Specimen: Blood from Arm, Left Updated: 10/08/20 1130     Blood Culture No growth at 2 days    Blood Culture - Blood, Arm, Left [[id number]] Collected: 10/06/20 1113    Specimen: Blood from Arm, Left Updated: 10/08/20 1130     Blood Culture No growth at 2 days    Basic Metabolic Panel [[id number]]  (Abnormal) Collected: 10/08/20 0300    Specimen: Blood Updated: 10/08/20 0347     Glucose 103 mg/dL      BUN 7 mg/dL      Creatinine 0.90 mg/dL      Sodium 134 mmol/L      Potassium 3.9 mmol/L      Chloride 103 mmol/L      CO2 21.0 mmol/L      Calcium 8.4 mg/dL      " eGFR Non African Amer 64 mL/min/1.73      BUN/Creatinine Ratio 7.8     Anion Gap 10.0 mmol/L     Narrative:      GFR Normal >60  Chronic Kidney Disease <60  Kidney Failure <15      CBC & Differential [[id number]]  (Abnormal) Collected: 10/08/20 0300    Specimen: Blood Updated: 10/08/20 0324    Narrative:      The following orders were created for panel order CBC & Differential.  Procedure                               Abnormality         Status                     ---------                               -----------         ------                     CBC Auto Differential[[id number]]        Abnormal            Final result                 Please view results for these tests on the individual orders.    CBC Auto Differential [[id number]]  (Abnormal) Collected: 10/08/20 0300    Specimen: Blood Updated: 10/08/20 0324     WBC 10.38 10*3/mm3      RBC 3.13 10*6/mm3      Hemoglobin 9.4 g/dL      Hematocrit 29.0 %      MCV 92.7 fL      MCH 30.0 pg      MCHC 32.4 g/dL      RDW 12.8 %      RDW-SD 43.5 fl      MPV 11.6 fL      Platelets 221 10*3/mm3      Neutrophil % 71.6 %      Lymphocyte % 16.9 %      Monocyte % 10.6 %      Eosinophil % 0.3 %      Basophil % 0.2 %      Immature Grans % 0.4 %      Neutrophils, Absolute 7.44 10*3/mm3      Lymphocytes, Absolute 1.75 10*3/mm3      Monocytes, Absolute 1.10 10*3/mm3      Eosinophils, Absolute 0.03 10*3/mm3      Basophils, Absolute 0.02 10*3/mm3      Immature Grans, Absolute 0.04 10*3/mm3      nRBC 0.0 /100 WBC     Urine Culture - Urine, Urine, Clean Catch [[id number]]  (Normal) Collected: 10/06/20 0958    Specimen: Urine, Clean Catch Updated: 10/07/20 2148     Urine Culture No growth          I/O last shift:  I/O this shift:  In: 600 [P.O.:600]  Out: 400 [Urine:400]     PHYSICAL EXAM-  Comfortable  cv- rsr  Chest- clear, =  Abd- soft, mild distention, Suprapubic and left lower quadrant tenderness with guarding-some improvement.    Assessment and Plan:  Abdominal pain  Some relief with  bowel movement this morning which was fairly normal    Significant episode  After the white count failed to improve over the first 24 hours, finally seeing improvement today  Anemia noted    Still with significant pain and tenderness    Diet as tolerated    Patient lives far away and does not have easy access to ongoing IV antibiotic therapy.  Symptoms and pain level will help guide discharge planning    If failure to improve, low threshold for reimaging to look for abscess that could be associated with smoldering difficulties even in the setting of resolved leukocytosis.    Kojo Barney MD  10/08/20  18:08 EDT

## 2020-10-08 NOTE — PROGRESS NOTES
Continued Stay Note  [facility]     Patient Name: Daja Hubbard  MRN: [id number]  Today's Date: 10/8/2020    Admit Date: 10/6/2020    Discharge Plan     Row Name 10/08/20 1458       Plan    Plan  Home at DC    Patient/Family in Agreement with Plan  other (see comments)    Plan Comments  I did not disturb the pt. No new DC needs at this time.    Final Discharge Disposition Code  01 - home or self-care        Discharge Codes    No documentation.       Expected Discharge Date and Time     Expected Discharge Date Expected Discharge Time    Oct 9, 2020             Coleen Saldana RN

## 2020-10-08 NOTE — PLAN OF CARE
Goal Outcome Evaluation:  Plan of Care Reviewed With: patient  Progress: improving  Outcome Summary: Patient VSS, reports pain has improved, probiotic started, will convert to PO abx's tomorrow for d/c with plans for OP surgery with Dr. Barney. Pt in NAD and comfortable with that plan. Will continue to monitor.        Problem: Adult Inpatient Plan of Care  Goal: Plan of Care Review  Outcome: Ongoing, Progressing  Flowsheets (Taken 10/8/2020 1615)  Progress: improving  Plan of Care Reviewed With: patient  Outcome Summary: Patient VSS, reports pain has improved, probiotic started, will convert to PO abx's tomorrow for d/c with plans for OP surgery with Dr. Barney. Pt in NAD and comfortable with that plan. Will continue to monitor.  Goal: Patient-Specific Goal (Individualized)  Outcome: Ongoing, Progressing  Goal: Absence of Hospital-Acquired Illness or Injury  Outcome: Ongoing, Progressing  Intervention: Identify and Manage Fall Risk  Recent Flowsheet Documentation  Taken 10/8/2020 1600 by Aleena Padilla RN  Safety Promotion/Fall Prevention:   activity supervised   safety round/check completed   nonskid shoes/slippers when out of bed  Taken 10/8/2020 1400 by Aleena Padilla RN  Safety Promotion/Fall Prevention:   activity supervised   safety round/check completed   nonskid shoes/slippers when out of bed  Taken 10/8/2020 1200 by Aleena Padilla RN  Safety Promotion/Fall Prevention: activity supervised  Taken 10/8/2020 1000 by Aleena Padilla RN  Safety Promotion/Fall Prevention:   activity supervised   safety round/check completed   nonskid shoes/slippers when out of bed  Taken 10/8/2020 0918 by Aleena Padilla, RN  Safety Promotion/Fall Prevention:   activity supervised   safety round/check completed   nonskid shoes/slippers when out of bed  Intervention: Prevent Skin Injury  Recent Flowsheet Documentation  Taken 10/8/2020 1600 by Aleena Padilla RN  Body Position: position changed independently  Taken  10/8/2020 1400 by Aleena Padilla RN  Body Position: position changed independently  Taken 10/8/2020 1200 by Aleena Padilla RN  Body Position: position changed independently  Taken 10/8/2020 1000 by Aleena Padilla RN  Body Position: position changed independently  Taken 10/8/2020 0918 by Aleena Padilla RN  Body Position: position changed independently  Intervention: Prevent and Manage VTE (venous thromboembolism) Risk  Recent Flowsheet Documentation  Taken 10/8/2020 0918 by Aleena Padilla RN  VTE Prevention/Management:   bilateral   dorsiflexion/plantar flexion performed  Goal: Optimal Comfort and Wellbeing  Outcome: Ongoing, Progressing  Intervention: Provide Person-Centered Care  Recent Flowsheet Documentation  Taken 10/8/2020 1400 by Aleena Padilla RN  Trust Relationship/Rapport:   care explained   choices provided   emotional support provided   empathic listening provided   questions answered   questions encouraged   reassurance provided   thoughts/feelings acknowledged  Taken 10/8/2020 1200 by Aleena Padilla RN  Trust Relationship/Rapport:   care explained   choices provided   emotional support provided   empathic listening provided   questions answered   questions encouraged   reassurance provided   thoughts/feelings acknowledged  Taken 10/8/2020 1000 by Aleena Padilla RN  Trust Relationship/Rapport:   care explained   choices provided   emotional support provided   empathic listening provided   questions answered   questions encouraged   reassurance provided   thoughts/feelings acknowledged  Taken 10/8/2020 0918 by Aleena Padilla RN  Trust Relationship/Rapport:   care explained   choices provided   emotional support provided   empathic listening provided   questions answered   questions encouraged   reassurance provided   thoughts/feelings acknowledged  Goal: Readiness for Transition of Care  Outcome: Ongoing, Progressing     Problem: Bleeding (Sepsis/Septic Shock)  Goal:  Absence of Bleeding  Outcome: Ongoing, Progressing     Problem: Adjustment to Illness (Sepsis/Septic Shock)  Goal: Optimal Coping  Outcome: Ongoing, Progressing  Intervention: Optimize Psychosocial Adjustment to Illness  Recent Flowsheet Documentation  Taken 10/8/2020 0918 by Aleena Padilla, RN  Family/Support System Care: self-care encouraged     Problem: Glycemic Control Impaired (Sepsis/Septic Shock)  Goal: Blood Glucose Level Within Desired Range  Outcome: Ongoing, Progressing     Problem: Hemodynamic Instability (Sepsis/Septic Shock)  Goal: Effective Tissue Perfusion  Outcome: Ongoing, Progressing     Problem: Infection (Sepsis/Septic Shock)  Goal: Absence of Infection Signs and Symptoms  Outcome: Ongoing, Progressing     Problem: UTI (Urinary Tract Infection)  Goal: Improved Infection Symptoms  Outcome: Ongoing, Progressing

## 2020-10-08 NOTE — PROGRESS NOTES
"[facility]    INPATIENT PROGRESS NOTE  10/8/2020      PATIENT NAME: Daja Hubbard  :  1960  MRN:  [id number]  Date of Admission:  10/6/2020      Antimicrobials:  Anti-Infectives (From admission, onward)    Ordered     Dose/Rate Route Frequency Start Stop    10/06/20 1430  piperacillin-tazobactam (ZOSYN) 3.375 g in iso-osmotic dextrose 50 ml (premix)     Ordering Provider: Peng Sebastian MD    3.375 g  over 4 Hours Intravenous Every 8 Hours 10/06/20 1800 10/13/20 1759    10/06/20 1051  piperacillin-tazobactam (ZOSYN) 3.375 g in iso-osmotic dextrose 50 ml (premix)     Ordering Provider: Sanford Palmer PA-C    3.375 g  over 30 Minutes Intravenous Once 10/06/20 1053 10/06/20 1150          MAR reviewed.      Chief Complaint   Patient presents with   • Abdominal Pain       Reason for consultation: Recurrent diverticulitis    Interval history:  Patient required a couple of liters of oxygen overnight.  She is not taking good breaths with her abdominal pain.  She continues to have significant abdominal pain and some bloating.  She had a fairly normal bowel movement this morning.  She is tolerating solid food.  She had a low-grade temperature elevation early this morning.  However, her white blood cell count is much improved today.    ROS:  As above.  No new rashes.  No SOB or chest pain.  Denies side effects from antimicrobials.      Objective:  Temp (24hrs), Av.1 °F (37.3 °C), Min:97.9 °F (36.6 °C), Max:100.2 °F (37.9 °C)    /84   Pulse 76   Temp 97.9 °F (36.6 °C) (Oral)   Resp 16   Ht 162.6 cm (64\")   Wt 83.2 kg (183 lb 6.4 oz)   SpO2 96%   BMI 31.48 kg/m²     Physical Examination:  GENERAL: Awake and alert, in no acute distress.   LINES: Peripheral IV intact  HEENT: EOMI. No conjunctival injection or subconjunctival hemorrhage. No icterus.  CV: RRR. No murmur, rubs, gallops.  Normal S1S2.  LUNGS: Clear to auscultation bilaterally without wheezing, rales, " rhonchi. Normal respiratory effort.  ABDOMEN: Soft, diffuse abdominal tenderness to moderate palpation, mildly distended. Positive bowel sounds. No rebound or guarding.    EXT:  No clubbing, cyanosis, or edema.  MSK: No joint effusions or inflammation noted.  SKIN: Warm and dry without rash or ulcer.  NEURO: A&Ox4. No focal deficits.  Face symmetric.  Speech fluent.  Moves all extremities well.   PSYCHIATRIC: Normal insight and judgement.  Cooperative.  Normal affect.       Laboratory Data:    Results from last 7 days   Lab Units 10/08/20  0300 10/07/20  0144 10/06/20  0958   WBC 10*3/mm3 10.38 16.83* 17.71*   HEMOGLOBIN g/dL 9.4* 10.8* 11.9*   HEMATOCRIT % 29.0* 33.6* 36.5   PLATELETS 10*3/mm3 221 225 235     Results from last 7 days   Lab Units 10/08/20  0300   SODIUM mmol/L 134*   POTASSIUM mmol/L 3.9   CHLORIDE mmol/L 103   CO2 mmol/L 21.0*   BUN mg/dL 7   CREATININE mg/dL 0.90   GLUCOSE mg/dL 103*   CALCIUM mg/dL 8.4*     Results from last 7 days   Lab Units 10/06/20  0958   ALK PHOS U/L 93   BILIRUBIN mg/dL 0.6   ALT (SGPT) U/L 7   AST (SGOT) U/L 11             Estimated Creatinine Clearance: 70.2 mL/min (by C-G formula based on SCr of 0.9 mg/dL).  Results from last 7 days   Lab Units 10/06/20  0958   LACTATE mmol/L 1.1                   Microbiology:  Microbiology Results (last 10 days)     Procedure Component Value - Date/Time    Blood Culture - Blood, Arm, Left [[id number]] Collected: 10/06/20 1113    Lab Status: Preliminary result Specimen: Blood from Arm, Left Updated: 10/08/20 1130     Blood Culture No growth at 2 days    Blood Culture - Blood, Arm, Left [[id number]] Collected: 10/06/20 1113    Lab Status: Preliminary result Specimen: Blood from Arm, Left Updated: 10/08/20 1130     Blood Culture No growth at 2 days    Urine Culture - Urine, Urine, Clean Catch [[id number]]  (Normal) Collected: 10/06/20 0958    Lab Status: Final result Specimen: Urine, Clean Catch Updated: 10/07/20 2148     Urine Culture No  growth            Radiology:  Ct Abdomen Pelvis With Contrast    Result Date: 10/6/2020  Similar appearing short segment marked inflammatory change of the midsigmoid colon with associated mucosal edema and wall thickening. No evidence of perforation or abscess. While finding remains somewhat compatible with acute diverticulitis, this appearance is similar to multiple prior studies and there is again minimal associated diverticulosis, somewhat discordant to the degree of inflammatory change. Consider additional causes of colitis. Also consider nonemergent colonoscopy when acute inflammatory process has subsided to exclude underlying colon mass.   This report was finalized on 10/6/2020 12:30 PM by Savage Horvath.          DISCUSSION:  60 y.o. female with history of diverticulitis who is admitted with several days of vomiting and lower abdominal pain.   Found again to have markedly inflamed mid sigmoid colon but without evidence of perforation or abscess.  She did have a significant leukocytosis.    PROBLEM LIST:   --Recurrent diverticulitis, uncomplicated.  Marked inflammation of sigmoid colon without perforation or abscess.  --Leukocytosis, due to the above.  Much improved.  --Transient mild hypoxia, probably due to poor respiratory effort with abdominal pain.    PLAN:  --Continue IV Zosyn  --Following white blood cell count.  I am encouraged this has quickly improved.  --Following examination.  Though her white blood cell count was much improved, she continues to have significant abdominal pain, which is somewhat concerning.  --Patient tells me she cannot come to my office daily as she lives a distance away and will not have assistance with transportation this time.  She is tolerating solid foods at this point and her white blood count is much improved with no evidence for abscess or perforation.  I will tentatively plan to change to oral antibiotics (probably Augmentin) when ready for discharge.  --Dr. Barney to  follow re: timing and need of potential surgery  --Add incentive spirometer    Plan discussed with patient.  I explained to the patient that she needs to remain hospitalized until her pain is improved further.    Osmar Arias MD  10/8/2020  12:07 EDT

## 2020-10-09 ENCOUNTER — APPOINTMENT (OUTPATIENT)
Dept: CT IMAGING | Facility: HOSPITAL | Age: 60
End: 2020-10-09

## 2020-10-09 LAB
ANION GAP SERPL CALCULATED.3IONS-SCNC: 10 MMOL/L (ref 5–15)
BASOPHILS # BLD AUTO: 0.02 10*3/MM3 (ref 0–0.2)
BASOPHILS NFR BLD AUTO: 0.2 % (ref 0–1.5)
BUN SERPL-MCNC: 7 MG/DL (ref 6–20)
BUN/CREAT SERPL: 7.1 (ref 7–25)
CALCIUM SPEC-SCNC: 9 MG/DL (ref 8.6–10.5)
CHLORIDE SERPL-SCNC: 102 MMOL/L (ref 98–107)
CO2 SERPL-SCNC: 24 MMOL/L (ref 22–29)
CREAT SERPL-MCNC: 0.99 MG/DL (ref 0.57–1)
DEPRECATED RDW RBC AUTO: 43.7 FL (ref 37–54)
EOSINOPHIL # BLD AUTO: 0.04 10*3/MM3 (ref 0–0.4)
EOSINOPHIL NFR BLD AUTO: 0.4 % (ref 0.3–6.2)
ERYTHROCYTE [DISTWIDTH] IN BLOOD BY AUTOMATED COUNT: 12.7 % (ref 12.3–15.4)
GFR SERPL CREATININE-BSD FRML MDRD: 57 ML/MIN/1.73
GLUCOSE SERPL-MCNC: 104 MG/DL (ref 65–99)
HCT VFR BLD AUTO: 32.6 % (ref 34–46.6)
HGB BLD-MCNC: 10.4 G/DL (ref 12–15.9)
IMM GRANULOCYTES # BLD AUTO: 0.04 10*3/MM3 (ref 0–0.05)
IMM GRANULOCYTES NFR BLD AUTO: 0.4 % (ref 0–0.5)
LYMPHOCYTES # BLD AUTO: 2.18 10*3/MM3 (ref 0.7–3.1)
LYMPHOCYTES NFR BLD AUTO: 21.1 % (ref 19.6–45.3)
MCH RBC QN AUTO: 29.8 PG (ref 26.6–33)
MCHC RBC AUTO-ENTMCNC: 31.9 G/DL (ref 31.5–35.7)
MCV RBC AUTO: 93.4 FL (ref 79–97)
MONOCYTES # BLD AUTO: 1.01 10*3/MM3 (ref 0.1–0.9)
MONOCYTES NFR BLD AUTO: 9.8 % (ref 5–12)
NEUTROPHILS NFR BLD AUTO: 68.1 % (ref 42.7–76)
NEUTROPHILS NFR BLD AUTO: 7.05 10*3/MM3 (ref 1.7–7)
NRBC BLD AUTO-RTO: 0 /100 WBC (ref 0–0.2)
PLATELET # BLD AUTO: 292 10*3/MM3 (ref 140–450)
PMV BLD AUTO: 11.2 FL (ref 6–12)
POTASSIUM SERPL-SCNC: 3.6 MMOL/L (ref 3.5–5.2)
RBC # BLD AUTO: 3.49 10*6/MM3 (ref 3.77–5.28)
SODIUM SERPL-SCNC: 136 MMOL/L (ref 136–145)
WBC # BLD AUTO: 10.34 10*3/MM3 (ref 3.4–10.8)

## 2020-10-09 PROCEDURE — 25010000002 PIPERACILLIN SOD-TAZOBACTAM PER 1 G: Performed by: INTERNAL MEDICINE

## 2020-10-09 PROCEDURE — 25010000002 HYDROMORPHONE PER 4 MG: Performed by: INTERNAL MEDICINE

## 2020-10-09 PROCEDURE — 80048 BASIC METABOLIC PNL TOTAL CA: CPT | Performed by: INTERNAL MEDICINE

## 2020-10-09 PROCEDURE — 85025 COMPLETE CBC W/AUTO DIFF WBC: CPT | Performed by: INTERNAL MEDICINE

## 2020-10-09 PROCEDURE — 25010000002 ENOXAPARIN PER 10 MG: Performed by: INTERNAL MEDICINE

## 2020-10-09 PROCEDURE — 74177 CT ABD & PELVIS W/CONTRAST: CPT

## 2020-10-09 PROCEDURE — 99232 SBSQ HOSP IP/OBS MODERATE 35: CPT | Performed by: INTERNAL MEDICINE

## 2020-10-09 PROCEDURE — 25010000002 IOPAMIDOL 61 % SOLUTION: Performed by: INTERNAL MEDICINE

## 2020-10-09 RX ADMIN — SIMETHICONE 80 MG: 80 TABLET, CHEWABLE ORAL at 17:56

## 2020-10-09 RX ADMIN — IOPAMIDOL 85 ML: 612 INJECTION, SOLUTION INTRAVENOUS at 17:45

## 2020-10-09 RX ADMIN — TAZOBACTAM SODIUM AND PIPERACILLIN SODIUM 3.38 G: 375; 3 INJECTION, SOLUTION INTRAVENOUS at 17:50

## 2020-10-09 RX ADMIN — SIMETHICONE 80 MG: 80 TABLET, CHEWABLE ORAL at 11:17

## 2020-10-09 RX ADMIN — SODIUM CHLORIDE, PRESERVATIVE FREE 10 ML: 5 INJECTION INTRAVENOUS at 21:10

## 2020-10-09 RX ADMIN — SODIUM CHLORIDE, PRESERVATIVE FREE 10 ML: 5 INJECTION INTRAVENOUS at 08:23

## 2020-10-09 RX ADMIN — LOSARTAN POTASSIUM 50 MG: 50 TABLET, FILM COATED ORAL at 08:19

## 2020-10-09 RX ADMIN — TAZOBACTAM SODIUM AND PIPERACILLIN SODIUM 3.38 G: 375; 3 INJECTION, SOLUTION INTRAVENOUS at 10:06

## 2020-10-09 RX ADMIN — Medication 1 CAPSULE: at 08:19

## 2020-10-09 RX ADMIN — HYDROMORPHONE HYDROCHLORIDE 0.5 MG: 1 INJECTION, SOLUTION INTRAMUSCULAR; INTRAVENOUS; SUBCUTANEOUS at 10:37

## 2020-10-09 RX ADMIN — TAZOBACTAM SODIUM AND PIPERACILLIN SODIUM 3.38 G: 375; 3 INJECTION, SOLUTION INTRAVENOUS at 01:31

## 2020-10-09 RX ADMIN — HYDROMORPHONE HYDROCHLORIDE 0.5 MG: 1 INJECTION, SOLUTION INTRAMUSCULAR; INTRAVENOUS; SUBCUTANEOUS at 13:32

## 2020-10-09 RX ADMIN — OXYCODONE HYDROCHLORIDE AND ACETAMINOPHEN 1 TABLET: 7.5; 325 TABLET ORAL at 20:29

## 2020-10-09 RX ADMIN — CITALOPRAM 40 MG: 40 TABLET, FILM COATED ORAL at 08:19

## 2020-10-09 RX ADMIN — HYDROMORPHONE HYDROCHLORIDE 0.5 MG: 1 INJECTION, SOLUTION INTRAMUSCULAR; INTRAVENOUS; SUBCUTANEOUS at 08:33

## 2020-10-09 RX ADMIN — DOCUSATE SODIUM 100 MG: 100 CAPSULE ORAL at 08:19

## 2020-10-09 RX ADMIN — DOCUSATE SODIUM 100 MG: 100 CAPSULE ORAL at 21:10

## 2020-10-09 RX ADMIN — HYDROMORPHONE HYDROCHLORIDE 0.5 MG: 1 INJECTION, SOLUTION INTRAMUSCULAR; INTRAVENOUS; SUBCUTANEOUS at 17:56

## 2020-10-09 RX ADMIN — ENOXAPARIN SODIUM 40 MG: 40 INJECTION SUBCUTANEOUS at 21:10

## 2020-10-09 NOTE — PROGRESS NOTES
"[facility]    INPATIENT PROGRESS NOTE  10/9/2020      PATIENT NAME: Daja Hubbard  :  1960  MRN:  [id number]  Date of Admission:  10/6/2020      Antimicrobials:  Anti-Infectives (From admission, onward)    Ordered     Dose/Rate Route Frequency Start Stop    10/06/20 1430  piperacillin-tazobactam (ZOSYN) 3.375 g in iso-osmotic dextrose 50 ml (premix)     Shiva Weller, PharmD reviewed the order on 10/09/20 0722.   Ordering Provider: Peng Sebastian MD    3.375 g  over 4 Hours Intravenous Every 8 Hours 10/06/20 1800 10/13/20 1759    10/06/20 1051  piperacillin-tazobactam (ZOSYN) 3.375 g in iso-osmotic dextrose 50 ml (premix)     Ordering Provider: Sanford Palmer PA-C    3.375 g  over 30 Minutes Intravenous Once 10/06/20 1053 10/06/20 1150          MAR reviewed.      Chief Complaint   Patient presents with   • Abdominal Pain       Reason for consultation: Recurrent diverticulitis    Interval history:  Febrile overnight.  Still having a fair amount of abd pain.  C/o mild bloating.  Tolerated some solid food this am.      ROS:  As above.  No new rashes.  No SOB or chest pain.  Denies side effects from antimicrobials.      Objective:  Temp (24hrs), Av.8 °F (37.1 °C), Min:98 °F (36.7 °C), Max:101.2 °F (38.4 °C)    /75 (BP Location: Right arm, Patient Position: Lying)   Pulse 77   Temp 98.2 °F (36.8 °C) (Oral)   Resp 20   Ht 162.6 cm (64\")   Wt 83.2 kg (183 lb 6.4 oz)   SpO2 98%   BMI 31.48 kg/m²     Physical Examination:  GENERAL: Awake and alert, in no acute distress.   LINES: Peripheral IV intact  HEENT: EOMI. No conjunctival injection or subconjunctival hemorrhage. No icterus.  CV: RRR. No murmur, rubs, gallops.  Normal S1S2.  LUNGS: Clear to auscultation bilaterally without wheezing, rales, rhonchi. Normal respiratory effort.  ABDOMEN: Soft, diffuse abdominal tenderness to moderate palpation, mildly distended. Positive bowel sounds. No rebound or guarding. "    EXT:  No clubbing, cyanosis, or edema.  MSK: No joint effusions or inflammation noted.  SKIN: Warm and dry without rash or ulcer.  NEURO: A&Ox4. No focal deficits.  Face symmetric.  Speech fluent.  Moves all extremities well.   PSYCHIATRIC: Normal insight and judgement.  Cooperative.  Normal affect.       Laboratory Data:    Results from last 7 days   Lab Units 10/09/20  0352 10/08/20  0300 10/07/20  0144   WBC 10*3/mm3 10.34 10.38 16.83*   HEMOGLOBIN g/dL 10.4* 9.4* 10.8*   HEMATOCRIT % 32.6* 29.0* 33.6*   PLATELETS 10*3/mm3 292 221 225     Results from last 7 days   Lab Units 10/09/20  0352   SODIUM mmol/L 136   POTASSIUM mmol/L 3.6   CHLORIDE mmol/L 102   CO2 mmol/L 24.0   BUN mg/dL 7   CREATININE mg/dL 0.99   GLUCOSE mg/dL 104*   CALCIUM mg/dL 9.0     Results from last 7 days   Lab Units 10/06/20  0958   ALK PHOS U/L 93   BILIRUBIN mg/dL 0.6   ALT (SGPT) U/L 7   AST (SGOT) U/L 11             Estimated Creatinine Clearance: 63.8 mL/min (by C-G formula based on SCr of 0.99 mg/dL).  Results from last 7 days   Lab Units 10/06/20  0958   LACTATE mmol/L 1.1                   Microbiology:  Microbiology Results (last 10 days)     Procedure Component Value - Date/Time    COVID PRE-OP / PRE-PROCEDURE SCREENING ORDER (NO ISOLATION) - Swab, Nasopharynx [[id number]] Collected: 10/06/20 1435    Lab Status: Final result Specimen: Swab from Nasopharynx Updated: 10/08/20 1535    Narrative:      The following orders were created for panel order COVID PRE-OP / PRE-PROCEDURE SCREENING ORDER (NO ISOLATION) - Swab, Nasopharynx.  Procedure                               Abnormality         Status                     ---------                               -----------         ------                     COVID-19,[facility], NP ...[[id number]]                      Final result                 Please view results for these tests on the individual orders.    COVID-19,[facility], NP SWAB IN [facility] VIRAL TRANSPORT MEDIA 24-30 HR TAT - Swab,  Nasopharynx [[id number]] Collected: 10/06/20 1435    Lab Status: Final result Specimen: Swab from Nasopharynx Updated: 10/08/20 1535     SARS-CoV-2 WILLIAN Not Detected    Blood Culture - Blood, Arm, Left [[id number]] Collected: 10/06/20 1113    Lab Status: Preliminary result Specimen: Blood from Arm, Left Updated: 10/09/20 1130     Blood Culture No growth at 3 days    Blood Culture - Blood, Arm, Left [[id number]] Collected: 10/06/20 1113    Lab Status: Preliminary result Specimen: Blood from Arm, Left Updated: 10/09/20 1130     Blood Culture No growth at 3 days    Urine Culture - Urine, Urine, Clean Catch [[id number]]  (Normal) Collected: 10/06/20 0958    Lab Status: Final result Specimen: Urine, Clean Catch Updated: 10/07/20 2148     Urine Culture No growth            Radiology:  No radiology results for the last day      DISCUSSION:  60 y.o. female with history of diverticulitis who is admitted with several days of vomiting and lower abdominal pain.   Found again to have markedly inflamed mid sigmoid colon but without evidence of perforation or abscess.  She did have a significant leukocytosis.    Continues to have significant pain and fever though leukocytosis improved with antibiotics.    PROBLEM LIST:   --Recurrent diverticulitis, uncomplicated.  Initial CT with marked inflammation of sigmoid colon without perforation or abscess.  Continues to have significant abd pain and spiking fever on Zosyn.  Concern for development of complication such as perforation or abscess.  --Leukocytosis, due to the above.  Improved.  --Transient mild hypoxia, probably due to poor respiratory effort with abdominal pain.  Resolved.  Doing incentive spirometer.    PLAN:  --Repeat CT abd/pelvis with contrast to reassess for complication such as perforation or abscess, given fevers and ongoing pain  --Continue IV Zosyn  --Advised patient she probably will need a course of outpatient IV abx given her slow course.  She cannot come to my office  daily due to transportation issues.  She would need PICC and arrange home infusion - she is agreeable to that if needed.  --Depending on CT findings, may need surgery earlier rather than later vs percutaneous drain if fluid collection.  Dr. Barney following.    Plan discussed with patient.  Discussed with Dr. Barney.    Osmar Arias MD  10/9/2020  12:19 EDT

## 2020-10-09 NOTE — PROGRESS NOTES
[facility]  PROGRESS NOTE    Patient Name: Daja Hubbard  : 1960  MRN: [id number]    Date of Admission: 10/6/2020  Primary Care Physician: Sp Marie MD    Subjective   Subjective     CC:  abd pain     HPI:  Fever overnight. Abd pain improved but still present. Tolerating PO intake. + BM     Review of Systems  Gen- No fevers, chills  CV- No chest pain, palpitations  Resp- No cough, dyspnea  GI- No N/V/D, + abd pain       Objective   Objective     Vital Signs:   Temp:  [98 °F (36.7 °C)-101.2 °F (38.4 °C)] 98.2 °F (36.8 °C)  Heart Rate:  [65-89] 77  Resp:  [16-20] 20  BP: (110-139)/(68-79) 128/75        Physical Exam:  Constitutional: No acute distress, awake, alert  HENT: NCAT, mucous membranes moist  Respiratory: Clear to auscultation bilaterally, respiratory effort normal   Cardiovascular: RRR, no murmurs, rubs, or gallops, palpable pedal pulses bilaterally  Gastrointestinal: Positive bowel sounds, soft, nontender, nondistended  Musculoskeletal: No bilateral ankle edema  Psychiatric: Appropriate affect, cooperative  Neurologic: Oriented x 3, strength symmetric in all extremities, Cranial Nerves grossly intact to confrontation, speech clear  Skin: No rashes    Results Reviewed:  Results from last 7 days   Lab Units 10/09/20  0352 10/08/20  0300 10/07/20  0144 10/06/20  0958   WBC 10*3/mm3 10.34 10.38 16.83* 17.71*   HEMOGLOBIN g/dL 10.4* 9.4* 10.8* 11.9*   HEMATOCRIT % 32.6* 29.0* 33.6* 36.5   PLATELETS 10*3/mm3 292 221 225 235   PROCALCITONIN ng/mL  --   --   --  0.81*     Results from last 7 days   Lab Units 10/09/20  0352 10/08/20  0300 10/06/20  0958   SODIUM mmol/L 136 134* 130*   POTASSIUM mmol/L 3.6 3.9 3.9   CHLORIDE mmol/L 102 103 97*   CO2 mmol/L 24.0 21.0* 23.0   BUN mg/dL 7 7 17   CREATININE mg/dL 0.99 0.90 1.15*   GLUCOSE mg/dL 104* 103* 126*   CALCIUM mg/dL 9.0 8.4* 8.9   ALT (SGPT) U/L  --   --  7   AST (SGOT) U/L  --   --  11     Estimated  Creatinine Clearance: 63.8 mL/min (by C-G formula based on SCr of 0.99 mg/dL).    Microbiology Results Abnormal     Procedure Component Value - Date/Time    Blood Culture - Blood, Arm, Left [[id number]] Collected: 10/06/20 1113    Lab Status: Preliminary result Specimen: Blood from Arm, Left Updated: 10/09/20 1130     Blood Culture No growth at 3 days    Blood Culture - Blood, Arm, Left [[id number]] Collected: 10/06/20 1113    Lab Status: Preliminary result Specimen: Blood from Arm, Left Updated: 10/09/20 1130     Blood Culture No growth at 3 days    COVID PRE-OP / PRE-PROCEDURE SCREENING ORDER (NO ISOLATION) - Swab, Nasopharynx [[id number]] Collected: 10/06/20 1435    Lab Status: Final result Specimen: Swab from Nasopharynx Updated: 10/08/20 1535    Narrative:      The following orders were created for panel order COVID PRE-OP / PRE-PROCEDURE SCREENING ORDER (NO ISOLATION) - Swab, Nasopharynx.  Procedure                               Abnormality         Status                     ---------                               -----------         ------                     COVID-19,[facility], NP ...[[id number]]                      Final result                 Please view results for these tests on the individual orders.    COVID-19,[facility], NP SWAB IN LEXAR VIRAL TRANSPORT MEDIA 24-30 HR TAT - Swab, Nasopharynx [[id number]] Collected: 10/06/20 1435    Lab Status: Final result Specimen: Swab from Nasopharynx Updated: 10/08/20 1535     SARS-CoV-2 WILLIAN Not Detected    Urine Culture - Urine, Urine, Clean Catch [[id number]]  (Normal) Collected: 10/06/20 0958    Lab Status: Final result Specimen: Urine, Clean Catch Updated: 10/07/20 2148     Urine Culture No growth          Imaging Results (Last 24 Hours)     ** No results found for the last 24 hours. **               I have reviewed the medications:  Scheduled Meds:citalopram, 40 mg, Oral, Daily  docusate sodium, 100 mg, Oral, BID  enoxaparin, 40 mg, Subcutaneous,  Q24H  lactobacillus acidophilus, 1 capsule, Oral, Daily  losartan, 50 mg, Oral, Q24H  piperacillin-tazobactam, 3.375 g, Intravenous, Q8H  sodium chloride, 10 mL, Intravenous, Q12H      Continuous Infusions:   PRN Meds:.•  acetaminophen  •  cyclobenzaprine  •  HYDROmorphone  •  oxyCODONE-acetaminophen  •  simethicone  •  sodium chloride  •  sodium chloride    Assessment/Plan   Assessment & Plan     Active Hospital Problems    Diagnosis  POA   • **SIRS (systemic inflammatory response syndrome) (CMS/HCC) [R65.10]  Yes   • Hyponatremia [E87.1]  Yes   • Sigmoid diverticulitis [K57.32]  Yes   • Essential hypertension [I10]  Yes      Resolved Hospital Problems   No resolved problems to display.        Brief Hospital Course to date:  Patient is a 60-year-old female  who presents with episodes of recurrent diverticulitis and severe left lower quadrant abdominal pain.       SIRS  Diverticulitis   - ID and CRS consulted  - imaging reviewed - no evidence of perforation; repeating imaging 10/9 for fever and ongoing pain   - Continue zosyn -- may need PICC and IV antibiotics on discharge   - PRN pain control; tolerating PO intake      Hyponatremia - improved      Constipation   - docusate; probiotic      HTN   - BP improved as well as renal function  - Resume losartan      DVT prophylaxis:  lovenox        Disposition: I expect the patient to be discharged home in 1-3 days pending improvement/antibiotic plan   CODE STATUS:   Code Status and Medical Interventions:   Ordered at: 10/06/20 1421     Level Of Support Discussed With:    Patient     Code Status:    CPR     Medical Interventions (Level of Support Prior to Arrest):    Full       Vangie Coppola DO  10/09/20

## 2020-10-09 NOTE — PLAN OF CARE
Patient slept most of the shift, no complaints of pain. Around 2330 patient's temperature was 101.2, provider notified, tylenol given. Next temp was 99.1 and then 98. IV zosyn continued. Patient is hopeful for discharge today.

## 2020-10-09 NOTE — PLAN OF CARE
"Goal Outcome Evaluation:  Plan of Care Reviewed With: patient  Progress: improving  Outcome Summary: Pt VSS on RA, NSR/ SB throughout shift. Pt had multiple complaints of \"gas pain\" in her abdomen. Pt requested Dilaudid for pain and Mylicon for gas. Pt had CT completed today with contrast. Pt is resting comfortably at this time with no complaints of pain.  "

## 2020-10-09 NOTE — PROGRESS NOTES
Continued Stay Note  [facility]     Patient Name: Daja Hubbard  MRN: [id number]  Today's Date: 10/9/2020    Admit Date: 10/6/2020    Discharge Plan     Row Name 10/09/20 1536       Plan    Plan  Home at DC    Patient/Family in Agreement with Plan  yes    Plan Comments  I spoke with Renee at the [facility] office. They can not provide home infusion for the pts [facility] plan at DC. Would need to use [facility] with outpt picc care or HH assist verses going outpt to the [facility]. I spoke with the pt. She states she could learn to do home infusion but was not wanting to discuss the options at this time as she is uncomfortable and doubts DC will occur this weekend. CM will follow..    Final Discharge Disposition Code  01 - home or self-care        Discharge Codes    No documentation.       Expected Discharge Date and Time     Expected Discharge Date Expected Discharge Time    Oct 9, 2020             Coleen Saldana RN

## 2020-10-09 NOTE — PROGRESS NOTES
Without leukocytosis fever or tachycardia, but, more pain than expected.  Discussed with Dr Arias earlier.  Being scanned.  Will change CT to stat to facilitate results.

## 2020-10-10 LAB
ANION GAP SERPL CALCULATED.3IONS-SCNC: 9 MMOL/L (ref 5–15)
BASOPHILS # BLD AUTO: 0.02 10*3/MM3 (ref 0–0.2)
BASOPHILS NFR BLD AUTO: 0.3 % (ref 0–1.5)
BUN SERPL-MCNC: 7 MG/DL (ref 6–20)
BUN/CREAT SERPL: 8.1 (ref 7–25)
CALCIUM SPEC-SCNC: 8.7 MG/DL (ref 8.6–10.5)
CHLORIDE SERPL-SCNC: 102 MMOL/L (ref 98–107)
CO2 SERPL-SCNC: 25 MMOL/L (ref 22–29)
CREAT SERPL-MCNC: 0.86 MG/DL (ref 0.57–1)
DEPRECATED RDW RBC AUTO: 42.8 FL (ref 37–54)
EOSINOPHIL # BLD AUTO: 0.09 10*3/MM3 (ref 0–0.4)
EOSINOPHIL NFR BLD AUTO: 1.5 % (ref 0.3–6.2)
ERYTHROCYTE [DISTWIDTH] IN BLOOD BY AUTOMATED COUNT: 12.8 % (ref 12.3–15.4)
FERRITIN SERPL-MCNC: 230 NG/ML (ref 13–150)
GFR SERPL CREATININE-BSD FRML MDRD: 68 ML/MIN/1.73
GLUCOSE SERPL-MCNC: 97 MG/DL (ref 65–99)
HCT VFR BLD AUTO: 27.3 % (ref 34–46.6)
HGB BLD-MCNC: 8.9 G/DL (ref 12–15.9)
IMM GRANULOCYTES # BLD AUTO: 0.02 10*3/MM3 (ref 0–0.05)
IMM GRANULOCYTES NFR BLD AUTO: 0.3 % (ref 0–0.5)
IRON 24H UR-MRATE: 34 MCG/DL (ref 37–145)
IRON SATN MFR SERPL: 16 % (ref 20–50)
LYMPHOCYTES # BLD AUTO: 2.25 10*3/MM3 (ref 0.7–3.1)
LYMPHOCYTES NFR BLD AUTO: 37.3 % (ref 19.6–45.3)
MCH RBC QN AUTO: 29.6 PG (ref 26.6–33)
MCHC RBC AUTO-ENTMCNC: 32.6 G/DL (ref 31.5–35.7)
MCV RBC AUTO: 90.7 FL (ref 79–97)
MONOCYTES # BLD AUTO: 0.75 10*3/MM3 (ref 0.1–0.9)
MONOCYTES NFR BLD AUTO: 12.4 % (ref 5–12)
NEUTROPHILS NFR BLD AUTO: 2.91 10*3/MM3 (ref 1.7–7)
NEUTROPHILS NFR BLD AUTO: 48.2 % (ref 42.7–76)
NRBC BLD AUTO-RTO: 0 /100 WBC (ref 0–0.2)
PLAT MORPH BLD: NORMAL
PLATELET # BLD AUTO: 267 10*3/MM3 (ref 140–450)
PMV BLD AUTO: 11 FL (ref 6–12)
POTASSIUM SERPL-SCNC: 3.8 MMOL/L (ref 3.5–5.2)
RBC # BLD AUTO: 3.01 10*6/MM3 (ref 3.77–5.28)
RBC MORPH BLD: NORMAL
RETICS # AUTO: 0.04 10*6/MM3 (ref 0.02–0.13)
RETICS/RBC NFR AUTO: 1.26 % (ref 0.7–1.9)
SODIUM SERPL-SCNC: 136 MMOL/L (ref 136–145)
TIBC SERPL-MCNC: 207 MCG/DL (ref 298–536)
TRANSFERRIN SERPL-MCNC: 139 MG/DL (ref 200–360)
WBC # BLD AUTO: 6.04 10*3/MM3 (ref 3.4–10.8)
WBC MORPH BLD: NORMAL

## 2020-10-10 PROCEDURE — 94799 UNLISTED PULMONARY SVC/PX: CPT

## 2020-10-10 PROCEDURE — 85007 BL SMEAR W/DIFF WBC COUNT: CPT | Performed by: INTERNAL MEDICINE

## 2020-10-10 PROCEDURE — 99232 SBSQ HOSP IP/OBS MODERATE 35: CPT | Performed by: INTERNAL MEDICINE

## 2020-10-10 PROCEDURE — 80048 BASIC METABOLIC PNL TOTAL CA: CPT | Performed by: INTERNAL MEDICINE

## 2020-10-10 PROCEDURE — 85045 AUTOMATED RETICULOCYTE COUNT: CPT | Performed by: INTERNAL MEDICINE

## 2020-10-10 PROCEDURE — 25010000002 ENOXAPARIN PER 10 MG: Performed by: INTERNAL MEDICINE

## 2020-10-10 PROCEDURE — 84466 ASSAY OF TRANSFERRIN: CPT | Performed by: INTERNAL MEDICINE

## 2020-10-10 PROCEDURE — 83540 ASSAY OF IRON: CPT | Performed by: INTERNAL MEDICINE

## 2020-10-10 PROCEDURE — 82728 ASSAY OF FERRITIN: CPT | Performed by: INTERNAL MEDICINE

## 2020-10-10 PROCEDURE — 85025 COMPLETE CBC W/AUTO DIFF WBC: CPT | Performed by: INTERNAL MEDICINE

## 2020-10-10 PROCEDURE — 25010000002 PIPERACILLIN SOD-TAZOBACTAM PER 1 G: Performed by: INTERNAL MEDICINE

## 2020-10-10 RX ORDER — FERROUS SULFATE 325(65) MG
325 TABLET ORAL
Status: DISCONTINUED | OUTPATIENT
Start: 2020-10-11 | End: 2020-10-12 | Stop reason: HOSPADM

## 2020-10-10 RX ADMIN — SODIUM CHLORIDE, PRESERVATIVE FREE 10 ML: 5 INJECTION INTRAVENOUS at 09:20

## 2020-10-10 RX ADMIN — SIMETHICONE 80 MG: 80 TABLET, CHEWABLE ORAL at 16:44

## 2020-10-10 RX ADMIN — TAZOBACTAM SODIUM AND PIPERACILLIN SODIUM 3.38 G: 375; 3 INJECTION, SOLUTION INTRAVENOUS at 18:00

## 2020-10-10 RX ADMIN — ENOXAPARIN SODIUM 40 MG: 40 INJECTION SUBCUTANEOUS at 20:54

## 2020-10-10 RX ADMIN — CITALOPRAM 40 MG: 40 TABLET, FILM COATED ORAL at 09:20

## 2020-10-10 RX ADMIN — DOCUSATE SODIUM 100 MG: 100 CAPSULE ORAL at 20:53

## 2020-10-10 RX ADMIN — OXYCODONE HYDROCHLORIDE AND ACETAMINOPHEN 1 TABLET: 7.5; 325 TABLET ORAL at 06:29

## 2020-10-10 RX ADMIN — OXYCODONE HYDROCHLORIDE AND ACETAMINOPHEN 1 TABLET: 7.5; 325 TABLET ORAL at 16:44

## 2020-10-10 RX ADMIN — OXYCODONE HYDROCHLORIDE AND ACETAMINOPHEN 1 TABLET: 7.5; 325 TABLET ORAL at 20:54

## 2020-10-10 RX ADMIN — DOCUSATE SODIUM 100 MG: 100 CAPSULE ORAL at 09:20

## 2020-10-10 RX ADMIN — Medication 1 CAPSULE: at 09:20

## 2020-10-10 RX ADMIN — TAZOBACTAM SODIUM AND PIPERACILLIN SODIUM 3.38 G: 375; 3 INJECTION, SOLUTION INTRAVENOUS at 10:44

## 2020-10-10 RX ADMIN — SIMETHICONE 80 MG: 80 TABLET, CHEWABLE ORAL at 09:20

## 2020-10-10 RX ADMIN — TAZOBACTAM SODIUM AND PIPERACILLIN SODIUM 3.38 G: 375; 3 INJECTION, SOLUTION INTRAVENOUS at 02:16

## 2020-10-10 RX ADMIN — LOSARTAN POTASSIUM 50 MG: 50 TABLET, FILM COATED ORAL at 09:20

## 2020-10-10 RX ADMIN — OXYCODONE HYDROCHLORIDE AND ACETAMINOPHEN 1 TABLET: 7.5; 325 TABLET ORAL at 10:43

## 2020-10-10 NOTE — PROGRESS NOTES
[facility] Medicine Services  PROGRESS NOTE    Patient Name: Daja Hubbard  : 1960  MRN: [id number]    Date of Admission: 10/6/2020  Primary Care Physician: Sp Marie MD    Subjective   Subjective     CC:  abd pain, diverticulitis     HPI:  States she had more pain in her abd after having a BM. Still with gas pains. Tolerating PO.     Review of Systems  Gen- No fevers, chills  CV- No chest pain, palpitations  Resp- No cough, dyspnea  GI- No N/V/D, + abd pain     Objective   Objective     Vital Signs:   Temp:  [97.8 °F (36.6 °C)-98.4 °F (36.9 °C)] 98 °F (36.7 °C)  Heart Rate:  [53-72] 53  Resp:  [20] 20  BP: (107-124)/(56-77) 124/64        Physical Exam:  Constitutional: No acute distress, awake, alert  HENT: NCAT, mucous membranes moist  Respiratory: Clear to auscultation bilaterally, respiratory effort normal   Cardiovascular: RRR, no murmurs  Gastrointestinal: Positive bowel sounds, soft, tender LLQ  Musculoskeletal: No bilateral ankle edema  Psychiatric: Appropriate affect, cooperative  Neurologic: Oriented x 3, strength symmetric in all extremities, Cranial Nerves grossly intact to confrontation, speech clear  Skin: No rashes    Results Reviewed:  Results from last 7 days   Lab Units 10/10/20  0318 10/09/20  0352 10/08/20  0300  10/06/20  09   WBC 10*3/mm3 6.04 10.34 10.38   < > 17.71*   HEMOGLOBIN g/dL 8.9* 10.4* 9.4*   < > 11.9*   HEMATOCRIT % 27.3* 32.6* 29.0*   < > 36.5   PLATELETS 10*3/mm3 267 292 221   < > 235   PROCALCITONIN ng/mL  --   --   --   --  0.81*    < > = values in this interval not displayed.     Results from last 7 days   Lab Units 10/10/20  0318 10/09/20  0352 10/08/20  0300 10/06/20  0958   SODIUM mmol/L 136 136 134* 130*   POTASSIUM mmol/L 3.8 3.6 3.9 3.9   CHLORIDE mmol/L 102 102 103 97*   CO2 mmol/L 25.0 24.0 21.0* 23.0   BUN mg/dL 7 7 7 17   CREATININE mg/dL 0.86 0.99 0.90 1.15*   GLUCOSE mg/dL 97 104* 103* 126*   CALCIUM mg/dL 8.7 9.0 8.4*  8.9   ALT (SGPT) U/L  --   --   --  7   AST (SGOT) U/L  --   --   --  11     Estimated Creatinine Clearance: 73.5 mL/min (by C-G formula based on SCr of 0.86 mg/dL).    Microbiology Results Abnormal     Procedure Component Value - Date/Time    Blood Culture - Blood, Arm, Left [[id number]] Collected: 10/06/20 1113    Lab Status: Preliminary result Specimen: Blood from Arm, Left Updated: 10/10/20 1130     Blood Culture No growth at 4 days    Blood Culture - Blood, Arm, Left [[id number]] Collected: 10/06/20 1113    Lab Status: Preliminary result Specimen: Blood from Arm, Left Updated: 10/10/20 1130     Blood Culture No growth at 4 days    COVID PRE-OP / PRE-PROCEDURE SCREENING ORDER (NO ISOLATION) - Swab, Nasopharynx [[id number]] Collected: 10/06/20 1435    Lab Status: Final result Specimen: Swab from Nasopharynx Updated: 10/08/20 1535    Narrative:      The following orders were created for panel order COVID PRE-OP / PRE-PROCEDURE SCREENING ORDER (NO ISOLATION) - Swab, Nasopharynx.  Procedure                               Abnormality         Status                     ---------                               -----------         ------                     COVID-19,[facility], NP ...[[id number]]                      Final result                 Please view results for these tests on the individual orders.    COVID-19,[facility], NP SWAB IN LEXAR VIRAL TRANSPORT MEDIA 24-30 HR TAT - Swab, Nasopharynx [[id number]] Collected: 10/06/20 1435    Lab Status: Final result Specimen: Swab from Nasopharynx Updated: 10/08/20 1535     SARS-CoV-2 WILLIAN Not Detected    Urine Culture - Urine, Urine, Clean Catch [[id number]]  (Normal) Collected: 10/06/20 0958    Lab Status: Final result Specimen: Urine, Clean Catch Updated: 10/07/20 2148     Urine Culture No growth          Imaging Results (Last 24 Hours)     Procedure Component Value Units Date/Time    CT Abdomen Pelvis With Contrast [[id number]] Collected: 10/09/20 1709     Updated: 10/10/20  1214    Narrative:      EXAMINATION: CT ABDOMEN/PELVIS W CONTRAST - 10/09/2020     INDICATION:  A41.9-Sepsis, unspecified organism; K57.92-Diverticulitis  of intestine, part unspecified, without perforation or abscess without  bleeding; N39.0-Urinary tract infection, site not specified; N17.9-Acute  kidney failure, unspecified.      TECHNIQUE: CT abdomen and pelvis without intravenous contrast.     The radiation dose reduction device was turned on for each scan per the  ALARA (As Low as Reasonably Achievable) protocol.     COMPARISON: CT dated 10/06/2020.     FINDINGS: Lung bases are grossly clear. Liver without focal lesion  measuring mild hepatic steatosis. Gallbladder appears at least partially  distended without contrast. No gross biliary abnormalities. Pancreas and  spleen unremarkable. Adrenals without distinct nodule. Kidneys without  hydronephrosis or hydroureter. No bulky retroperitoneal adenopathy.  Atherosclerotic nonaneurysmal abdominal aorta. GI tract evaluation with  small hiatal hernia. No mechanical obstructive process with inflammatory  findings of the redundant sigmoid colon in the lower midabdomen where  there is diverticular disease consistent with acute diverticulitis with  pericolonic stranding but no evidence for perforation or abscess.  Overall inflammatory process is mildly increased involvement from  10/06/2020 recent comparison. Pelvic viscera otherwise unremarkable  without bulky pelvic adenopathy or free fluid.       Impression:      Mildly increased inflammatory process of acute sigmoid  diverticulitis within the lower midabdomen however redundant sigmoid  colon without perforation or abscess.     DICTATED:   10/09/2020  EDITED/ls :   10/09/2020         This report was finalized on 10/10/2020 12:11 PM by Dr. Jude Negro.                  I have reviewed the medications:  Scheduled Meds:citalopram, 40 mg, Oral, Daily  docusate sodium, 100 mg, Oral, BID  enoxaparin, 40 mg, Subcutaneous,  Q24H  lactobacillus acidophilus, 1 capsule, Oral, Daily  losartan, 50 mg, Oral, Q24H  piperacillin-tazobactam, 3.375 g, Intravenous, Q8H  sodium chloride, 10 mL, Intravenous, Q12H      Continuous Infusions:   PRN Meds:.•  acetaminophen  •  cyclobenzaprine  •  HYDROmorphone  •  oxyCODONE-acetaminophen  •  simethicone  •  sodium chloride  •  sodium chloride    Assessment/Plan   Assessment & Plan     Active Hospital Problems    Diagnosis  POA   • **SIRS (systemic inflammatory response syndrome) (CMS/HCC) [R65.10]  Yes   • Hyponatremia [E87.1]  Yes   • Sigmoid diverticulitis [K57.32]  Yes   • Essential hypertension [I10]  Yes      Resolved Hospital Problems   No resolved problems to display.        Brief Hospital Course to date:  Patient is a 60-year-old female  who presents with episodes of recurrent diverticulitis and severe left lower quadrant abdominal pain.       SIRS  Diverticulitis   - ID and CRS consulted  - imaging reviewed - no evidence of perforation; repeating imaging 10/9 for fever and ongoing pain with increase inflammation however no abscess/perforation   - Continue zosyn -- may need PICC and IV antibiotics on discharge   - PRN pain control; tolerating PO intake        Constipation   - docusate; probiotic      HTN   - BP improved as well as renal function  - Resume losartan     Anemia   - Low iron and tsat   - Start supplement  - will need EGD and c-scope when appropriate     Hyponatremia - improved      DVT prophylaxis:  lovenox     Disposition: I expect the patient to be discharged home in 1-3 days pending improvement/antibiotic plan     CODE STATUS:   Code Status and Medical Interventions:   Ordered at: 10/06/20 1421     Level Of Support Discussed With:    Patient     Code Status:    CPR     Medical Interventions (Level of Support Prior to Arrest):    Full       Vangie Coppola DO  10/10/20

## 2020-10-10 NOTE — PLAN OF CARE
"Goal Outcome Evaluation:  Plan of Care Reviewed With: patient  Progress: improving     VSS on 2L NC while sleeping. \"Pt states, pain is so much better.\"  Spoke with Dr. Barney at 1930 to inform him of CT impression.   2112- Pt diaphoretic. Says, \"I think it happens when I take the pain meds.\"     Moved to PO pain meds during shift.     Pt has neuro stimulator for bladder urgency placed in August- incision on R gluteal.       Problem: Adult Inpatient Plan of Care  Goal: Plan of Care Review  Outcome: Ongoing, Progressing  Goal: Patient-Specific Goal (Individualized)  Outcome: Ongoing, Progressing  Goal: Absence of Hospital-Acquired Illness or Injury  Outcome: Ongoing, Progressing  Intervention: Identify and Manage Fall Risk  Recent Flowsheet Documentation  Taken 10/10/2020 0400 by Nurys Aponte RN  Safety Promotion/Fall Prevention:   assistive device/personal items within reach   clutter free environment maintained   fall prevention program maintained   nonskid shoes/slippers when out of bed   safety round/check completed   room organization consistent  Taken 10/10/2020 0217 by Nurys Aponte RN  Safety Promotion/Fall Prevention:   assistive device/personal items within reach   clutter free environment maintained   fall prevention program maintained   nonskid shoes/slippers when out of bed   room organization consistent   safety round/check completed  Taken 10/10/2020 0000 by Nurys Aponte RN  Safety Promotion/Fall Prevention:   activity supervised   clutter free environment maintained   fall prevention program maintained   nonskid shoes/slippers when out of bed   safety round/check completed   room organization consistent   assistive device/personal items within reach  Taken 10/9/2020 2200 by Nurys Aponte RN  Safety Promotion/Fall Prevention:   assistive device/personal items within reach   clutter free environment maintained   fall prevention program maintained  Taken 10/9/2020 2000 by Nurys Aponte RN  Safety " Promotion/Fall Prevention:   activity supervised   clutter free environment maintained   assistive device/personal items within reach   lighting adjusted   nonskid shoes/slippers when out of bed   safety round/check completed   room organization consistent  Intervention: Prevent Skin Injury  Recent Flowsheet Documentation  Taken 10/10/2020 0400 by Nurys Aponte RN  Body Position:   position changed independently   side-lying, right  Taken 10/10/2020 0217 by Nurys Aponte RN  Body Position: side-lying, left  Taken 10/10/2020 0000 by Nurys Aponte RN  Body Position: side-lying, left  Taken 10/9/2020 2200 by Nurys Aponte RN  Body Position:   position changed independently   supine  Taken 10/9/2020 2000 by Nurys Aponte RN  Body Position: position changed independently  Intervention: Prevent and Manage VTE (venous thromboembolism) Risk  Recent Flowsheet Documentation  Taken 10/9/2020 2000 by Nurys Aponte RN  VTE Prevention/Management: (lovenox)   bilateral   dorsiflexion/plantar flexion performed   other (see comments)  Goal: Optimal Comfort and Wellbeing  Outcome: Ongoing, Progressing  Intervention: Provide Person-Centered Care  Recent Flowsheet Documentation  Taken 10/9/2020 2000 by Nurys Aponte RN  Trust Relationship/Rapport:   care explained   questions encouraged   emotional support provided   empathic listening provided  Goal: Readiness for Transition of Care  Outcome: Ongoing, Progressing     Problem: Adjustment to Illness (Sepsis/Septic Shock)  Goal: Optimal Coping  Outcome: Ongoing, Progressing  Intervention: Optimize Psychosocial Adjustment to Illness  Recent Flowsheet Documentation  Taken 10/9/2020 2000 by Nurys Aponte RN  Family/Support System Care: family care conference arranged     Problem: Bleeding (Sepsis/Septic Shock)  Goal: Absence of Bleeding  Outcome: Ongoing, Progressing     Problem: Glycemic Control Impaired (Sepsis/Septic Shock)  Goal: Blood Glucose Level Within Desired  Range  Outcome: Ongoing, Progressing     Problem: Hemodynamic Instability (Sepsis/Septic Shock)  Goal: Effective Tissue Perfusion  Outcome: Ongoing, Progressing     Problem: Infection (Sepsis/Septic Shock)  Goal: Absence of Infection Signs and Symptoms  Outcome: Ongoing, Progressing     Problem: Nutrition Impaired (Sepsis/Septic Shock)  Goal: Optimal Nutrition Intake  Outcome: Ongoing, Progressing     Problem: Respiratory Compromise (Sepsis/Septic Shock)  Goal: Effective Oxygenation and Ventilation  Outcome: Ongoing, Progressing  Intervention: Promote Airway Secretion Clearance  Recent Flowsheet Documentation  Taken 10/9/2020 2000 by Nurys Aponte RN  Activity Management: sitting, edge of bed  Intervention: Optimize Oxygenation and Ventilation  Recent Flowsheet Documentation  Taken 10/10/2020 0400 by Nurys Aponte RN  Head of Bed (HOB): HOB lowered  Taken 10/10/2020 0217 by Nurys Aponte RN  Head of Bed (HOB): HOB lowered  Taken 10/10/2020 0000 by Nurys Aponte RN  Head of Bed (HOB): HOB lowered  Taken 10/9/2020 2200 by Nurys Aponte RN  Head of Bed (HOB): HOB elevated  Taken 10/9/2020 2000 by Nurys Aponte RN  Head of Bed (HOB):   HOB lowered   HOB at 15 degrees     Problem: UTI (Urinary Tract Infection)  Goal: Improved Infection Symptoms  Outcome: Ongoing, Progressing

## 2020-10-10 NOTE — PLAN OF CARE
Goal Outcome Evaluation:  Plan of Care Reviewed With: patient  Progress: improving  Outcome Summary: Patient doing well. Patient reports pain immediately after BM today that is similar to the previous pain she has had. VSS. IV abx's have continued.        Problem: Adult Inpatient Plan of Care  Goal: Plan of Care Review  Outcome: Ongoing, Progressing  Flowsheets (Taken 10/10/2020 1751)  Progress: improving  Plan of Care Reviewed With: patient  Outcome Summary: Patient doing well. Patient reports pain immediately after BM today that is similar to the previous pain she has had. VSS. IV abx's have continued.  Goal: Patient-Specific Goal (Individualized)  Outcome: Ongoing, Progressing  Goal: Absence of Hospital-Acquired Illness or Injury  Outcome: Ongoing, Progressing  Intervention: Identify and Manage Fall Risk  Recent Flowsheet Documentation  Taken 10/10/2020 1600 by Aleena Padilla RN  Safety Promotion/Fall Prevention:   activity supervised   safety round/check completed   nonskid shoes/slippers when out of bed  Taken 10/10/2020 1400 by Aleena Padilla RN  Safety Promotion/Fall Prevention:   safety round/check completed   nonskid shoes/slippers when out of bed  Taken 10/10/2020 1200 by Aleena Padilla RN  Safety Promotion/Fall Prevention:   activity supervised   safety round/check completed   nonskid shoes/slippers when out of bed  Taken 10/10/2020 1000 by Aleena Padilla RN  Safety Promotion/Fall Prevention:   safety round/check completed   nonskid shoes/slippers when out of bed  Taken 10/10/2020 0925 by Aleena Padilla RN  Safety Promotion/Fall Prevention:   activity supervised   nonskid shoes/slippers when out of bed  Intervention: Prevent Skin Injury  Recent Flowsheet Documentation  Taken 10/10/2020 1600 by Aleena Padilla RN  Body Position: position changed independently  Taken 10/10/2020 1000 by Aleena Padilla RN  Body Position: position changed independently  Taken 10/10/2020 0925 by  Aleena Padilla, RN  Body Position: position changed independently  Intervention: Prevent and Manage VTE (venous thromboembolism) Risk  Recent Flowsheet Documentation  Taken 10/10/2020 0925 by Aleena Padilla RN  VTE Prevention/Management:   bilateral   dorsiflexion/plantar flexion performed  Goal: Optimal Comfort and Wellbeing  Outcome: Ongoing, Progressing  Intervention: Provide Person-Centered Care  Recent Flowsheet Documentation  Taken 10/10/2020 1600 by Aleena Padilla RN  Trust Relationship/Rapport:   care explained   choices provided   emotional support provided   empathic listening provided   questions answered   questions encouraged   reassurance provided   thoughts/feelings acknowledged  Taken 10/10/2020 0925 by Aleena Padilla RN  Trust Relationship/Rapport:   care explained   choices provided   emotional support provided   empathic listening provided   questions answered   questions encouraged   reassurance provided   thoughts/feelings acknowledged  Goal: Readiness for Transition of Care  Outcome: Ongoing, Progressing     Problem: Adjustment to Illness (Sepsis/Septic Shock)  Goal: Optimal Coping  Outcome: Ongoing, Progressing     Problem: Bleeding (Sepsis/Septic Shock)  Goal: Absence of Bleeding  Outcome: Ongoing, Progressing     Problem: Glycemic Control Impaired (Sepsis/Septic Shock)  Goal: Blood Glucose Level Within Desired Range  Outcome: Ongoing, Progressing     Problem: Nutrition Impaired (Sepsis/Septic Shock)  Goal: Optimal Nutrition Intake  Outcome: Ongoing, Progressing     Problem: Respiratory Compromise (Sepsis/Septic Shock)  Goal: Effective Oxygenation and Ventilation  Outcome: Ongoing, Progressing  Intervention: Promote Airway Secretion Clearance  Recent Flowsheet Documentation  Taken 10/10/2020 1600 by Aleena Padilla RN  Activity Management: activity adjusted per tolerance  Cough And Deep Breathing: done independently per patient  Taken 10/10/2020 1400 by Aleena Padilla  RN  Activity Management: activity adjusted per tolerance  Taken 10/10/2020 1200 by Aleena Padilla RN  Activity Management: activity adjusted per tolerance  Taken 10/10/2020 1000 by Aleena Padilla RN  Activity Management: activity adjusted per tolerance  Taken 10/10/2020 0925 by Aleena Padilla RN  Activity Management: activity adjusted per tolerance  Intervention: Optimize Oxygenation and Ventilation  Recent Flowsheet Documentation  Taken 10/10/2020 1400 by Aleena Padilla RN  Airway/Ventilation Management: airway patency maintained  Taken 10/10/2020 1000 by Aleena Padilla RN  Airway/Ventilation Management: airway patency maintained  Taken 10/10/2020 0925 by Aleena Padilla RN  Airway/Ventilation Management: airway patency maintained     Problem: UTI (Urinary Tract Infection)  Goal: Improved Infection Symptoms  Outcome: Ongoing, Progressing

## 2020-10-10 NOTE — PROGRESS NOTES
Daja Hubbard     LOS: 4 days     Subjective   Patient has been feeling better although she did have some left lower quadrant pain after she moved her bowels this morning.  She is tolerating her diet.  She remains afebrile.  White blood cell count today is 6.04 with a hemoglobin of 8.9.  These are both down from yesterday.  CT scan shows continued mildly increased diverticulitis in the sigmoid colon.  She has had similar findings of varying degree on every CT scan she has had since late last year.    Objective     Vital Signs  Vitals:    10/10/20 1105   BP: 124/64   Pulse:    Resp: 20   Temp: 98 °F (36.7 °C)   SpO2: 100%       I/O  Intake & Output (last day)       10/09 0701 - 10/10 0700 10/10 0701 - 10/11 0700    P.O.      Total Intake(mL/kg)      Urine (mL/kg/hr) 1800 (0.9)     Total Output 1800     Net -1800                 Physical Exam:  Abdomen soft and I do not appreciate significant tenderness today.       Results Review:       WBC WBC   Date Value Ref Range Status   10/10/2020 6.04 3.40 - 10.80 10*3/mm3 Final   10/09/2020 10.34 3.40 - 10.80 10*3/mm3 Final   10/08/2020 10.38 3.40 - 10.80 10*3/mm3 Final      HGB Hemoglobin   Date Value Ref Range Status   10/10/2020 8.9 (L) 12.0 - 15.9 g/dL Final   10/09/2020 10.4 (L) 12.0 - 15.9 g/dL Final   10/08/2020 9.4 (L) 12.0 - 15.9 g/dL Final      HCT Hematocrit   Date Value Ref Range Status   10/10/2020 27.3 (L) 34.0 - 46.6 % Final   10/09/2020 32.6 (L) 34.0 - 46.6 % Final   10/08/2020 29.0 (L) 34.0 - 46.6 % Final      PLT        Results from last 7 days   Lab Units 10/10/20  0318   PLATELETS 10*3/mm3 267            Sodium Sodium   Date Value Ref Range Status   10/10/2020 136 136 - 145 mmol/L Final   10/09/2020 136 136 - 145 mmol/L Final   10/08/2020 134 (L) 136 - 145 mmol/L Final      Potassium Potassium   Date Value Ref Range Status   10/10/2020 3.8 3.5 - 5.2 mmol/L Final   10/09/2020 3.6 3.5 - 5.2 mmol/L Final   10/08/2020 3.9 3.5 - 5.2 mmol/L Final      Chloride  Chloride   Date Value Ref Range Status   10/10/2020 102 98 - 107 mmol/L Final   10/09/2020 102 98 - 107 mmol/L Final   10/08/2020 103 98 - 107 mmol/L Final      Bicarbonate No results found for: PLASMABICARB   BUN BUN   Date Value Ref Range Status   10/10/2020 7 6 - 20 mg/dL Final   10/09/2020 7 6 - 20 mg/dL Final   10/08/2020 7 6 - 20 mg/dL Final      Creatinine Creatinine   Date Value Ref Range Status   10/10/2020 0.86 0.57 - 1.00 mg/dL Final   10/09/2020 0.99 0.57 - 1.00 mg/dL Final   10/08/2020 0.90 0.57 - 1.00 mg/dL Final      Calcium Calcium   Date Value Ref Range Status   10/10/2020 8.7 8.6 - 10.5 mg/dL Final   10/09/2020 9.0 8.6 - 10.5 mg/dL Final   10/08/2020 8.4 (L) 8.6 - 10.5 mg/dL Final      Magnesium No results found for: MG         Imaging Results (Last 24 Hours)     Procedure Component Value Units Date/Time    CT Abdomen Pelvis With Contrast [[id number]] Collected: 10/09/20 1709     Updated: 10/10/20 1214    Narrative:      EXAMINATION: CT ABDOMEN/PELVIS W CONTRAST - 10/09/2020     INDICATION:  A41.9-Sepsis, unspecified organism; K57.92-Diverticulitis  of intestine, part unspecified, without perforation or abscess without  bleeding; N39.0-Urinary tract infection, site not specified; N17.9-Acute  kidney failure, unspecified.      TECHNIQUE: CT abdomen and pelvis without intravenous contrast.     The radiation dose reduction device was turned on for each scan per the  ALARA (As Low as Reasonably Achievable) protocol.     COMPARISON: CT dated 10/06/2020.     FINDINGS: Lung bases are grossly clear. Liver without focal lesion  measuring mild hepatic steatosis. Gallbladder appears at least partially  distended without contrast. No gross biliary abnormalities. Pancreas and  spleen unremarkable. Adrenals without distinct nodule. Kidneys without  hydronephrosis or hydroureter. No bulky retroperitoneal adenopathy.  Atherosclerotic nonaneurysmal abdominal aorta. GI tract evaluation with  small hiatal hernia. No  mechanical obstructive process with inflammatory  findings of the redundant sigmoid colon in the lower midabdomen where  there is diverticular disease consistent with acute diverticulitis with  pericolonic stranding but no evidence for perforation or abscess.  Overall inflammatory process is mildly increased involvement from  10/06/2020 recent comparison. Pelvic viscera otherwise unremarkable  without bulky pelvic adenopathy or free fluid.       Impression:      Mildly increased inflammatory process of acute sigmoid  diverticulitis within the lower midabdomen however redundant sigmoid  colon without perforation or abscess.     DICTATED:   10/09/2020  EDITED/ls :   10/09/2020         This report was finalized on 10/10/2020 12:11 PM by Dr. Jude Negro.             Assessment/Plan       SIRS (systemic inflammatory response syndrome) (CMS/HCC)    Sigmoid diverticulitis    Essential hypertension    Hyponatremia    Patient had some left lower quadrant pain with bowel movement this morning.  Her CT scan from yesterday showed slight increase in the inflammation in the sigmoid colon.  She is tolerating a diet and her abdominal exam is nontender.  Her white blood cell count is down to 6.  She is also anemic with a hemoglobin of 8.9.  Her iron studies are low.  I am not sure what to make of the CBC today since she is a significant drop in white count and her hemoglobin.  Would like to keep her 1 more day and repeat her CBC tomorrow.  It may be prudent to have her colonoscopy repeated prior to surgery.    Cresencio Morris MD  10/10/20  13:22 EDT

## 2020-10-11 LAB
ANION GAP SERPL CALCULATED.3IONS-SCNC: 8 MMOL/L (ref 5–15)
BACTERIA SPEC AEROBE CULT: NORMAL
BACTERIA SPEC AEROBE CULT: NORMAL
BASOPHILS # BLD AUTO: 0.02 10*3/MM3 (ref 0–0.2)
BASOPHILS NFR BLD AUTO: 0.3 % (ref 0–1.5)
BUN SERPL-MCNC: 7 MG/DL (ref 6–20)
BUN/CREAT SERPL: 8.5 (ref 7–25)
CALCIUM SPEC-SCNC: 8.6 MG/DL (ref 8.6–10.5)
CHLORIDE SERPL-SCNC: 104 MMOL/L (ref 98–107)
CO2 SERPL-SCNC: 26 MMOL/L (ref 22–29)
CREAT SERPL-MCNC: 0.82 MG/DL (ref 0.57–1)
DEPRECATED RDW RBC AUTO: 42.7 FL (ref 37–54)
EOSINOPHIL # BLD AUTO: 0.14 10*3/MM3 (ref 0–0.4)
EOSINOPHIL NFR BLD AUTO: 2 % (ref 0.3–6.2)
ERYTHROCYTE [DISTWIDTH] IN BLOOD BY AUTOMATED COUNT: 12.9 % (ref 12.3–15.4)
GFR SERPL CREATININE-BSD FRML MDRD: 71 ML/MIN/1.73
GLUCOSE SERPL-MCNC: 107 MG/DL (ref 65–99)
HCT VFR BLD AUTO: 27.5 % (ref 34–46.6)
HGB BLD-MCNC: 8.8 G/DL (ref 12–15.9)
IMM GRANULOCYTES # BLD AUTO: 0.04 10*3/MM3 (ref 0–0.05)
IMM GRANULOCYTES NFR BLD AUTO: 0.6 % (ref 0–0.5)
LYMPHOCYTES # BLD AUTO: 3.31 10*3/MM3 (ref 0.7–3.1)
LYMPHOCYTES NFR BLD AUTO: 48.2 % (ref 19.6–45.3)
MCH RBC QN AUTO: 29.4 PG (ref 26.6–33)
MCHC RBC AUTO-ENTMCNC: 32 G/DL (ref 31.5–35.7)
MCV RBC AUTO: 92 FL (ref 79–97)
MONOCYTES # BLD AUTO: 0.8 10*3/MM3 (ref 0.1–0.9)
MONOCYTES NFR BLD AUTO: 11.6 % (ref 5–12)
NEUTROPHILS NFR BLD AUTO: 2.56 10*3/MM3 (ref 1.7–7)
NEUTROPHILS NFR BLD AUTO: 37.3 % (ref 42.7–76)
NRBC BLD AUTO-RTO: 0 /100 WBC (ref 0–0.2)
PLAT MORPH BLD: NORMAL
PLATELET # BLD AUTO: 338 10*3/MM3 (ref 140–450)
PMV BLD AUTO: 11.3 FL (ref 6–12)
POTASSIUM SERPL-SCNC: 3.7 MMOL/L (ref 3.5–5.2)
RBC # BLD AUTO: 2.99 10*6/MM3 (ref 3.77–5.28)
RBC MORPH BLD: NORMAL
SODIUM SERPL-SCNC: 138 MMOL/L (ref 136–145)
VIT B12 BLD-MCNC: <150 PG/ML (ref 211–946)
WBC # BLD AUTO: 6.87 10*3/MM3 (ref 3.4–10.8)
WBC MORPH BLD: NORMAL

## 2020-10-11 PROCEDURE — 85007 BL SMEAR W/DIFF WBC COUNT: CPT | Performed by: INTERNAL MEDICINE

## 2020-10-11 PROCEDURE — 99232 SBSQ HOSP IP/OBS MODERATE 35: CPT | Performed by: HOSPITALIST

## 2020-10-11 PROCEDURE — 25010000002 ENOXAPARIN PER 10 MG: Performed by: INTERNAL MEDICINE

## 2020-10-11 PROCEDURE — 82607 VITAMIN B-12: CPT | Performed by: INTERNAL MEDICINE

## 2020-10-11 PROCEDURE — 80048 BASIC METABOLIC PNL TOTAL CA: CPT | Performed by: INTERNAL MEDICINE

## 2020-10-11 PROCEDURE — 25010000002 PIPERACILLIN SOD-TAZOBACTAM PER 1 G: Performed by: INTERNAL MEDICINE

## 2020-10-11 PROCEDURE — 94799 UNLISTED PULMONARY SVC/PX: CPT

## 2020-10-11 PROCEDURE — 85025 COMPLETE CBC W/AUTO DIFF WBC: CPT | Performed by: INTERNAL MEDICINE

## 2020-10-11 RX ADMIN — OXYCODONE HYDROCHLORIDE AND ACETAMINOPHEN 1 TABLET: 7.5; 325 TABLET ORAL at 06:29

## 2020-10-11 RX ADMIN — OXYCODONE HYDROCHLORIDE AND ACETAMINOPHEN 1 TABLET: 7.5; 325 TABLET ORAL at 09:25

## 2020-10-11 RX ADMIN — LOSARTAN POTASSIUM 50 MG: 50 TABLET, FILM COATED ORAL at 09:17

## 2020-10-11 RX ADMIN — DOCUSATE SODIUM 100 MG: 100 CAPSULE ORAL at 21:30

## 2020-10-11 RX ADMIN — DOCUSATE SODIUM 100 MG: 100 CAPSULE ORAL at 09:18

## 2020-10-11 RX ADMIN — SIMETHICONE 80 MG: 80 TABLET, CHEWABLE ORAL at 09:26

## 2020-10-11 RX ADMIN — SODIUM CHLORIDE, PRESERVATIVE FREE 10 ML: 5 INJECTION INTRAVENOUS at 09:19

## 2020-10-11 RX ADMIN — FERROUS SULFATE TAB 325 MG (65 MG ELEMENTAL FE) 325 MG: 325 (65 FE) TAB at 09:18

## 2020-10-11 RX ADMIN — TAZOBACTAM SODIUM AND PIPERACILLIN SODIUM 3.38 G: 375; 3 INJECTION, SOLUTION INTRAVENOUS at 02:37

## 2020-10-11 RX ADMIN — Medication 1 CAPSULE: at 09:17

## 2020-10-11 RX ADMIN — TAZOBACTAM SODIUM AND PIPERACILLIN SODIUM 3.38 G: 375; 3 INJECTION, SOLUTION INTRAVENOUS at 11:29

## 2020-10-11 RX ADMIN — OXYCODONE HYDROCHLORIDE AND ACETAMINOPHEN 1 TABLET: 7.5; 325 TABLET ORAL at 15:13

## 2020-10-11 RX ADMIN — TAZOBACTAM SODIUM AND PIPERACILLIN SODIUM 3.38 G: 375; 3 INJECTION, SOLUTION INTRAVENOUS at 21:45

## 2020-10-11 RX ADMIN — OXYCODONE HYDROCHLORIDE AND ACETAMINOPHEN 1 TABLET: 7.5; 325 TABLET ORAL at 22:37

## 2020-10-11 RX ADMIN — CITALOPRAM 40 MG: 40 TABLET, FILM COATED ORAL at 09:17

## 2020-10-11 RX ADMIN — ENOXAPARIN SODIUM 40 MG: 40 INJECTION SUBCUTANEOUS at 21:30

## 2020-10-11 RX ADMIN — SODIUM CHLORIDE, PRESERVATIVE FREE 10 ML: 5 INJECTION INTRAVENOUS at 21:49

## 2020-10-11 NOTE — PROGRESS NOTES
Daja Hubbard     LOS: 5 days     Subjective     Patient feels much better today.  Tolerating diet.  Afebrile.    Objective     Vital Signs  Vitals:    10/11/20 0700   BP: 121/69   Pulse: 71   Resp: 16   Temp: 98.7 °F (37.1 °C)   SpO2:        I/O  Intake & Output (last day)       10/10 0701 - 10/11 0700 10/11 0701 - 10/12 0700    Urine (mL/kg/hr)      Total Output      Net                  Physical Exam:  Abdomen soft and and now nontender.         Results Review:       WBC WBC   Date Value Ref Range Status   10/11/2020 6.87 3.40 - 10.80 10*3/mm3 Final   10/10/2020 6.04 3.40 - 10.80 10*3/mm3 Final   10/09/2020 10.34 3.40 - 10.80 10*3/mm3 Final      HGB Hemoglobin   Date Value Ref Range Status   10/11/2020 8.8 (L) 12.0 - 15.9 g/dL Final   10/10/2020 8.9 (L) 12.0 - 15.9 g/dL Final   10/09/2020 10.4 (L) 12.0 - 15.9 g/dL Final      HCT Hematocrit   Date Value Ref Range Status   10/11/2020 27.5 (L) 34.0 - 46.6 % Final   10/10/2020 27.3 (L) 34.0 - 46.6 % Final   10/09/2020 32.6 (L) 34.0 - 46.6 % Final      PLT        Results from last 7 days   Lab Units 10/11/20  0336   PLATELETS 10*3/mm3 338            Sodium Sodium   Date Value Ref Range Status   10/11/2020 138 136 - 145 mmol/L Final   10/10/2020 136 136 - 145 mmol/L Final   10/09/2020 136 136 - 145 mmol/L Final      Potassium Potassium   Date Value Ref Range Status   10/11/2020 3.7 3.5 - 5.2 mmol/L Final     Comment:     Slight hemolysis detected by analyzer. Results may be affected.   10/10/2020 3.8 3.5 - 5.2 mmol/L Final   10/09/2020 3.6 3.5 - 5.2 mmol/L Final      Chloride Chloride   Date Value Ref Range Status   10/11/2020 104 98 - 107 mmol/L Final   10/10/2020 102 98 - 107 mmol/L Final   10/09/2020 102 98 - 107 mmol/L Final      Bicarbonate No results found for: PLASMABICARB   BUN BUN   Date Value Ref Range Status   10/11/2020 7 6 - 20 mg/dL Final   10/10/2020 7 6 - 20 mg/dL Final   10/09/2020 7 6 - 20 mg/dL Final      Creatinine Creatinine   Date Value Ref Range  Status   10/11/2020 0.82 0.57 - 1.00 mg/dL Final   10/10/2020 0.86 0.57 - 1.00 mg/dL Final   10/09/2020 0.99 0.57 - 1.00 mg/dL Final      Calcium Calcium   Date Value Ref Range Status   10/11/2020 8.6 8.6 - 10.5 mg/dL Final   10/10/2020 8.7 8.6 - 10.5 mg/dL Final   10/09/2020 9.0 8.6 - 10.5 mg/dL Final      Magnesium No results found for: MG         Imaging Results (Last 24 Hours)     Procedure Component Value Units Date/Time    CT Abdomen Pelvis With Contrast [[id number]] Collected: 10/09/20 1709     Updated: 10/10/20 1214    Narrative:      EXAMINATION: CT ABDOMEN/PELVIS W CONTRAST - 10/09/2020     INDICATION:  A41.9-Sepsis, unspecified organism; K57.92-Diverticulitis  of intestine, part unspecified, without perforation or abscess without  bleeding; N39.0-Urinary tract infection, site not specified; N17.9-Acute  kidney failure, unspecified.      TECHNIQUE: CT abdomen and pelvis without intravenous contrast.     The radiation dose reduction device was turned on for each scan per the  ALARA (As Low as Reasonably Achievable) protocol.     COMPARISON: CT dated 10/06/2020.     FINDINGS: Lung bases are grossly clear. Liver without focal lesion  measuring mild hepatic steatosis. Gallbladder appears at least partially  distended without contrast. No gross biliary abnormalities. Pancreas and  spleen unremarkable. Adrenals without distinct nodule. Kidneys without  hydronephrosis or hydroureter. No bulky retroperitoneal adenopathy.  Atherosclerotic nonaneurysmal abdominal aorta. GI tract evaluation with  small hiatal hernia. No mechanical obstructive process with inflammatory  findings of the redundant sigmoid colon in the lower midabdomen where  there is diverticular disease consistent with acute diverticulitis with  pericolonic stranding but no evidence for perforation or abscess.  Overall inflammatory process is mildly increased involvement from  10/06/2020 recent comparison. Pelvic viscera otherwise unremarkable  without  bulky pelvic adenopathy or free fluid.       Impression:      Mildly increased inflammatory process of acute sigmoid  diverticulitis within the lower midabdomen however redundant sigmoid  colon without perforation or abscess.     DICTATED:   10/09/2020  EDITED/ls :   10/09/2020         This report was finalized on 10/10/2020 12:11 PM by Dr. Jude Negro.             Assessment/Plan       SIRS (systemic inflammatory response syndrome) (CMS/HCC)    Sigmoid diverticulitis    Essential hypertension    Hyponatremia      Recurrent diverticulitis now improving on IV antibiotics.  Plans for home antibiotics pending.  Hopefully can have this episode treated with plans for elective surgery when the inflammation has improved.    Cresencio Morris MD  10/11/20  09:31 EDT

## 2020-10-11 NOTE — PLAN OF CARE
Goal Outcome Evaluation:  Plan of Care Reviewed With: patient  Progress: improving     VSS on RA. No O2 needed overnight.  2200 Pt had tuna salad sandwich and chips. Tolerated well.   Pain managed well with PO meds.       Problem: Adult Inpatient Plan of Care  Goal: Absence of Hospital-Acquired Illness or Injury  Intervention: Identify and Manage Fall Risk  Recent Flowsheet Documentation  Taken 10/11/2020 0400 by Nurys Aponte RN  Safety Promotion/Fall Prevention:   assistive device/personal items within reach   clutter free environment maintained   fall prevention program maintained   nonskid shoes/slippers when out of bed   room organization consistent   safety round/check completed  Taken 10/11/2020 0200 by Nurys Aponte RN  Safety Promotion/Fall Prevention:   clutter free environment maintained   assistive device/personal items within reach   fall prevention program maintained   nonskid shoes/slippers when out of bed   room organization consistent   safety round/check completed  Taken 10/11/2020 0000 by Nurys Aponte RN  Safety Promotion/Fall Prevention:   clutter free environment maintained   fall prevention program maintained   assistive device/personal items within reach   nonskid shoes/slippers when out of bed   room organization consistent   safety round/check completed  Taken 10/10/2020 2000 by Nurys Aponte RN  Safety Promotion/Fall Prevention:   clutter free environment maintained   fall prevention program maintained   nonskid shoes/slippers when out of bed   safety round/check completed   room organization consistent   assistive device/personal items within reach  Intervention: Prevent Skin Injury  Recent Flowsheet Documentation  Taken 10/11/2020 0400 by Nurys Aponte RN  Body Position:   position changed independently   side-lying, left  Taken 10/11/2020 0200 by Nurys Aponte RN  Body Position: position changed independently  Taken 10/11/2020 0000 by Nurys Aponte RN  Body Position:   position  changed independently   side-lying, left  Taken 10/10/2020 2000 by Nurys Aponte RN  Body Position: position changed independently  Goal: Optimal Comfort and Wellbeing  Intervention: Provide Person-Centered Care  Recent Flowsheet Documentation  Taken 10/10/2020 2000 by Nurys Aponte RN  Trust Relationship/Rapport:   care explained   emotional support provided   empathic listening provided   questions encouraged   questions answered   thoughts/feelings acknowledged     Problem: Adjustment to Illness (Sepsis/Septic Shock)  Goal: Optimal Coping  Intervention: Optimize Psychosocial Adjustment to Illness  Recent Flowsheet Documentation  Taken 10/10/2020 2000 by Nurys Aponte RN  Supportive Measures: active listening utilized     Problem: Glycemic Control Impaired (Sepsis/Septic Shock)  Goal: Blood Glucose Level Within Desired Range  Intervention: Optimize Glycemic Control  Recent Flowsheet Documentation  Taken 10/10/2020 2000 by Nurys Aponte RN  Glycemic Management: oral hydration promoted     Problem: Hemodynamic Instability (Sepsis/Septic Shock)  Goal: Effective Tissue Perfusion  Intervention: Optimize Blood Flow  Recent Flowsheet Documentation  Taken 10/10/2020 2000 by Nurys Aponte RN  Fluid/Electrolyte Management: fluids provided     Problem: Respiratory Compromise (Sepsis/Septic Shock)  Goal: Effective Oxygenation and Ventilation  Intervention: Optimize Oxygenation and Ventilation  Recent Flowsheet Documentation  Taken 10/11/2020 0400 by Nurys Aponte RN  Head of Bed (HOB): HOB lowered  Taken 10/11/2020 0200 by Nurys Aponte RN  Head of Bed (HOB): HOB elevated  Taken 10/11/2020 0000 by Nurys Aponte RN  Head of Bed (HOB): HOB lowered  Taken 10/10/2020 2000 by Nurys Aponte RN  Head of Bed (HOB):   HOB elevated   HOB at 30-45 degrees

## 2020-10-11 NOTE — PLAN OF CARE
Goal Outcome Evaluation:  Plan of Care Reviewed With: patient  Progress: no change  Outcome Summary: VSS, patient reports pain is the same as yesterday. Dr. Morris at bedside, pending home with IV abx's or o/p surgery. Will continue to monitor.        Problem: Adult Inpatient Plan of Care  Goal: Plan of Care Review  Outcome: Ongoing, Progressing  Flowsheets (Taken 10/11/2020 1740)  Progress: no change  Plan of Care Reviewed With: patient  Outcome Summary: VSS, patient reports pain is the same as yesterday. Dr. Morris at bedside, pending home with IV abx's or o/p surgery. Will continue to monitor.  Goal: Patient-Specific Goal (Individualized)  Outcome: Ongoing, Progressing  Goal: Absence of Hospital-Acquired Illness or Injury  Outcome: Ongoing, Progressing  Intervention: Identify and Manage Fall Risk  Recent Flowsheet Documentation  Taken 10/11/2020 1600 by Aleena Padilla RN  Safety Promotion/Fall Prevention: activity supervised  Taken 10/11/2020 1400 by Aleena Padilla RN  Safety Promotion/Fall Prevention: activity supervised  Taken 10/11/2020 1200 by Aleena Padilla RN  Safety Promotion/Fall Prevention:   activity supervised   safety round/check completed   nonskid shoes/slippers when out of bed  Taken 10/11/2020 1000 by Aleena Padilla RN  Safety Promotion/Fall Prevention:   activity supervised   safety round/check completed   nonskid shoes/slippers when out of bed  Taken 10/11/2020 0838 by Aleena Padilla RN  Safety Promotion/Fall Prevention: activity supervised  Intervention: Prevent Skin Injury  Recent Flowsheet Documentation  Taken 10/11/2020 1737 by Aleena Padilla RN  Body Position: position changed independently  Taken 10/11/2020 1600 by Aleena Padilla RN  Body Position: position changed independently  Taken 10/11/2020 1400 by Aleena Padilla RN  Body Position: position changed independently  Taken 10/11/2020 1200 by Aleena Padilla RN  Body Position: position changed  independently  Taken 10/11/2020 1000 by Aleena Padilla RN  Body Position: position changed independently  Taken 10/11/2020 0838 by Aleena Padilla RN  Body Position: position changed independently  Intervention: Prevent and Manage VTE (venous thromboembolism) Risk  Recent Flowsheet Documentation  Taken 10/11/2020 0838 by Aleena Padilla RN  VTE Prevention/Management:   bilateral   dorsiflexion/plantar flexion performed  Goal: Optimal Comfort and Wellbeing  Outcome: Ongoing, Progressing  Intervention: Provide Person-Centered Care  Recent Flowsheet Documentation  Taken 10/11/2020 1737 by Aleena Padilla RN  Trust Relationship/Rapport:   care explained   choices provided   emotional support provided   empathic listening provided   questions answered   questions encouraged   reassurance provided   thoughts/feelings acknowledged  Taken 10/11/2020 1600 by Aleena Padilla RN  Trust Relationship/Rapport:   care explained   choices provided   emotional support provided   empathic listening provided   questions answered   questions encouraged   reassurance provided   thoughts/feelings acknowledged  Taken 10/11/2020 1400 by Aleena Padilla RN  Trust Relationship/Rapport:   care explained   choices provided   emotional support provided   empathic listening provided   questions answered   questions encouraged   reassurance provided   thoughts/feelings acknowledged  Taken 10/11/2020 1200 by Aleena Padilla RN  Trust Relationship/Rapport:   care explained   choices provided   emotional support provided   empathic listening provided   questions answered   questions encouraged   reassurance provided   thoughts/feelings acknowledged  Taken 10/11/2020 1000 by Aleena Padilla RN  Trust Relationship/Rapport:   care explained   choices provided   emotional support provided   empathic listening provided   questions answered   questions encouraged   reassurance provided   thoughts/feelings acknowledged  Taken  10/11/2020 0838 by Aleena Padilla, RN  Trust Relationship/Rapport:   care explained   choices provided   emotional support provided   empathic listening provided   questions answered   questions encouraged   reassurance provided   thoughts/feelings acknowledged  Goal: Readiness for Transition of Care  Outcome: Ongoing, Progressing     Problem: Adjustment to Illness (Sepsis/Septic Shock)  Goal: Optimal Coping  Outcome: Ongoing, Progressing     Problem: Bleeding (Sepsis/Septic Shock)  Goal: Absence of Bleeding  Outcome: Ongoing, Progressing     Problem: Glycemic Control Impaired (Sepsis/Septic Shock)  Goal: Blood Glucose Level Within Desired Range  Outcome: Ongoing, Progressing     Problem: Hemodynamic Instability (Sepsis/Septic Shock)  Goal: Effective Tissue Perfusion  Outcome: Ongoing, Progressing     Problem: Infection (Sepsis/Septic Shock)  Goal: Absence of Infection Signs and Symptoms  Outcome: Ongoing, Progressing     Problem: Nutrition Impaired (Sepsis/Septic Shock)  Goal: Optimal Nutrition Intake  Outcome: Ongoing, Progressing     Problem: Respiratory Compromise (Sepsis/Septic Shock)  Goal: Effective Oxygenation and Ventilation  Outcome: Ongoing, Progressing  Intervention: Promote Airway Secretion Clearance  Recent Flowsheet Documentation  Taken 10/11/2020 1737 by Aleena Padilla RN  Activity Management: activity adjusted per tolerance  Cough And Deep Breathing: done independently per patient  Taken 10/11/2020 1600 by Aleena Padilla RN  Activity Management: activity adjusted per tolerance  Taken 10/11/2020 1400 by Aleena Padilla, RN  Activity Management: activity adjusted per tolerance  Cough And Deep Breathing: done independently per patient  Taken 10/11/2020 1200 by Aleena Padilla RN  Activity Management: activity adjusted per tolerance  Cough And Deep Breathing: done independently per patient  Taken 10/11/2020 1000 by Aleena Padilla, RN  Activity Management: activity adjusted per  tolerance  Taken 10/11/2020 0838 by Aleena Padilla RN  Activity Management: activity adjusted per tolerance  Cough And Deep Breathing: done independently per patient  Intervention: Optimize Oxygenation and Ventilation  Recent Flowsheet Documentation  Taken 10/11/2020 1737 by Aleena Padilla RN  Airway/Ventilation Management: airway patency maintained  Taken 10/11/2020 1400 by Aleena Padilla RN  Head of Bed (HOB): HOB elevated  Taken 10/11/2020 1200 by Aleena Padilla RN  Airway/Ventilation Management: airway patency maintained  Taken 10/11/2020 0838 by Aleena Padilla RN  Head of Bed (HOB): HOB elevated  Airway/Ventilation Management: airway patency maintained     Problem: UTI (Urinary Tract Infection)  Goal: Improved Infection Symptoms  Outcome: Ongoing, Progressing

## 2020-10-12 VITALS
DIASTOLIC BLOOD PRESSURE: 89 MMHG | BODY MASS INDEX: 31.31 KG/M2 | HEART RATE: 56 BPM | OXYGEN SATURATION: 92 % | RESPIRATION RATE: 14 BRPM | TEMPERATURE: 97.3 F | WEIGHT: 183.4 LBS | HEIGHT: 64 IN | SYSTOLIC BLOOD PRESSURE: 155 MMHG

## 2020-10-12 PROBLEM — E87.1 HYPONATREMIA: Status: RESOLVED | Noted: 2020-10-06 | Resolved: 2020-10-12

## 2020-10-12 PROBLEM — R65.10 SIRS (SYSTEMIC INFLAMMATORY RESPONSE SYNDROME) (HCC): Status: RESOLVED | Noted: 2020-10-06 | Resolved: 2020-10-12

## 2020-10-12 LAB
ANION GAP SERPL CALCULATED.3IONS-SCNC: 7 MMOL/L (ref 5–15)
BUN SERPL-MCNC: 6 MG/DL (ref 6–20)
BUN/CREAT SERPL: 6.5 (ref 7–25)
CALCIUM SPEC-SCNC: 9.1 MG/DL (ref 8.6–10.5)
CHLORIDE SERPL-SCNC: 103 MMOL/L (ref 98–107)
CO2 SERPL-SCNC: 29 MMOL/L (ref 22–29)
CREAT SERPL-MCNC: 0.92 MG/DL (ref 0.57–1)
DEPRECATED RDW RBC AUTO: 42.9 FL (ref 37–54)
ERYTHROCYTE [DISTWIDTH] IN BLOOD BY AUTOMATED COUNT: 13 % (ref 12.3–15.4)
GFR SERPL CREATININE-BSD FRML MDRD: 62 ML/MIN/1.73
GLUCOSE SERPL-MCNC: 98 MG/DL (ref 65–99)
HCT VFR BLD AUTO: 31.7 % (ref 34–46.6)
HGB BLD-MCNC: 10.2 G/DL (ref 12–15.9)
MCH RBC QN AUTO: 29.5 PG (ref 26.6–33)
MCHC RBC AUTO-ENTMCNC: 32.2 G/DL (ref 31.5–35.7)
MCV RBC AUTO: 91.6 FL (ref 79–97)
PLATELET # BLD AUTO: 467 10*3/MM3 (ref 140–450)
PMV BLD AUTO: 10.4 FL (ref 6–12)
POTASSIUM SERPL-SCNC: 3.8 MMOL/L (ref 3.5–5.2)
RBC # BLD AUTO: 3.46 10*6/MM3 (ref 3.77–5.28)
SODIUM SERPL-SCNC: 139 MMOL/L (ref 136–145)
WBC # BLD AUTO: 6.28 10*3/MM3 (ref 3.4–10.8)

## 2020-10-12 PROCEDURE — 25010000002 ERTAPENEM PER 500 MG: Performed by: INTERNAL MEDICINE

## 2020-10-12 PROCEDURE — C1894 INTRO/SHEATH, NON-LASER: HCPCS

## 2020-10-12 PROCEDURE — 80048 BASIC METABOLIC PNL TOTAL CA: CPT | Performed by: HOSPITALIST

## 2020-10-12 PROCEDURE — 25010000002 PIPERACILLIN SOD-TAZOBACTAM PER 1 G: Performed by: INTERNAL MEDICINE

## 2020-10-12 PROCEDURE — 85027 COMPLETE CBC AUTOMATED: CPT | Performed by: HOSPITALIST

## 2020-10-12 PROCEDURE — 25010000002 HYDROMORPHONE PER 4 MG: Performed by: INTERNAL MEDICINE

## 2020-10-12 PROCEDURE — 02HV33Z INSERTION OF INFUSION DEVICE INTO SUPERIOR VENA CAVA, PERCUTANEOUS APPROACH: ICD-10-PCS | Performed by: INTERNAL MEDICINE

## 2020-10-12 PROCEDURE — 99239 HOSP IP/OBS DSCHRG MGMT >30: CPT | Performed by: HOSPITALIST

## 2020-10-12 PROCEDURE — C1751 CATH, INF, PER/CENT/MIDLINE: HCPCS

## 2020-10-12 RX ORDER — L.ACID,PARA/B.BIFIDUM/S.THERM 8B CELL
1 CAPSULE ORAL DAILY
Qty: 30 CAPSULE | Refills: 0 | Status: SHIPPED | OUTPATIENT
Start: 2020-10-13 | End: 2020-11-12

## 2020-10-12 RX ORDER — SODIUM CHLORIDE 0.9 % (FLUSH) 0.9 %
10 SYRINGE (ML) INJECTION AS NEEDED
Status: DISCONTINUED | OUTPATIENT
Start: 2020-10-12 | End: 2020-10-12 | Stop reason: HOSPADM

## 2020-10-12 RX ORDER — SODIUM CHLORIDE 0.9 % (FLUSH) 0.9 %
10 SYRINGE (ML) INJECTION EVERY 12 HOURS SCHEDULED
Status: DISCONTINUED | OUTPATIENT
Start: 2020-10-12 | End: 2020-10-12 | Stop reason: HOSPADM

## 2020-10-12 RX ORDER — SODIUM CHLORIDE 0.9 % (FLUSH) 0.9 %
20 SYRINGE (ML) INJECTION AS NEEDED
Status: DISCONTINUED | OUTPATIENT
Start: 2020-10-12 | End: 2020-10-12 | Stop reason: HOSPADM

## 2020-10-12 RX ORDER — OXYCODONE AND ACETAMINOPHEN 7.5; 325 MG/1; MG/1
1 TABLET ORAL EVERY 6 HOURS PRN
Qty: 12 TABLET | Refills: 0 | Status: SHIPPED | OUTPATIENT
Start: 2020-10-12 | End: 2020-10-15

## 2020-10-12 RX ORDER — FERROUS SULFATE 325(65) MG
325 TABLET ORAL
Qty: 30 TABLET | Refills: 0 | Status: SHIPPED | OUTPATIENT
Start: 2020-10-13 | End: 2020-11-10 | Stop reason: HOSPADM

## 2020-10-12 RX ADMIN — SIMETHICONE 80 MG: 80 TABLET, CHEWABLE ORAL at 08:40

## 2020-10-12 RX ADMIN — TAZOBACTAM SODIUM AND PIPERACILLIN SODIUM 3.38 G: 375; 3 INJECTION, SOLUTION INTRAVENOUS at 05:42

## 2020-10-12 RX ADMIN — Medication 1 CAPSULE: at 08:40

## 2020-10-12 RX ADMIN — DOCUSATE SODIUM 100 MG: 100 CAPSULE ORAL at 08:40

## 2020-10-12 RX ADMIN — CITALOPRAM 40 MG: 40 TABLET, FILM COATED ORAL at 08:40

## 2020-10-12 RX ADMIN — HYDROMORPHONE HYDROCHLORIDE 0.5 MG: 1 INJECTION, SOLUTION INTRAMUSCULAR; INTRAVENOUS; SUBCUTANEOUS at 14:28

## 2020-10-12 RX ADMIN — LOSARTAN POTASSIUM 50 MG: 50 TABLET, FILM COATED ORAL at 08:40

## 2020-10-12 RX ADMIN — SODIUM CHLORIDE, PRESERVATIVE FREE 10 ML: 5 INJECTION INTRAVENOUS at 08:41

## 2020-10-12 RX ADMIN — ERTAPENEM SODIUM 1 G: 1 INJECTION, POWDER, LYOPHILIZED, FOR SOLUTION INTRAMUSCULAR; INTRAVENOUS at 12:58

## 2020-10-12 RX ADMIN — OXYCODONE HYDROCHLORIDE AND ACETAMINOPHEN 1 TABLET: 7.5; 325 TABLET ORAL at 12:58

## 2020-10-12 RX ADMIN — FERROUS SULFATE TAB 325 MG (65 MG ELEMENTAL FE) 325 MG: 325 (65 FE) TAB at 08:40

## 2020-10-12 RX ADMIN — OXYCODONE HYDROCHLORIDE AND ACETAMINOPHEN 1 TABLET: 7.5; 325 TABLET ORAL at 08:40

## 2020-10-12 NOTE — PROGRESS NOTES
Continued Stay Note  [facility]     Patient Name: Daja Hubbard  MRN: [id number]  Today's Date: 10/12/2020    Admit Date: 10/6/2020    Discharge Plan     Row Name 10/12/20 1137       Plan    Plan  Home with     Patient/Family in Agreement with Plan  yes    Plan Comments  I spoke with the pt. She wants to keep her home care and infusion thru [facility]. I called the HH referral to Jordin  with [facility] and called the home infusion referral to Rhonda with [facility] per pt choice. Will await coverage for the home infusion meds. She will need a dose before DC today and a picc.    Final Discharge Disposition Code  06 - home with home health care        Discharge Codes    No documentation.       Expected Discharge Date and Time     Expected Discharge Date Expected Discharge Time    Oct 12, 2020             Coleen Saldana RN

## 2020-10-12 NOTE — PROGRESS NOTES
Case Management Discharge Note      Final Note: [facility] will deliver the IV AB and supplies to the room before DC today and teach. [facility] will f/u with the pt after DC.         Selected Continued Care - Admitted Since 10/6/2020     Destination    No services have been selected for the patient.              Durable Medical Equipment    No services have been selected for the patient.              Dialysis/Infusion Coordination complete    Service Provider Selected Services Address Phone Fax    [facility]  Infusion and IV Therapy [address], [facility] [address] [phone]          Home Medical Care Coordination complete    Service Provider Selected Services Address Phone Fax    [facility]  [facility] [address] [address] [phone]          Therapy    No services have been selected for the patient.              Community Resources    No services have been selected for the patient.                       Final Discharge Disposition Code: 06 - home with home health care

## 2020-10-12 NOTE — PLAN OF CARE
Goal Outcome Evaluation:  Plan of Care Reviewed With: patient  Progress: no change  Outcome Summary: Pt is looking forward to possible discharge today after Infectious Diseaase doctors determine home  antibiotic regimen. Pts IV was not changed due to the possibly of discharge or PIC LINE placement today. PT was found to be bradycardic tonight with heart rate as low as high 40's low 50's . Oxygen was placed at 2 liters and heart rate did return back into the 60-70 range. Pt states she has never been tessted for sleep apnea which may wnt to be considered prior to her colorectal surgery tentatively scheduled in two-three weeks. PT has not had any stool  for me and was medicated once for lower LLQ abdominal pain.Pt continues on IV antibiotics..

## 2020-10-12 NOTE — PROGRESS NOTES
"[facility]    INPATIENT PROGRESS NOTE  10/12/2020      PATIENT NAME: Daja Hubbard  :  1960  MRN:  [id number]  Date of Admission:  10/6/2020      Antimicrobials:  Anti-Infectives (From admission, onward)    Ordered     Dose/Rate Route Frequency Start Stop    10/06/20 1430  piperacillin-tazobactam (ZOSYN) 3.375 g in iso-osmotic dextrose 50 ml (premix)     Shiva Weller, PharmD reviewed the order on 10/09/20 0722.   Ordering Provider: Peng Sebastian MD    3.375 g  over 4 Hours Intravenous Every 8 Hours 10/06/20 1800 10/13/20 1359    10/06/20 1051  piperacillin-tazobactam (ZOSYN) 3.375 g in iso-osmotic dextrose 50 ml (premix)     Ordering Provider: Sanford Palmer PA-C    3.375 g  over 30 Minutes Intravenous Once 10/06/20 1053 10/06/20 1150          MAR reviewed.      Chief Complaint   Patient presents with   • Abdominal Pain       Reason for consultation: Recurrent diverticulitis    Interval history:  No fevers over the weekend.  Repeat CT with mildly increased inflammation but no abscess or perforation.  Abd pain is doing better.  No n/v and normal BM this am.  Appetite still not great.  She wonders if eating sesame seeds brought on her relapse.     ROS:  As above.  No new rashes.  No SOB or chest pain.  Denies side effects from antimicrobials.      Objective:  Temp (24hrs), Av.1 °F (36.7 °C), Min:97.3 °F (36.3 °C), Max:98.9 °F (37.2 °C)    /89 (BP Location: Right arm, Patient Position: Lying)   Pulse 56   Temp 97.3 °F (36.3 °C) (Oral)   Resp 14   Ht 162.6 cm (64\")   Wt 83.2 kg (183 lb 6.4 oz)   SpO2 92%   BMI 31.48 kg/m²     Physical Examination:  GENERAL: Awake and alert, in no acute distress.   HEENT: EOMI. No conjunctival injection or subconjunctival hemorrhage. No icterus.  CV: RRR. No murmur, rubs, gallops.  Normal S1S2.  LUNGS: Clear to auscultation bilaterally without wheezing, rales, rhonchi. Normal respiratory effort.  ABDOMEN: Soft, LLQ " tenderness to moderate palpation - improved, non distended. Positive bowel sounds. No rebound or guarding.    EXT:  No clubbing, cyanosis, or edema.  MSK: No joint effusions or inflammation noted.  SKIN: Warm and dry without rash or ulcer.  NEURO: A&Ox4. No focal deficits.  Face symmetric.  Speech fluent.  Moves all extremities well.   PSYCHIATRIC: Normal insight and judgement.  Cooperative.  Normal affect.       Laboratory Data:    Results from last 7 days   Lab Units 10/12/20  0351 10/11/20  0336 10/10/20  0318   WBC 10*3/mm3 6.28 6.87 6.04   HEMOGLOBIN g/dL 10.2* 8.8* 8.9*   HEMATOCRIT % 31.7* 27.5* 27.3*   PLATELETS 10*3/mm3 467* 338 267     Results from last 7 days   Lab Units 10/12/20  0351   SODIUM mmol/L 139   POTASSIUM mmol/L 3.8   CHLORIDE mmol/L 103   CO2 mmol/L 29.0   BUN mg/dL 6   CREATININE mg/dL 0.92   GLUCOSE mg/dL 98   CALCIUM mg/dL 9.1     Results from last 7 days   Lab Units 10/06/20  0958   ALK PHOS U/L 93   BILIRUBIN mg/dL 0.6   ALT (SGPT) U/L 7   AST (SGOT) U/L 11             Estimated Creatinine Clearance: 68.7 mL/min (by C-G formula based on SCr of 0.92 mg/dL).  Results from last 7 days   Lab Units 10/06/20  0958   LACTATE mmol/L 1.1                   Microbiology:  Microbiology Results (last 10 days)     Procedure Component Value - Date/Time    COVID PRE-OP / PRE-PROCEDURE SCREENING ORDER (NO ISOLATION) - Swab, Nasopharynx [[id number]] Collected: 10/06/20 1435    Lab Status: Final result Specimen: Swab from Nasopharynx Updated: 10/08/20 1535    Narrative:      The following orders were created for panel order COVID PRE-OP / PRE-PROCEDURE SCREENING ORDER (NO ISOLATION) - Swab, Nasopharynx.  Procedure                               Abnormality         Status                     ---------                               -----------         ------                     COVID-19,[facility], NP ...[[id number]]                      Final result                 Please view results for these tests on the  individual orders.    COVID-19,[facility], NP SWAB IN [facility] VIRAL TRANSPORT MEDIA 24-30 HR TAT - Swab, Nasopharynx [[id number]] Collected: 10/06/20 1435    Lab Status: Final result Specimen: Swab from Nasopharynx Updated: 10/08/20 1535     SARS-CoV-2 WILLIAN Not Detected    Blood Culture - Blood, Arm, Left [[id number]] Collected: 10/06/20 1113    Lab Status: Final result Specimen: Blood from Arm, Left Updated: 10/11/20 1130     Blood Culture No growth at 5 days    Blood Culture - Blood, Arm, Left [[id number]] Collected: 10/06/20 1113    Lab Status: Final result Specimen: Blood from Arm, Left Updated: 10/11/20 1130     Blood Culture No growth at 5 days    Urine Culture - Urine, Urine, Clean Catch [[id number]]  (Normal) Collected: 10/06/20 0958    Lab Status: Final result Specimen: Urine, Clean Catch Updated: 10/07/20 2148     Urine Culture No growth            Radiology:  No radiology results for the last day      DISCUSSION:  60 y.o. female with history of diverticulitis who is admitted with several days of vomiting and lower abdominal pain.   Found again to have markedly inflamed mid sigmoid colon but without evidence of perforation or abscess.  She did have a significant leukocytosis.    Repeated CT d/t ongoing pain and fever; no abscess or perforation.  WBC normalized.  Fever resolved.  Pain slowly improving.    PROBLEM LIST:   --Recurrent diverticulitis, uncomplicated.  Initial CT with marked inflammation of sigmoid colon without perforation or abscess.  Continued to have significant abd pain and spiking fever on Zosyn.  Repeat CT negative for perforation or abscess.  Slow improvement.  --Leukocytosis, due to the above.  Resolved.  --Transient mild hypoxia, probably due to poor respiratory effort with abdominal pain.  Doing incentive spirometer.    PLAN:  --Would proceed with PICC and outpatient IV abx - patient is agreeable.  --PICC today  --Change Zosyn to ertapenem in anticipation of discharge today  --CM to arrange  home infusion and home health - Ertapenem 1 g iv daily via PICC through 10/21/20, weekly PICC dressing change, weekly CBC/diff, CMP, ESR, CRP - fax to Dr. Arias at [facility] at [phone].  --Followup with me on Friday, 10/16/20  --OK for discharge if all is arranged    Plan discussed with patient.  Um = 20 minutes    Osmar Arias MD  10/12/2020  11:01 EDT

## 2020-10-12 NOTE — DISCHARGE SUMMARY
[facility] Medicine Services  DISCHARGE SUMMARY    Patient Name: Daja Hubbard  : 1960  MRN: [id number]    Date of Admission: 10/6/2020  9:13 AM  Date of Discharge:  10/12/20  Primary Care Physician: Sp Maire MD    Consults     Date and Time Order Name Status Description    10/7/2020 0032 Inpatient Infectious Diseases Consult Completed     10/6/2020 1500 Inpatient Colorectal Surgery Consult Completed           Hospital Course     Presenting Problem:   Sepsis without acute organ dysfunction, due to unspecified organism (CMS/HCC) [A41.9]  Sepsis without acute organ dysfunction, due to unspecified organism (CMS/HCC) [A41.9]    Active Hospital Problems    Diagnosis  POA   • Sigmoid diverticulitis [K57.32]  Yes   • Essential hypertension [I10]  Yes      Resolved Hospital Problems    Diagnosis Date Resolved POA   • **SIRS (systemic inflammatory response syndrome) (CMS/HCC) [R65.10] 10/12/2020 Yes   • Hyponatremia [E87.1] 10/12/2020 Yes          Hospital Course:  Daja Hubbard is a 60 y.o. female with hx of HTN, depression, and diverticulitis presents due to 2-3 day history of left lower quadrant abdominal pain with associated nausea and vomiting. Previously admitted in 2019 with sigmoid diverticulitis and was seen by Dr. Barney and Dr. Arias, treated with outpatient Invanz at that time. Labs showed WBC 17.71 with elevated procalcitonin 0.81, sodium 130, and mild renal insufficiency with Cr 1.15. CT A/P showed acute sigmoid diverticulitis without perforation or abscess. Admitted to hospitalist service and initiated on IV Zosyn. Colorectal Surgery and Infectious Disease were consulted. Patient developed a fever of 101.2 on 10/8. A repeat CT scan was done 10/9 which showed mildly increased inflammation in the sigmoid colon but no perforation or abscess.     SIRS  Acute sigmoid diverticulitis  --Due to recurrent episodes, Dr. Barney following considering elective surgery in  the future. Will F/U as outpatient in a few weeks.  --Leukocytosis resolved, patient now afebrile, and symptoms have improved.   --Dr. Arias following, plan for PICC line today and home IV Invanz 1 g daily through 10/21/20. F/U with Dr. Arias Friday 10/16/20.     Hyponatremia  --Resolved with fluids.     Mild renal insufficiency  --Resolved with fluids.     HTN  --Continue Losartan.     Anemia  --Low iron levels.  --Continue PO iron at discharge.  --Hb stable.      Discharge Follow Up Recommendations for outpatient labs/diagnostics:  F/U with PCP in 1 week  F/U with Dr. Arias 10/16/20  F/U with Dr. Barney in 2-3 weeks to discuss surgery    Day of Discharge     HPI:   Patient seen this morning. Pain improved. Anticipates going home today.    Review of Systems  Gen-no fevers, no chills  CV-no chest pain, no palpitations  Resp-no cough, no dyspnea  GI-no N/V/D, no abd pain    All other systems reviewed and negative except any additional pertinent positives and negatives as discussed in HPI.      Vital Signs:   Temp:  [97.3 °F (36.3 °C)-98.9 °F (37.2 °C)] 97.3 °F (36.3 °C)  Heart Rate:  [56-78] 56  Resp:  [14-18] 14  BP: (128-165)/(75-89) 155/89     Physical Exam:  Gen-no acute distress  HENT-NCAT, mucous membranes moist  CV-RRR, S1 S2 normal, no m/r/g  Resp-CTAB, no wheezes or rales  Abd-soft, NT, ND, +BS  Ext-no edema  Neuro-A&Ox3, no focal deficits  Skin-no rashes  Psych-appropriate mood      Pertinent  and/or Most Recent Results     Results from last 7 days   Lab Units 10/12/20  0351 10/11/20  0336 10/10/20  0318 10/09/20  0352 10/08/20  0300 10/07/20  0144 10/06/20  0958   WBC 10*3/mm3 6.28 6.87 6.04 10.34 10.38 16.83* 17.71*   HEMOGLOBIN g/dL 10.2* 8.8* 8.9* 10.4* 9.4* 10.8* 11.9*   HEMATOCRIT % 31.7* 27.5* 27.3* 32.6* 29.0* 33.6* 36.5   PLATELETS 10*3/mm3 467* 338 267 292 221 225 235   SODIUM mmol/L 139 138 136 136 134*  --  130*   POTASSIUM mmol/L 3.8 3.7 3.8 3.6 3.9  --  3.9   CHLORIDE mmol/L 103 104 102 102 103   --  97*   CO2 mmol/L 29.0 26.0 25.0 24.0 21.0*  --  23.0   BUN mg/dL 6 7 7 7 7  --  17   CREATININE mg/dL 0.92 0.82 0.86 0.99 0.90  --  1.15*   GLUCOSE mg/dL 98 107* 97 104* 103*  --  126*   CALCIUM mg/dL 9.1 8.6 8.7 9.0 8.4*  --  8.9     Results from last 7 days   Lab Units 10/06/20  0958   BILIRUBIN mg/dL 0.6   ALK PHOS U/L 93   ALT (SGPT) U/L 7   AST (SGOT) U/L 11           Invalid input(s): TG, LDLCALC, LDLREALC  Results from last 7 days   Lab Units 10/06/20  0958   PROCALCITONIN ng/mL 0.81*   LACTATE mmol/L 1.1       Brief Urine Lab Results  (Last result in the past 365 days)      Color   Clarity   Blood   Leuk Est   Nitrite   Protein   CREAT   Urine HCG        10/06/20 0958 Dark Yellow Cloudy Negative Small (1+) Positive 100 mg/dL (2+)               Microbiology Results Abnormal     Procedure Component Value - Date/Time    Blood Culture - Blood, Arm, Left [[id number]] Collected: 10/06/20 1113    Lab Status: Final result Specimen: Blood from Arm, Left Updated: 10/11/20 1130     Blood Culture No growth at 5 days    Blood Culture - Blood, Arm, Left [[id number]] Collected: 10/06/20 1113    Lab Status: Final result Specimen: Blood from Arm, Left Updated: 10/11/20 1130     Blood Culture No growth at 5 days    COVID PRE-OP / PRE-PROCEDURE SCREENING ORDER (NO ISOLATION) - Swab, Nasopharynx [[id number]] Collected: 10/06/20 1435    Lab Status: Final result Specimen: Swab from Nasopharynx Updated: 10/08/20 1535    Narrative:      The following orders were created for panel order COVID PRE-OP / PRE-PROCEDURE SCREENING ORDER (NO ISOLATION) - Swab, Nasopharynx.  Procedure                               Abnormality         Status                     ---------                               -----------         ------                     COVID-19,[facility], NP ...[[id number]]                      Final result                 Please view results for these tests on the individual orders.    COVID-19,[facility], NP SWAB IN [facility]  VIRAL TRANSPORT MEDIA 24-30 HR TAT - Swab, Nasopharynx [[id number]] Collected: 10/06/20 1435    Lab Status: Final result Specimen: Swab from Nasopharynx Updated: 10/08/20 1535     SARS-CoV-2 WILLIAN Not Detected    Urine Culture - Urine, Urine, Clean Catch [[id number]]  (Normal) Collected: 10/06/20 0958    Lab Status: Final result Specimen: Urine, Clean Catch Updated: 10/07/20 2148     Urine Culture No growth          Imaging Results (All)     Procedure Component Value Units Date/Time    CT Abdomen Pelvis With Contrast [[id number]] Collected: 10/09/20 1709     Updated: 10/10/20 1214    Narrative:      EXAMINATION: CT ABDOMEN/PELVIS W CONTRAST - 10/09/2020     INDICATION:  A41.9-Sepsis, unspecified organism; K57.92-Diverticulitis  of intestine, part unspecified, without perforation or abscess without  bleeding; N39.0-Urinary tract infection, site not specified; N17.9-Acute  kidney failure, unspecified.      TECHNIQUE: CT abdomen and pelvis without intravenous contrast.     The radiation dose reduction device was turned on for each scan per the  ALARA (As Low as Reasonably Achievable) protocol.     COMPARISON: CT dated 10/06/2020.     FINDINGS: Lung bases are grossly clear. Liver without focal lesion  measuring mild hepatic steatosis. Gallbladder appears at least partially  distended without contrast. No gross biliary abnormalities. Pancreas and  spleen unremarkable. Adrenals without distinct nodule. Kidneys without  hydronephrosis or hydroureter. No bulky retroperitoneal adenopathy.  Atherosclerotic nonaneurysmal abdominal aorta. GI tract evaluation with  small hiatal hernia. No mechanical obstructive process with inflammatory  findings of the redundant sigmoid colon in the lower midabdomen where  there is diverticular disease consistent with acute diverticulitis with  pericolonic stranding but no evidence for perforation or abscess.  Overall inflammatory process is mildly increased involvement from  10/06/2020 recent  comparison. Pelvic viscera otherwise unremarkable  without bulky pelvic adenopathy or free fluid.       Impression:      Mildly increased inflammatory process of acute sigmoid  diverticulitis within the lower midabdomen however redundant sigmoid  colon without perforation or abscess.     DICTATED:   10/09/2020  EDITED/ls :   10/09/2020         This report was finalized on 10/10/2020 12:11 PM by Dr. Jude Negro.       CT Abdomen Pelvis With Contrast [[id number]] Collected: 10/06/20 1225     Updated: 10/06/20 1233    Narrative:      EXAMINATION: CT ABDOMEN PELVIS W CONTRAST-      INDICATION: LLQ pain, peritoneal signs, hx diverticulitis     TECHNIQUE: Axial IV contrast-enhanced CT of the abdomen and pelvis with  multiplanar reconstructions.     The radiation dose reduction device was turned on for each scan per the  ALARA (As Low as Reasonably Achievable) protocol.     COMPARISON: 1/16/2020     FINDINGS: The lung bases are clear. No aggressive soft tissue body wall  findings. Sacral nerve stimulator noted. No acute fracture or aggressive  osseous lesion. The liver, spleen, pancreas and bilateral adrenal glands  demonstrate homogeneous enhancement without evidence of focal lesion.  The kidneys are unremarkable. No retroperitoneal adenopathy.  Atherosclerotic abdominal aorta. Small and large bowel loops are  nondilated. Similar in appearance and severity to multiple prior  studies, there is mucosal edema and adjacent fat stranding of the mid  sigmoid: With minimal associated diverticular change. No free air or  loculated free fluid concerning for abscess. The pelvic viscera are  unremarkable.       Impression:      Similar appearing short segment marked inflammatory change  of the midsigmoid colon with associated mucosal edema and wall  thickening. No evidence of perforation or abscess. While finding remains  somewhat compatible with acute diverticulitis, this appearance is  similar to multiple prior studies and there  is again minimal associated  diverticulosis, somewhat discordant to the degree of inflammatory  change. Consider additional causes of colitis. Also consider nonemergent  colonoscopy when acute inflammatory process has subsided to exclude  underlying colon mass.        This report was finalized on 10/6/2020 12:30 PM by Savage Horvath.                         Discharge Details        Discharge Medications      New Medications      Instructions Start Date   ertapenem 1 gm/100ml solution IV  Commonly known as: INVanz   1 g, Intravenous, Every 24 Hours      ferrous sulfate 325 (65 FE) MG tablet   325 mg, Oral, Daily With Breakfast   Start Date: October 13, 2020     lactobacillus acidophilus capsule capsule   1 capsule, Oral, Daily   Start Date: October 13, 2020     oxyCODONE-acetaminophen 7.5-325 MG per tablet  Commonly known as: PERCOCET   1 tablet, Oral, Every 6 Hours PRN         Continue These Medications      Instructions Start Date   citalopram 40 MG tablet  Commonly known as: CeleXA   40 mg, Oral, Daily      conjugated estrogens 0.625 MG/GM vaginal cream  Commonly known as: Premarin   Use 0.5 grams intravaginally 1-3 times per week to control symptoms.      cyclobenzaprine 10 MG tablet  Commonly known as: FLEXERIL   10 mg, Oral, 3 Times Daily PRN      losartan 25 MG tablet  Commonly known as: COZAAR   50 mg, Oral, Daily      ondansetron 8 MG tablet  Commonly known as: ZOFRAN   Oral, Every 8 Hours PRN             Allergies   Allergen Reactions   • Sulfa Antibiotics Hives         Discharge Disposition:  Home or Self Care    Diet:  Hospital:  Diet Order   Procedures   • Diet Regular; GI Soft       Activity:  Activity Instructions     Activity as Tolerated                 CODE STATUS:    Code Status and Medical Interventions:   Ordered at: 10/06/20 1427     Level Of Support Discussed With:    Patient     Code Status:    CPR     Medical Interventions (Level of Support Prior to Arrest):    Full       Future  Appointments   Date Time Provider Department Center   12/16/2020 10:00 AM Suman Ortiz MD [facility]       Additional Instructions for the Follow-ups that You Need to Schedule     Ambulatory Referral to Home Health   As directed      Ertapenem 1 g iv daily via PICC through 10/21/20, weekly PICC dressing change, weekly CBC/diff, CMP, ESR, CRP - fax to Dr. Arias at [facility] at [phone].    Order Comments: Ertapenem 1 g iv daily via PICC through 10/21/20, weekly PICC dressing change, weekly CBC/diff, CMP, ESR, CRP - fax to Dr. rAias at [facility] at [phone].     Face to Face Visit Date: 10/12/2020    Follow-up provider for Plan of Care?: I treated the patient in an acute care facility and will not continue treatment after discharge.    Follow-up provider: MYRTLE MARIE [[id number]]    Reason/Clinical Findings: sp hospital stay    Describe mobility limitations that make leaving home difficult: picc line    Nursing/Therapeutic Services Requested: Skilled Nursing    Skilled nursing orders: Infusion therapy Medication education PICC line care/instruction    Frequency: 1 Week 1         Discharge Follow-up with PCP   As directed       Currently Documented PCP:    Myrtle Marie MD    PCP Phone Number:    [phone]     Follow Up Details: 1 week         Discharge Follow-up with Specified Provider: Dr. Arias at [facility]   As directed      To: Dr. Arias at [facility]    Follow Up Details: make appt for Friday, 10/16/20         Discharge Follow-up with Specified Provider: Dr. Barney, Colorectal Surgery in 2-3 weeks   As directed      To: Dr. Barney, Colorectal Surgery in 2-3 weeks                     Areli Almazan MD  10/12/20      Time Spent on Discharge:  I spent  35  minutes on this discharge activity which included: face-to-face encounter with the patient, reviewing the data in the system, coordination of the care with the nursing staff as well as consultants, documentation, and entering orders.

## 2020-10-19 ENCOUNTER — LAB REQUISITION (OUTPATIENT)
Dept: LAB | Facility: HOSPITAL | Age: 60
End: 2020-10-19

## 2020-10-19 DIAGNOSIS — K57.32 DIVERTICULITIS OF LARGE INTESTINE WITHOUT PERFORATION OR ABSCESS WITHOUT BLEEDING: ICD-10-CM

## 2020-10-19 LAB
ALBUMIN SERPL-MCNC: 3.7 G/DL (ref 3.5–5.2)
ALBUMIN/GLOB SERPL: 1.4 G/DL
ALP SERPL-CCNC: 82 U/L (ref 39–117)
ALT SERPL W P-5'-P-CCNC: 7 U/L (ref 1–33)
ANION GAP SERPL CALCULATED.3IONS-SCNC: 12 MMOL/L (ref 5–15)
AST SERPL-CCNC: 15 U/L (ref 1–32)
BASOPHILS # BLD AUTO: 0.03 10*3/MM3 (ref 0–0.2)
BASOPHILS NFR BLD AUTO: 0.5 % (ref 0–1.5)
BILIRUB SERPL-MCNC: <0.2 MG/DL (ref 0–1.2)
BUN SERPL-MCNC: 12 MG/DL (ref 6–20)
BUN/CREAT SERPL: 11.7 (ref 7–25)
CALCIUM SPEC-SCNC: 9.1 MG/DL (ref 8.6–10.5)
CHLORIDE SERPL-SCNC: 107 MMOL/L (ref 98–107)
CO2 SERPL-SCNC: 25 MMOL/L (ref 22–29)
CREAT SERPL-MCNC: 1.03 MG/DL (ref 0.57–1)
CRP SERPL-MCNC: 0.27 MG/DL (ref 0–0.5)
DEPRECATED RDW RBC AUTO: 50.1 FL (ref 37–54)
EOSINOPHIL # BLD AUTO: 0.08 10*3/MM3 (ref 0–0.4)
EOSINOPHIL NFR BLD AUTO: 1.3 % (ref 0.3–6.2)
ERYTHROCYTE [DISTWIDTH] IN BLOOD BY AUTOMATED COUNT: 14.2 % (ref 12.3–15.4)
ERYTHROCYTE [SEDIMENTATION RATE] IN BLOOD: 20 MM/HR (ref 0–30)
GFR SERPL CREATININE-BSD FRML MDRD: 55 ML/MIN/1.73
GLOBULIN UR ELPH-MCNC: 2.7 GM/DL
GLUCOSE SERPL-MCNC: 47 MG/DL (ref 65–99)
HCT VFR BLD AUTO: 36.7 % (ref 34–46.6)
HGB BLD-MCNC: 11.1 G/DL (ref 12–15.9)
IMM GRANULOCYTES # BLD AUTO: 0.01 10*3/MM3 (ref 0–0.05)
IMM GRANULOCYTES NFR BLD AUTO: 0.2 % (ref 0–0.5)
LYMPHOCYTES # BLD AUTO: 2.36 10*3/MM3 (ref 0.7–3.1)
LYMPHOCYTES NFR BLD AUTO: 38.1 % (ref 19.6–45.3)
MCH RBC QN AUTO: 29.4 PG (ref 26.6–33)
MCHC RBC AUTO-ENTMCNC: 30.2 G/DL (ref 31.5–35.7)
MCV RBC AUTO: 97.1 FL (ref 79–97)
MONOCYTES # BLD AUTO: 0.56 10*3/MM3 (ref 0.1–0.9)
MONOCYTES NFR BLD AUTO: 9 % (ref 5–12)
NEUTROPHILS NFR BLD AUTO: 3.16 10*3/MM3 (ref 1.7–7)
NEUTROPHILS NFR BLD AUTO: 50.9 % (ref 42.7–76)
NRBC BLD AUTO-RTO: 0 /100 WBC (ref 0–0.2)
PLATELET # BLD AUTO: 452 10*3/MM3 (ref 140–450)
PMV BLD AUTO: 11.1 FL (ref 6–12)
POTASSIUM SERPL-SCNC: 4.9 MMOL/L (ref 3.5–5.2)
PROT SERPL-MCNC: 6.4 G/DL (ref 6–8.5)
RBC # BLD AUTO: 3.78 10*6/MM3 (ref 3.77–5.28)
SODIUM SERPL-SCNC: 144 MMOL/L (ref 136–145)
WBC # BLD AUTO: 6.2 10*3/MM3 (ref 3.4–10.8)

## 2020-10-19 PROCEDURE — 85652 RBC SED RATE AUTOMATED: CPT | Performed by: INTERNAL MEDICINE

## 2020-10-19 PROCEDURE — 85025 COMPLETE CBC W/AUTO DIFF WBC: CPT | Performed by: INTERNAL MEDICINE

## 2020-10-19 PROCEDURE — 86140 C-REACTIVE PROTEIN: CPT | Performed by: INTERNAL MEDICINE

## 2020-10-19 PROCEDURE — 80053 COMPREHEN METABOLIC PANEL: CPT | Performed by: INTERNAL MEDICINE

## 2020-10-20 ENCOUNTER — TRANSCRIBE ORDERS (OUTPATIENT)
Dept: ADMINISTRATIVE | Facility: HOSPITAL | Age: 60
End: 2020-10-20

## 2020-10-20 DIAGNOSIS — K57.92 DIVERTICULITIS: Primary | ICD-10-CM

## 2020-10-30 ENCOUNTER — APPOINTMENT (OUTPATIENT)
Dept: PREADMISSION TESTING | Facility: HOSPITAL | Age: 60
End: 2020-10-30

## 2020-10-30 ENCOUNTER — HOSPITAL ENCOUNTER (OUTPATIENT)
Dept: CT IMAGING | Facility: HOSPITAL | Age: 60
Discharge: HOME | End: 2020-10-30

## 2020-10-30 VITALS — BODY MASS INDEX: 30.83 KG/M2 | WEIGHT: 180.6 LBS | HEIGHT: 64 IN

## 2020-10-30 DIAGNOSIS — K57.92 DIVERTICULITIS: ICD-10-CM

## 2020-10-30 LAB
ALBUMIN SERPL-MCNC: 4.2 G/DL (ref 3.5–5.2)
ALBUMIN/GLOB SERPL: 1.6 G/DL
ALP SERPL-CCNC: 93 U/L (ref 39–117)
ALT SERPL W P-5'-P-CCNC: 9 U/L (ref 1–33)
ANION GAP SERPL CALCULATED.3IONS-SCNC: 10 MMOL/L (ref 5–15)
AST SERPL-CCNC: 15 U/L (ref 1–32)
BILIRUB SERPL-MCNC: 0.4 MG/DL (ref 0–1.2)
BUN SERPL-MCNC: 9 MG/DL (ref 6–20)
BUN/CREAT SERPL: 10.6 (ref 7–25)
CALCIUM SPEC-SCNC: 9.9 MG/DL (ref 8.6–10.5)
CHLORIDE SERPL-SCNC: 104 MMOL/L (ref 98–107)
CO2 SERPL-SCNC: 23 MMOL/L (ref 22–29)
CREAT SERPL-MCNC: 0.85 MG/DL (ref 0.57–1)
DEPRECATED RDW RBC AUTO: 44.5 FL (ref 37–54)
ERYTHROCYTE [DISTWIDTH] IN BLOOD BY AUTOMATED COUNT: 13.3 % (ref 12.3–15.4)
GFR SERPL CREATININE-BSD FRML MDRD: 68 ML/MIN/1.73
GLOBULIN UR ELPH-MCNC: 2.6 GM/DL
GLUCOSE SERPL-MCNC: 98 MG/DL (ref 65–99)
HBA1C MFR BLD: 5.1 % (ref 4.8–5.6)
HCT VFR BLD AUTO: 36.1 % (ref 34–46.6)
HGB BLD-MCNC: 11.9 G/DL (ref 12–15.9)
MCH RBC QN AUTO: 29.8 PG (ref 26.6–33)
MCHC RBC AUTO-ENTMCNC: 33 G/DL (ref 31.5–35.7)
MCV RBC AUTO: 90.3 FL (ref 79–97)
PLATELET # BLD AUTO: 330 10*3/MM3 (ref 140–450)
PMV BLD AUTO: 11.3 FL (ref 6–12)
POTASSIUM SERPL-SCNC: 4.7 MMOL/L (ref 3.5–5.2)
PROT SERPL-MCNC: 6.8 G/DL (ref 6–8.5)
QT INTERVAL: 386 MS
QTC INTERVAL: 474 MS
RBC # BLD AUTO: 4 10*6/MM3 (ref 3.77–5.28)
SODIUM SERPL-SCNC: 137 MMOL/L (ref 136–145)
WBC # BLD AUTO: 6.46 10*3/MM3 (ref 3.4–10.8)

## 2020-10-30 PROCEDURE — 83036 HEMOGLOBIN GLYCOSYLATED A1C: CPT | Performed by: COLON & RECTAL SURGERY

## 2020-10-30 PROCEDURE — 25010000002 IOPAMIDOL 61 % SOLUTION: Performed by: COLON & RECTAL SURGERY

## 2020-10-30 PROCEDURE — 93005 ELECTROCARDIOGRAM TRACING: CPT

## 2020-10-30 PROCEDURE — 36415 COLL VENOUS BLD VENIPUNCTURE: CPT

## 2020-10-30 PROCEDURE — 80053 COMPREHEN METABOLIC PANEL: CPT | Performed by: COLON & RECTAL SURGERY

## 2020-10-30 PROCEDURE — 74177 CT ABD & PELVIS W/CONTRAST: CPT

## 2020-10-30 PROCEDURE — 85027 COMPLETE CBC AUTOMATED: CPT | Performed by: COLON & RECTAL SURGERY

## 2020-10-30 PROCEDURE — 93010 ELECTROCARDIOGRAM REPORT: CPT | Performed by: INTERNAL MEDICINE

## 2020-10-30 PROCEDURE — 0 DIATRIZOATE MEGLUMINE & SODIUM PER 1 ML: Performed by: COLON & RECTAL SURGERY

## 2020-10-30 RX ORDER — MELOXICAM 15 MG/1
15 TABLET ORAL EVERY OTHER DAY
COMMUNITY
End: 2020-11-10 | Stop reason: HOSPADM

## 2020-10-30 RX ORDER — AMOXICILLIN AND CLAVULANATE POTASSIUM 875; 125 MG/1; MG/1
1 TABLET, FILM COATED ORAL 2 TIMES DAILY
COMMUNITY
End: 2020-11-10 | Stop reason: HOSPADM

## 2020-10-30 RX ADMIN — IOPAMIDOL 95 ML: 612 INJECTION, SOLUTION INTRAVENOUS at 10:50

## 2020-10-30 RX ADMIN — DIATRIZOATE MEGLUMINE AND DIATRIZOATE SODIUM 15 ML: 660; 100 LIQUID ORAL; RECTAL at 09:40

## 2020-11-01 ENCOUNTER — APPOINTMENT (OUTPATIENT)
Dept: PREADMISSION TESTING | Facility: HOSPITAL | Age: 60
End: 2020-11-01

## 2020-11-01 LAB — SARS-COV-2 RNA RESP QL NAA+PROBE: NOT DETECTED

## 2020-11-01 PROCEDURE — C9803 HOPD COVID-19 SPEC COLLECT: HCPCS

## 2020-11-01 PROCEDURE — U0004 COV-19 TEST NON-CDC HGH THRU: HCPCS

## 2020-11-02 ENCOUNTER — ANESTHESIA EVENT (OUTPATIENT)
Dept: PERIOP | Facility: HOSPITAL | Age: 60
End: 2020-11-02

## 2020-11-02 RX ORDER — FAMOTIDINE 10 MG/ML
20 INJECTION, SOLUTION INTRAVENOUS ONCE
Status: CANCELLED | OUTPATIENT
Start: 2020-11-02 | End: 2020-11-02

## 2020-11-03 ENCOUNTER — ANESTHESIA (OUTPATIENT)
Dept: PERIOP | Facility: HOSPITAL | Age: 60
End: 2020-11-03

## 2020-11-03 ENCOUNTER — HOSPITAL ENCOUNTER (INPATIENT)
Facility: HOSPITAL | Age: 60
LOS: 7 days | Discharge: HOME HEALTH | End: 2020-11-10
Attending: COLON & RECTAL SURGERY | Admitting: INTERNAL MEDICINE

## 2020-11-03 DIAGNOSIS — K57.32 SIGMOID DIVERTICULITIS: ICD-10-CM

## 2020-11-03 DIAGNOSIS — K57.20 COLONIC DIVERTICULAR ABSCESS: Primary | ICD-10-CM

## 2020-11-03 DIAGNOSIS — K57.92 DIVERTICULITIS: ICD-10-CM

## 2020-11-03 LAB
ABO GROUP BLD: NORMAL
BLD GP AB SCN SERPL QL: NEGATIVE
RH BLD: POSITIVE
T&S EXPIRATION DATE: NORMAL

## 2020-11-03 PROCEDURE — 25010000002 NEOSTIGMINE 10 MG/10ML SOLUTION: Performed by: NURSE ANESTHETIST, CERTIFIED REGISTERED

## 2020-11-03 PROCEDURE — 0DTN0ZZ RESECTION OF SIGMOID COLON, OPEN APPROACH: ICD-10-PCS | Performed by: COLON & RECTAL SURGERY

## 2020-11-03 PROCEDURE — 88305 TISSUE EXAM BY PATHOLOGIST: CPT | Performed by: COLON & RECTAL SURGERY

## 2020-11-03 PROCEDURE — 86850 RBC ANTIBODY SCREEN: CPT | Performed by: COLON & RECTAL SURGERY

## 2020-11-03 PROCEDURE — 87075 CULTR BACTERIA EXCEPT BLOOD: CPT | Performed by: COLON & RECTAL SURGERY

## 2020-11-03 PROCEDURE — 25010000002 PIPERACILLIN SOD-TAZOBACTAM PER 1 G: Performed by: INTERNAL MEDICINE

## 2020-11-03 PROCEDURE — 0DBP0ZZ EXCISION OF RECTUM, OPEN APPROACH: ICD-10-PCS | Performed by: COLON & RECTAL SURGERY

## 2020-11-03 PROCEDURE — 25010000002 ERTAPENEM 1 GM/100ML SOLUTION: Performed by: COLON & RECTAL SURGERY

## 2020-11-03 PROCEDURE — 87186 SC STD MICRODIL/AGAR DIL: CPT | Performed by: COLON & RECTAL SURGERY

## 2020-11-03 PROCEDURE — 25010000002 HEPARIN (PORCINE) PER 1000 UNITS: Performed by: COLON & RECTAL SURGERY

## 2020-11-03 PROCEDURE — 25010000002 HYDROMORPHONE PER 4 MG: Performed by: COLON & RECTAL SURGERY

## 2020-11-03 PROCEDURE — 25010000002 ONDANSETRON PER 1 MG: Performed by: COLON & RECTAL SURGERY

## 2020-11-03 PROCEDURE — 25010000002 BUPRENORPHINE PER 0.1 MG: Performed by: NURSE ANESTHETIST, CERTIFIED REGISTERED

## 2020-11-03 PROCEDURE — 25010000002 FENTANYL CITRATE (PF) 100 MCG/2ML SOLUTION: Performed by: NURSE ANESTHETIST, CERTIFIED REGISTERED

## 2020-11-03 PROCEDURE — 87077 CULTURE AEROBIC IDENTIFY: CPT | Performed by: COLON & RECTAL SURGERY

## 2020-11-03 PROCEDURE — 25010000002 HYDROMORPHONE PER 4 MG: Performed by: ANESTHESIOLOGY

## 2020-11-03 PROCEDURE — 86900 BLOOD TYPING SEROLOGIC ABO: CPT | Performed by: COLON & RECTAL SURGERY

## 2020-11-03 PROCEDURE — 88302 TISSUE EXAM BY PATHOLOGIST: CPT | Performed by: COLON & RECTAL SURGERY

## 2020-11-03 PROCEDURE — 25810000003 SODIUM CHLORIDE 0.9 % WITH KCL 20 MEQ 20-0.9 MEQ/L-% SOLUTION: Performed by: COLON & RECTAL SURGERY

## 2020-11-03 PROCEDURE — 0D1B0Z4 BYPASS ILEUM TO CUTANEOUS, OPEN APPROACH: ICD-10-PCS | Performed by: COLON & RECTAL SURGERY

## 2020-11-03 PROCEDURE — 25010000003 LIDOCAINE 1 % SOLUTION: Performed by: NURSE ANESTHETIST, CERTIFIED REGISTERED

## 2020-11-03 PROCEDURE — 86901 BLOOD TYPING SEROLOGIC RH(D): CPT | Performed by: COLON & RECTAL SURGERY

## 2020-11-03 PROCEDURE — 87070 CULTURE OTHR SPECIMN AEROBIC: CPT | Performed by: COLON & RECTAL SURGERY

## 2020-11-03 PROCEDURE — 0T788DZ DILATION OF BILATERAL URETERS WITH INTRALUMINAL DEVICE, VIA NATURAL OR ARTIFICIAL OPENING ENDOSCOPIC: ICD-10-PCS | Performed by: UROLOGY

## 2020-11-03 PROCEDURE — 25010000002 KETOROLAC TROMETHAMINE PER 15 MG: Performed by: COLON & RECTAL SURGERY

## 2020-11-03 PROCEDURE — 88307 TISSUE EXAM BY PATHOLOGIST: CPT | Performed by: COLON & RECTAL SURGERY

## 2020-11-03 PROCEDURE — 25010000002 FENTANYL CITRATE (PF) 100 MCG/2ML SOLUTION: Performed by: ANESTHESIOLOGY

## 2020-11-03 PROCEDURE — 25010000002 PROPOFOL 10 MG/ML EMULSION: Performed by: NURSE ANESTHETIST, CERTIFIED REGISTERED

## 2020-11-03 PROCEDURE — 87205 SMEAR GRAM STAIN: CPT | Performed by: COLON & RECTAL SURGERY

## 2020-11-03 PROCEDURE — 0DTJ0ZZ RESECTION OF APPENDIX, OPEN APPROACH: ICD-10-PCS | Performed by: COLON & RECTAL SURGERY

## 2020-11-03 PROCEDURE — 99223 1ST HOSP IP/OBS HIGH 75: CPT | Performed by: INTERNAL MEDICINE

## 2020-11-03 PROCEDURE — 25010000002 ONDANSETRON PER 1 MG: Performed by: NURSE ANESTHETIST, CERTIFIED REGISTERED

## 2020-11-03 PROCEDURE — 25010000002 DEXAMETHASONE PER 1 MG: Performed by: NURSE ANESTHETIST, CERTIFIED REGISTERED

## 2020-11-03 PROCEDURE — 25010000002 DEXAMETHASONE SODIUM PHOSPHATE 10 MG/ML SOLUTION: Performed by: NURSE ANESTHETIST, CERTIFIED REGISTERED

## 2020-11-03 DEVICE — FLOSEAL HEMOSTATIC MATRIX, 10ML
Type: IMPLANTABLE DEVICE | Site: ABDOMEN | Status: FUNCTIONAL
Brand: FLOSEAL HEMOSTATIC MATRIX

## 2020-11-03 DEVICE — THE ECHELON, ECHELON ENDOPATH™ AND ECHELON FLEX™ FAMILIES OF ENDOSCOPIC LINEAR CUTTERS AND RELOADS ARE STERILE, SINGLE PATIENT USE INSTRUMENTS THAT SIMULTANEOUSLY CUT AND STAPLE TISSUE. THERE ARE SIX STAGGERED ROWS OF STAPLES, THREE ON EITHER SIDE OF THE CUT LINE. THE 45 MM INSTRUMENTS HAVE A STAPLE LINE THATIS APPROXIMATELY 45 MM LONG AND A CUT LINE THAT IS APPROXIMATELY 42 MM LONG. THE SHAFT CAN ROTATE FREELY IN BOTH DIRECTIONS AND AN ARTICULATION MECHANISM ON ARTICULATING INSTRUMENTS ENABLES BENDING THE DISTAL PORTIONOF THE SHAFT TO FACILITATE LATERAL ACCESS OF THE OPERATIVE SITE.THE INSTRUMENTS ARE SHIPPED WITHOUT A RELOAD AND MUST BE LOADED PRIOR TO USE. A STAPLE RETAINING CAP ON THE RELOAD PROTECTS THE STAPLE LEG POINTS DURING SHIPPING AND TRANSPORTATION. THE INSTRUMENTS’ LOCK-OUT FEATURE IS DESIGNED TO PREVENT A USED RELOAD FROM BEING REFIRED.
Type: IMPLANTABLE DEVICE | Site: ABDOMEN | Status: FUNCTIONAL
Brand: ECHELON ENDOPATH

## 2020-11-03 DEVICE — CIRCULAR STAPLER WITH DST SERIES TECHNOLOGY
Type: IMPLANTABLE DEVICE | Site: ABDOMEN | Status: FUNCTIONAL
Brand: EEA

## 2020-11-03 RX ORDER — MAGNESIUM SULFATE HEPTAHYDRATE 40 MG/ML
2 INJECTION, SOLUTION INTRAVENOUS AS NEEDED
Status: DISCONTINUED | OUTPATIENT
Start: 2020-11-03 | End: 2020-11-10 | Stop reason: HOSPADM

## 2020-11-03 RX ORDER — SODIUM CHLORIDE, SODIUM LACTATE, POTASSIUM CHLORIDE, CALCIUM CHLORIDE 600; 310; 30; 20 MG/100ML; MG/100ML; MG/100ML; MG/100ML
9 INJECTION, SOLUTION INTRAVENOUS CONTINUOUS
Status: DISCONTINUED | OUTPATIENT
Start: 2020-11-03 | End: 2020-11-04

## 2020-11-03 RX ORDER — CITALOPRAM 40 MG/1
40 TABLET ORAL DAILY
Status: DISCONTINUED | OUTPATIENT
Start: 2020-11-03 | End: 2020-11-10 | Stop reason: HOSPADM

## 2020-11-03 RX ORDER — FAMOTIDINE 20 MG/1
20 TABLET, FILM COATED ORAL 2 TIMES DAILY
Status: DISCONTINUED | OUTPATIENT
Start: 2020-11-03 | End: 2020-11-10 | Stop reason: HOSPADM

## 2020-11-03 RX ORDER — BUPRENORPHINE HYDROCHLORIDE 0.32 MG/ML
INJECTION INTRAMUSCULAR; INTRAVENOUS AS NEEDED
Status: DISCONTINUED | OUTPATIENT
Start: 2020-11-03 | End: 2020-11-03 | Stop reason: SURG

## 2020-11-03 RX ORDER — DEXAMETHASONE SODIUM PHOSPHATE 10 MG/ML
INJECTION, SOLUTION INTRAMUSCULAR; INTRAVENOUS AS NEEDED
Status: DISCONTINUED | OUTPATIENT
Start: 2020-11-03 | End: 2020-11-03 | Stop reason: SURG

## 2020-11-03 RX ORDER — SCOLOPAMINE TRANSDERMAL SYSTEM 1 MG/1
1 PATCH, EXTENDED RELEASE TRANSDERMAL ONCE
Status: COMPLETED | OUTPATIENT
Start: 2020-11-03 | End: 2020-11-06

## 2020-11-03 RX ORDER — SODIUM CHLORIDE AND POTASSIUM CHLORIDE 150; 900 MG/100ML; MG/100ML
100 INJECTION, SOLUTION INTRAVENOUS CONTINUOUS
Status: DISCONTINUED | OUTPATIENT
Start: 2020-11-03 | End: 2020-11-04

## 2020-11-03 RX ORDER — FENTANYL CITRATE 50 UG/ML
50 INJECTION, SOLUTION INTRAMUSCULAR; INTRAVENOUS
Status: DISCONTINUED | OUTPATIENT
Start: 2020-11-03 | End: 2020-11-03 | Stop reason: HOSPADM

## 2020-11-03 RX ORDER — HYDROMORPHONE HYDROCHLORIDE 1 MG/ML
0.5 INJECTION, SOLUTION INTRAMUSCULAR; INTRAVENOUS; SUBCUTANEOUS
Status: DISCONTINUED | OUTPATIENT
Start: 2020-11-03 | End: 2020-11-03

## 2020-11-03 RX ORDER — SODIUM CHLORIDE 0.9 % (FLUSH) 0.9 %
10 SYRINGE (ML) INJECTION EVERY 12 HOURS SCHEDULED
Status: DISCONTINUED | OUTPATIENT
Start: 2020-11-03 | End: 2020-11-03 | Stop reason: HOSPADM

## 2020-11-03 RX ORDER — BUPIVACAINE HYDROCHLORIDE 2.5 MG/ML
INJECTION, SOLUTION EPIDURAL; INFILTRATION; INTRACAUDAL AS NEEDED
Status: DISCONTINUED | OUTPATIENT
Start: 2020-11-03 | End: 2020-11-03 | Stop reason: SURG

## 2020-11-03 RX ORDER — HEPARIN SODIUM 5000 [USP'U]/ML
5000 INJECTION, SOLUTION INTRAVENOUS; SUBCUTANEOUS ONCE
Status: COMPLETED | OUTPATIENT
Start: 2020-11-03 | End: 2020-11-03

## 2020-11-03 RX ORDER — HYDROCODONE BITARTRATE AND ACETAMINOPHEN 7.5; 325 MG/1; MG/1
1 TABLET ORAL EVERY 4 HOURS PRN
Status: DISCONTINUED | OUTPATIENT
Start: 2020-11-03 | End: 2020-11-10 | Stop reason: HOSPADM

## 2020-11-03 RX ORDER — HYDROCODONE BITARTRATE AND ACETAMINOPHEN 5; 325 MG/1; MG/1
1 TABLET ORAL EVERY 4 HOURS PRN
Status: DISCONTINUED | OUTPATIENT
Start: 2020-11-03 | End: 2020-11-03

## 2020-11-03 RX ORDER — L.ACID,PARA/B.BIFIDUM/S.THERM 8B CELL
1 CAPSULE ORAL DAILY
Status: DISCONTINUED | OUTPATIENT
Start: 2020-11-03 | End: 2020-11-10 | Stop reason: HOSPADM

## 2020-11-03 RX ORDER — HYDROMORPHONE HYDROCHLORIDE 1 MG/ML
0.5 INJECTION, SOLUTION INTRAMUSCULAR; INTRAVENOUS; SUBCUTANEOUS
Status: DISCONTINUED | OUTPATIENT
Start: 2020-11-03 | End: 2020-11-03 | Stop reason: HOSPADM

## 2020-11-03 RX ORDER — MAGNESIUM HYDROXIDE 1200 MG/15ML
LIQUID ORAL AS NEEDED
Status: DISCONTINUED | OUTPATIENT
Start: 2020-11-03 | End: 2020-11-03 | Stop reason: HOSPADM

## 2020-11-03 RX ORDER — KETOROLAC TROMETHAMINE 15 MG/ML
15 INJECTION, SOLUTION INTRAMUSCULAR; INTRAVENOUS EVERY 6 HOURS
Status: COMPLETED | OUTPATIENT
Start: 2020-11-03 | End: 2020-11-05

## 2020-11-03 RX ORDER — NALOXONE HCL 0.4 MG/ML
0.4 VIAL (ML) INJECTION
Status: DISCONTINUED | OUTPATIENT
Start: 2020-11-03 | End: 2020-11-10 | Stop reason: HOSPADM

## 2020-11-03 RX ORDER — MAGNESIUM SULFATE HEPTAHYDRATE 40 MG/ML
4 INJECTION, SOLUTION INTRAVENOUS AS NEEDED
Status: DISCONTINUED | OUTPATIENT
Start: 2020-11-03 | End: 2020-11-10 | Stop reason: HOSPADM

## 2020-11-03 RX ORDER — GLYCOPYRROLATE 0.2 MG/ML
INJECTION INTRAMUSCULAR; INTRAVENOUS AS NEEDED
Status: DISCONTINUED | OUTPATIENT
Start: 2020-11-03 | End: 2020-11-03 | Stop reason: SURG

## 2020-11-03 RX ORDER — SODIUM CHLORIDE, SODIUM LACTATE, POTASSIUM CHLORIDE, CALCIUM CHLORIDE 600; 310; 30; 20 MG/100ML; MG/100ML; MG/100ML; MG/100ML
INJECTION, SOLUTION INTRAVENOUS CONTINUOUS PRN
Status: DISCONTINUED | OUTPATIENT
Start: 2020-11-03 | End: 2020-11-03 | Stop reason: SURG

## 2020-11-03 RX ORDER — GABAPENTIN 300 MG/1
300 CAPSULE ORAL 3 TIMES DAILY
Status: DISPENSED | OUTPATIENT
Start: 2020-11-03 | End: 2020-11-06

## 2020-11-03 RX ORDER — ONDANSETRON 2 MG/ML
INJECTION INTRAMUSCULAR; INTRAVENOUS AS NEEDED
Status: DISCONTINUED | OUTPATIENT
Start: 2020-11-03 | End: 2020-11-03 | Stop reason: SURG

## 2020-11-03 RX ORDER — ACETAMINOPHEN 325 MG/1
650 TABLET ORAL EVERY 8 HOURS
Status: DISCONTINUED | OUTPATIENT
Start: 2020-11-03 | End: 2020-11-10 | Stop reason: HOSPADM

## 2020-11-03 RX ORDER — FENTANYL CITRATE 50 UG/ML
INJECTION, SOLUTION INTRAMUSCULAR; INTRAVENOUS AS NEEDED
Status: DISCONTINUED | OUTPATIENT
Start: 2020-11-03 | End: 2020-11-03 | Stop reason: SURG

## 2020-11-03 RX ORDER — HYDROMORPHONE HYDROCHLORIDE 1 MG/ML
0.5 INJECTION, SOLUTION INTRAMUSCULAR; INTRAVENOUS; SUBCUTANEOUS
Status: DISPENSED | OUTPATIENT
Start: 2020-11-03 | End: 2020-11-10

## 2020-11-03 RX ORDER — SODIUM CHLORIDE 9 MG/ML
INJECTION, SOLUTION INTRAVENOUS AS NEEDED
Status: DISCONTINUED | OUTPATIENT
Start: 2020-11-03 | End: 2020-11-03 | Stop reason: HOSPADM

## 2020-11-03 RX ORDER — HEPARIN SODIUM 5000 [USP'U]/ML
5000 INJECTION, SOLUTION INTRAVENOUS; SUBCUTANEOUS EVERY 8 HOURS SCHEDULED
Status: DISCONTINUED | OUTPATIENT
Start: 2020-11-04 | End: 2020-11-10 | Stop reason: HOSPADM

## 2020-11-03 RX ORDER — PROPOFOL 10 MG/ML
VIAL (ML) INTRAVENOUS AS NEEDED
Status: DISCONTINUED | OUTPATIENT
Start: 2020-11-03 | End: 2020-11-03 | Stop reason: SURG

## 2020-11-03 RX ORDER — POTASSIUM CHLORIDE 7.45 MG/ML
10 INJECTION INTRAVENOUS
Status: DISCONTINUED | OUTPATIENT
Start: 2020-11-03 | End: 2020-11-10 | Stop reason: HOSPADM

## 2020-11-03 RX ORDER — ONDANSETRON 4 MG/1
4 TABLET, FILM COATED ORAL EVERY 6 HOURS PRN
Status: DISCONTINUED | OUTPATIENT
Start: 2020-11-03 | End: 2020-11-10 | Stop reason: HOSPADM

## 2020-11-03 RX ORDER — MELOXICAM 15 MG/1
15 TABLET ORAL ONCE
Status: COMPLETED | OUTPATIENT
Start: 2020-11-03 | End: 2020-11-03

## 2020-11-03 RX ORDER — ONDANSETRON 2 MG/ML
4 INJECTION INTRAMUSCULAR; INTRAVENOUS EVERY 6 HOURS PRN
Status: DISCONTINUED | OUTPATIENT
Start: 2020-11-03 | End: 2020-11-10 | Stop reason: HOSPADM

## 2020-11-03 RX ORDER — POTASSIUM CHLORIDE 1.5 G/1.77G
40 POWDER, FOR SOLUTION ORAL AS NEEDED
Status: DISCONTINUED | OUTPATIENT
Start: 2020-11-03 | End: 2020-11-10 | Stop reason: HOSPADM

## 2020-11-03 RX ORDER — DEXAMETHASONE SODIUM PHOSPHATE 4 MG/ML
INJECTION, SOLUTION INTRA-ARTICULAR; INTRALESIONAL; INTRAMUSCULAR; INTRAVENOUS; SOFT TISSUE AS NEEDED
Status: DISCONTINUED | OUTPATIENT
Start: 2020-11-03 | End: 2020-11-03 | Stop reason: SURG

## 2020-11-03 RX ORDER — NALOXONE HCL 0.4 MG/ML
0.4 VIAL (ML) INJECTION
Status: DISCONTINUED | OUTPATIENT
Start: 2020-11-03 | End: 2020-11-03

## 2020-11-03 RX ORDER — POTASSIUM CHLORIDE 750 MG/1
40 CAPSULE, EXTENDED RELEASE ORAL AS NEEDED
Status: DISCONTINUED | OUTPATIENT
Start: 2020-11-03 | End: 2020-11-10 | Stop reason: HOSPADM

## 2020-11-03 RX ORDER — ALVIMOPAN 12 MG/1
12 CAPSULE ORAL ONCE
Status: COMPLETED | OUTPATIENT
Start: 2020-11-03 | End: 2020-11-03

## 2020-11-03 RX ORDER — SODIUM CHLORIDE 0.9 % (FLUSH) 0.9 %
10 SYRINGE (ML) INJECTION EVERY 12 HOURS SCHEDULED
Status: DISCONTINUED | OUTPATIENT
Start: 2020-11-03 | End: 2020-11-10 | Stop reason: HOSPADM

## 2020-11-03 RX ORDER — DIAZEPAM 5 MG/1
5 TABLET ORAL EVERY 6 HOURS PRN
Status: DISPENSED | OUTPATIENT
Start: 2020-11-03 | End: 2020-11-10

## 2020-11-03 RX ORDER — ROCURONIUM BROMIDE 10 MG/ML
INJECTION, SOLUTION INTRAVENOUS AS NEEDED
Status: DISCONTINUED | OUTPATIENT
Start: 2020-11-03 | End: 2020-11-03 | Stop reason: SURG

## 2020-11-03 RX ORDER — LIDOCAINE HYDROCHLORIDE 10 MG/ML
0.5 INJECTION, SOLUTION EPIDURAL; INFILTRATION; INTRACAUDAL; PERINEURAL ONCE AS NEEDED
Status: COMPLETED | OUTPATIENT
Start: 2020-11-03 | End: 2020-11-03

## 2020-11-03 RX ORDER — SODIUM CHLORIDE 9 MG/ML
100 INJECTION, SOLUTION INTRAVENOUS CONTINUOUS
Status: DISCONTINUED | OUTPATIENT
Start: 2020-11-03 | End: 2020-11-03

## 2020-11-03 RX ORDER — SODIUM CHLORIDE 0.9 % (FLUSH) 0.9 %
10 SYRINGE (ML) INJECTION AS NEEDED
Status: DISCONTINUED | OUTPATIENT
Start: 2020-11-03 | End: 2020-11-10 | Stop reason: HOSPADM

## 2020-11-03 RX ORDER — ACETAMINOPHEN 500 MG
1000 TABLET ORAL ONCE
Status: COMPLETED | OUTPATIENT
Start: 2020-11-03 | End: 2020-11-03

## 2020-11-03 RX ORDER — NEOSTIGMINE METHYLSULFATE 1 MG/ML
INJECTION, SOLUTION INTRAVENOUS AS NEEDED
Status: DISCONTINUED | OUTPATIENT
Start: 2020-11-03 | End: 2020-11-03 | Stop reason: SURG

## 2020-11-03 RX ORDER — FAMOTIDINE 20 MG/1
20 TABLET, FILM COATED ORAL ONCE
Status: COMPLETED | OUTPATIENT
Start: 2020-11-03 | End: 2020-11-03

## 2020-11-03 RX ORDER — MELATONIN
2000 DAILY
COMMUNITY

## 2020-11-03 RX ORDER — ONDANSETRON 2 MG/ML
4 INJECTION INTRAMUSCULAR; INTRAVENOUS EVERY 6 HOURS PRN
Status: DISCONTINUED | OUTPATIENT
Start: 2020-11-03 | End: 2020-11-06 | Stop reason: SDUPTHER

## 2020-11-03 RX ORDER — SODIUM CHLORIDE 0.9 % (FLUSH) 0.9 %
10 SYRINGE (ML) INJECTION AS NEEDED
Status: DISCONTINUED | OUTPATIENT
Start: 2020-11-03 | End: 2020-11-03 | Stop reason: HOSPADM

## 2020-11-03 RX ORDER — PREGABALIN 75 MG/1
75 CAPSULE ORAL ONCE
Status: COMPLETED | OUTPATIENT
Start: 2020-11-03 | End: 2020-11-03

## 2020-11-03 RX ORDER — LIDOCAINE HYDROCHLORIDE 10 MG/ML
INJECTION, SOLUTION INFILTRATION; PERINEURAL AS NEEDED
Status: DISCONTINUED | OUTPATIENT
Start: 2020-11-03 | End: 2020-11-03 | Stop reason: SURG

## 2020-11-03 RX ADMIN — EPHEDRINE SULFATE 10 MG: 50 INJECTION INTRAMUSCULAR; INTRAVENOUS; SUBCUTANEOUS at 08:39

## 2020-11-03 RX ADMIN — FENTANYL CITRATE 50 MCG: 0.05 INJECTION, SOLUTION INTRAMUSCULAR; INTRAVENOUS at 11:03

## 2020-11-03 RX ADMIN — PREGABALIN 75 MG: 75 CAPSULE ORAL at 07:12

## 2020-11-03 RX ADMIN — LIDOCAINE HYDROCHLORIDE 0.2 ML: 10 INJECTION, SOLUTION EPIDURAL; INFILTRATION; INTRACAUDAL; PERINEURAL at 07:15

## 2020-11-03 RX ADMIN — ROCURONIUM BROMIDE 10 MG: 10 INJECTION INTRAVENOUS at 08:05

## 2020-11-03 RX ADMIN — FENTANYL CITRATE 50 MCG: 0.05 INJECTION, SOLUTION INTRAMUSCULAR; INTRAVENOUS at 10:47

## 2020-11-03 RX ADMIN — SODIUM CHLORIDE, POTASSIUM CHLORIDE, SODIUM LACTATE AND CALCIUM CHLORIDE 9 ML/HR: 600; 310; 30; 20 INJECTION, SOLUTION INTRAVENOUS at 07:15

## 2020-11-03 RX ADMIN — MELOXICAM 15 MG: 15 TABLET ORAL at 07:12

## 2020-11-03 RX ADMIN — HYDROCODONE BITARTRATE AND ACETAMINOPHEN 1 TABLET: 7.5; 325 TABLET ORAL at 16:17

## 2020-11-03 RX ADMIN — ROCURONIUM BROMIDE 30 MG: 10 INJECTION INTRAVENOUS at 07:34

## 2020-11-03 RX ADMIN — DEXAMETHASONE SODIUM PHOSPHATE 6 MG: 10 INJECTION INTRAMUSCULAR; INTRAVENOUS at 07:43

## 2020-11-03 RX ADMIN — HEPARIN SODIUM 5000 UNITS: 5000 INJECTION, SOLUTION INTRAVENOUS; SUBCUTANEOUS at 07:20

## 2020-11-03 RX ADMIN — PROPOFOL 200 MG: 10 INJECTION, EMULSION INTRAVENOUS at 07:34

## 2020-11-03 RX ADMIN — GABAPENTIN 300 MG: 300 CAPSULE ORAL at 15:51

## 2020-11-03 RX ADMIN — GLYCOPYRROLATE 0.4 MG: 0.2 INJECTION INTRAMUSCULAR; INTRAVENOUS at 10:19

## 2020-11-03 RX ADMIN — SODIUM CHLORIDE, POTASSIUM CHLORIDE, SODIUM LACTATE AND CALCIUM CHLORIDE: 600; 310; 30; 20 INJECTION, SOLUTION INTRAVENOUS at 07:30

## 2020-11-03 RX ADMIN — DIAZEPAM 5 MG: 5 TABLET ORAL at 11:13

## 2020-11-03 RX ADMIN — FENTANYL CITRATE 50 MCG: 0.05 INJECTION, SOLUTION INTRAMUSCULAR; INTRAVENOUS at 10:36

## 2020-11-03 RX ADMIN — ERTAPENEM SODIUM 1 G: 1 INJECTION, POWDER, LYOPHILIZED, FOR SOLUTION INTRAMUSCULAR; INTRAVENOUS at 07:30

## 2020-11-03 RX ADMIN — SODIUM CHLORIDE, POTASSIUM CHLORIDE, SODIUM LACTATE AND CALCIUM CHLORIDE: 600; 310; 30; 20 INJECTION, SOLUTION INTRAVENOUS at 09:25

## 2020-11-03 RX ADMIN — ACETAMINOPHEN 650 MG: 325 TABLET, FILM COATED ORAL at 13:45

## 2020-11-03 RX ADMIN — HYDROMORPHONE HYDROCHLORIDE 0.5 MG: 1 INJECTION, SOLUTION INTRAMUSCULAR; INTRAVENOUS; SUBCUTANEOUS at 11:48

## 2020-11-03 RX ADMIN — NEOSTIGMINE 3 MG: 1 INJECTION INTRAVENOUS at 10:19

## 2020-11-03 RX ADMIN — EPHEDRINE SULFATE 5 MG: 50 INJECTION INTRAMUSCULAR; INTRAVENOUS; SUBCUTANEOUS at 07:52

## 2020-11-03 RX ADMIN — FENTANYL CITRATE 50 MCG: 0.05 INJECTION, SOLUTION INTRAMUSCULAR; INTRAVENOUS at 11:14

## 2020-11-03 RX ADMIN — HYDROMORPHONE HYDROCHLORIDE 0.5 MG: 1 INJECTION, SOLUTION INTRAMUSCULAR; INTRAVENOUS; SUBCUTANEOUS at 10:51

## 2020-11-03 RX ADMIN — SCOPALAMINE 1 PATCH: 1 PATCH, EXTENDED RELEASE TRANSDERMAL at 07:12

## 2020-11-03 RX ADMIN — TAZOBACTAM SODIUM AND PIPERACILLIN SODIUM 3.38 G: 375; 3 INJECTION, SOLUTION INTRAVENOUS at 21:38

## 2020-11-03 RX ADMIN — ALVIMOPAN 12 MG: 12 CAPSULE ORAL at 07:12

## 2020-11-03 RX ADMIN — ACETAMINOPHEN 1000 MG: 500 TABLET ORAL at 07:12

## 2020-11-03 RX ADMIN — BUPRENORPHINE HYDROCHLORIDE 300 MCG: 0.32 INJECTION INTRAMUSCULAR; INTRAVENOUS at 07:36

## 2020-11-03 RX ADMIN — FAMOTIDINE 20 MG: 20 TABLET, FILM COATED ORAL at 21:38

## 2020-11-03 RX ADMIN — GABAPENTIN 300 MG: 300 CAPSULE ORAL at 21:38

## 2020-11-03 RX ADMIN — DEXAMETHASONE SODIUM PHOSPHATE 4 MG: 10 INJECTION INTRAMUSCULAR; INTRAVENOUS at 07:36

## 2020-11-03 RX ADMIN — ACETAMINOPHEN 650 MG: 325 TABLET, FILM COATED ORAL at 21:38

## 2020-11-03 RX ADMIN — KETOROLAC TROMETHAMINE 15 MG: 15 INJECTION, SOLUTION INTRAMUSCULAR; INTRAVENOUS at 11:02

## 2020-11-03 RX ADMIN — FAMOTIDINE 20 MG: 20 TABLET, FILM COATED ORAL at 07:11

## 2020-11-03 RX ADMIN — HYDROMORPHONE HYDROCHLORIDE 0.5 MG: 1 INJECTION, SOLUTION INTRAMUSCULAR; INTRAVENOUS; SUBCUTANEOUS at 13:45

## 2020-11-03 RX ADMIN — HYDROMORPHONE HYDROCHLORIDE 0.5 MG: 1 INJECTION, SOLUTION INTRAMUSCULAR; INTRAVENOUS; SUBCUTANEOUS at 10:34

## 2020-11-03 RX ADMIN — ONDANSETRON 4 MG: 2 INJECTION INTRAMUSCULAR; INTRAVENOUS at 10:19

## 2020-11-03 RX ADMIN — KETOROLAC TROMETHAMINE 15 MG: 15 INJECTION, SOLUTION INTRAMUSCULAR; INTRAVENOUS at 16:17

## 2020-11-03 RX ADMIN — POTASSIUM CHLORIDE AND SODIUM CHLORIDE 100 ML/HR: 900; 150 INJECTION, SOLUTION INTRAVENOUS at 21:38

## 2020-11-03 RX ADMIN — PROPOFOL 25 MCG/KG/MIN: 10 INJECTION, EMULSION INTRAVENOUS at 07:39

## 2020-11-03 RX ADMIN — FENTANYL CITRATE 100 MCG: 50 INJECTION, SOLUTION INTRAMUSCULAR; INTRAVENOUS at 07:34

## 2020-11-03 RX ADMIN — BUPIVACAINE HYDROCHLORIDE 60 ML: 2.5 INJECTION, SOLUTION EPIDURAL; INFILTRATION; INTRACAUDAL; PERINEURAL at 07:36

## 2020-11-03 RX ADMIN — TAZOBACTAM SODIUM AND PIPERACILLIN SODIUM 3.38 G: 375; 3 INJECTION, SOLUTION INTRAVENOUS at 15:49

## 2020-11-03 RX ADMIN — ONDANSETRON HYDROCHLORIDE 4 MG: 2 SOLUTION INTRAMUSCULAR; INTRAVENOUS at 18:25

## 2020-11-03 RX ADMIN — LIDOCAINE HYDROCHLORIDE 50 MG: 10 INJECTION, SOLUTION INFILTRATION; PERINEURAL at 07:34

## 2020-11-03 RX ADMIN — DEXAMETHASONE SODIUM PHOSPHATE 4 MG: 4 INJECTION, SOLUTION INTRAMUSCULAR; INTRAVENOUS at 07:43

## 2020-11-03 RX ADMIN — POTASSIUM CHLORIDE AND SODIUM CHLORIDE 100 ML/HR: 900; 150 INJECTION, SOLUTION INTRAVENOUS at 11:48

## 2020-11-03 NOTE — ANESTHESIA POSTPROCEDURE EVALUATION
Patient: Daja Hubbard    Procedure Summary     Date: 11/03/20 Room / Location:  [facility] /  [facility]    Anesthesia Start: 0730 Anesthesia Stop:     Procedures:       LAPROSCOPIC LOWER ANTERIOR RESECTION, LOOP ILEOSTOMY CREATION, MOBOLIZATION OF SPLENIC FLEXURE, APPENDECTOMY (N/A Abdomen)      CYSTOSCOPY URETERAL CATHETER/STENT INSERTION (Bilateral ) Diagnosis:     Surgeon: Kojo Barney MD Provider: Khang Smith MD    Anesthesia Type: general ASA Status: 3          Anesthesia Type: general    Vitals  No vitals data found for the desired time range.    97.7  95/77  100%  71      Post Anesthesia Care and Evaluation    Patient location during evaluation: PACU  Patient participation: complete - patient participated  Level of consciousness: awake and alert  Pain score: 0  Pain management: adequate  Airway patency: patent  Anesthetic complications: No anesthetic complications  PONV Status: none  Cardiovascular status: hemodynamically stable and acceptable  Respiratory status: nonlabored ventilation, acceptable and nasal cannula  Hydration status: acceptable

## 2020-11-03 NOTE — NURSING NOTE
Stoma marking completed:     Marked in both her RUQ and RLQ.   Patient to have a low anterior resection per Dr. Barney this AM.   WOC will follow daily for stomal support in the event a diverting ostomy is needed.   Questions answered and encouragement provided.     Thanks

## 2020-11-03 NOTE — ANESTHESIA PROCEDURE NOTES
TAPS      Patient reassessed immediately prior to procedure    Patient location during procedure: OR  Reason for block: at surgeon's request and post-op pain management  Performed by  CRNA: Kenton Mendoza CRNA  Preanesthetic Checklist  Completed: patient identified, site marked, surgical consent, pre-op evaluation, timeout performed, IV checked, risks and benefits discussed and monitors and equipment checked  Prep:  Pt Position: supine  Sterile barriers:cap, gloves, sterile barriers and mask  Prep: ChloraPrep  Patient monitoring: blood pressure monitoring, continuous pulse oximetry and EKG  Procedure  Sedation:yes  Performed under: general  Guidance:ultrasound guided  Images:still images not obtained    Laterality:Bilateral  Block Type:TAP  Injection Technique:single-shot  Needle Type:short-bevel and echogenic  Needle Gauge:20 G  Resistance on Injection: none          Medications  Comment:Block Injection:  LA dose divided between Right and Left block        Post Assessment  Injection Assessment: negative aspiration for heme, incremental injection and no paresthesia on injection  Patient Tolerance:comfortable throughout block  Complications:no  Additional Notes      Under Ultrasound guidance, a BBraun 4inch 360 degree needle was advanced with Normal Saline hydro dissection of tissue.  The Internal Oblique and Transversus Abdominus muscles where visualized.  At or before the aponeurosis of Internal Oblique, local anesthetic spread was visualized in the Transversus Abdominus Plane. Injection was made incrementally with aspiration every 5 mls.  There was no  intravascular injection,  injection pressure was normal, there was no neural injection, and the procedure was completed without difficulty.  Thank You.

## 2020-11-03 NOTE — CONSULTS
[facility]    INPATIENT CONSULT NOTE / INITIAL HOSPITAL VISIT      PATIENT NAME:  Daja Hubbard  YOB: 1960  MEDICAL RECORD NUMBER:  [id number]    Date of Admission:  11/3/2020  Date of Consult: 11/3/2020    Requesting Provider: LEANA Banks DO  Evaluating Physician: Osmar Arias MD      Reason for Consultation:   Complicated diverticulitis with abscess    History of Present Illness:  Patient is a 60 y.o. female with a past medical history significant for recurrent diverticulitis who was admitted to [facility] on 11/3/2020 after presenting for planned colon surgery.  I have seen the patient on multiple occasions in the past for recurrent diverticulitis.  Most recently, she was admitted in early October with recurrent uncomplicated diverticulitis.  She had significant, prolonged pain in the left lower quadrant with fever and leukocytosis.  However, repeated CT imaging was negative for perforation or abscess.  She was initially discharged home with PICC and IV ertapenem therapy.  She completed 2 weeks of that and I then transitioned her over to oral Augmentin, which she completed a couple of days ago.  Overall her symptoms have significantly improved.  Dr. Barney arranged for her to have elective colon resection surgery today.  Findings were significant for ongoing inflammation and contained perforation with abscess in the left lower quadrant.  She underwent drainage of the abscess and cultures were obtained.    He performed a low anterior resection with associated appendectomy and loop ileostomy.  She has been given 1 dose of ertapenem.    I have been consulted to assist in workup and antimicrobial management of complicated diverticulitis with abscess.    Past Medical History:   Diagnosis Date   • Anemia    • Cervical high risk human papillomavirus (HPV) DNA test positive 09/23/2015    NON 16/18 POSITIVE   • Cervical stricture or stenosis     cervical  stricture and stenosis   • Depression    • Diverticulitis    • Fibromyalgia    • History of kidney stones    • Hypertension    • Low grade squamous intraepithelial lesion (LGSIL) on cervical Pap smear 2020    colposcopy 2020 with BX at 6 and ECC; unsatisfactory; thick WE extending inside OS   • Mild dysplasia of cervix     (DEV I)   • Mild dysplasia of cervix 2020    ECC positive LGSIL; colposcopy unsatisfactory with WE extending inside os.   • Mild dysplasia of cervix (DEV I) 2020   • Recurrent UTI    • Tobacco dependence    • Urge incontinence     has a bladder stimulator       Past Surgical History:   Procedure Laterality Date   • BLADDER SURGERY     •  SECTION     • COLONOSCOPY     • COLPOSCOPY  2015   • HERNIA REPAIR     • INCISION AND DRAINAGE HEMATOMA Right     inner thigh   • KIDNEY STONE SURGERY     • LEEP  2020       Family History   Problem Relation Age of Onset   • Colon cancer Other    • Osteoporosis Other    • Alzheimer's disease Other        Social History     Socioeconomic History   • Marital status:      Spouse name: Not on file   • Number of children: Not on file   • Years of education: Not on file   • Highest education level: Not on file   Tobacco Use   • Smoking status: Former Smoker     Years: 20.00     Quit date: 2018     Years since quittin.8   • Smokeless tobacco: Never Used   Substance and Sexual Activity   • Alcohol use: Not Currently     Comment: Per [facility], occasional/no abuse   • Drug use: Not Currently   • Sexual activity: Yes     Comment: IUD check-Mirena IUD; due for removal 3/2013   Social History Narrative    , has a daughter, unemployed         Allergies:  Allergies   Allergen Reactions   • Sulfa Antibiotics Hives     Itching, swelling       Home Medications:  No current facility-administered medications on file prior to encounter.      Current Outpatient Medications on File Prior to Encounter   Medication Sig  Dispense Refill   • citalopram (CeleXA) 40 MG tablet Take 40 mg by mouth Daily.     • ferrous sulfate 325 (65 FE) MG tablet Take 1 tablet by mouth Daily With Breakfast. 30 tablet 0   • lactobacillus acidophilus (RISAQUAD) capsule capsule Take 1 capsule by mouth Daily for 30 days. 30 capsule 0   • losartan (COZAAR) 25 MG tablet Take 50 mg by mouth Daily.     • ondansetron (ZOFRAN) 8 MG tablet Take 8 mg by mouth Every 8 (Eight) Hours As Needed for Nausea or Vomiting.     • cyclobenzaprine (FLEXERIL) 10 MG tablet Take 10 mg by mouth 3 (Three) Times a Day As Needed for Muscle Spasms.         Current Hospital Medications:  Current Facility-Administered Medications   Medication Dose Route Frequency Provider Last Rate Last Dose   • acetaminophen (TYLENOL) tablet 650 mg  650 mg Oral Q8H Kojo Barney MD   650 mg at 11/03/20 1345   • citalopram (CeleXA) tablet 40 mg  40 mg Oral Daily Noni Banks II, DO       • diazePAM (VALIUM) tablet 5 mg  5 mg Oral Q6H PRN Kojo Barney MD   5 mg at 11/03/20 1113   • [START ON 11/4/2020] ertapenem (INVanz) 1 g/100 mL 0.9% NS VTB (mbp)  1 g Intravenous Q24H Noni Banks II, DO       • famotidine (PEPCID) tablet 20 mg  20 mg Oral BID Kojo Barney MD       • gabapentin (NEURONTIN) capsule 300 mg  300 mg Oral TID Kojo Barney MD       • [START ON 11/4/2020] heparin (porcine) 5000 UNIT/ML injection 5,000 Units  5,000 Units Subcutaneous Q8H Kojo Barney MD       • HYDROcodone-acetaminophen (NORCO) 7.5-325 MG per tablet 1 tablet  1 tablet Oral Q4H PRN Kojo Barney MD       • HYDROmorphone (DILAUDID) injection 0.5 mg  0.5 mg Intravenous Q2H PRN Kojo Barney MD   0.5 mg at 11/03/20 1345    And   • naloxone (NARCAN) injection 0.4 mg  0.4 mg Intravenous Q5 Min PRN Kojo Barney MD       • ketorolac (TORADOL) injection 15 mg  15 mg Intravenous Q6H Kojo Barney MD   15 mg at 11/03/20 1102   • lactated ringers infusion  9 mL/hr Intravenous Continuous Khang Smith MD    Stopped at 11/03/20 1151   • lactobacillus acidophilus (RISAQUAD) capsule 1 capsule  1 capsule Oral Daily DarrenBebocy M II, DO       • Magnesium Sulfate 2 gram Bolus, followed by 8 gram infusion (total Mg dose 10 grams)- Mg less than or equal to 1mg/dL  2 g Intravenous PRN Darren, Noni M II, DO        Or   • Magnesium Sulfate 2 gram / 50mL Infusion (GIVE X 3 BAGS TO EQUAL 6GM TOTAL DOSE) - Mg 1.1 - 1.5 mg/dl  2 g Intravenous PRN Darren, Noni M II, DO        Or   • Magnesium Sulfate 4 gram infusion- Mg 1.6-1.9 mg/dL  4 g Intravenous PRN Darren, Noni M II, DO       • ondansetron (ZOFRAN) injection 4 mg  4 mg Intravenous Q6H PRN Kojo Barney MD       • ondansetron (ZOFRAN) tablet 4 mg  4 mg Oral Q6H PRN Darren, Noni M II, DO        Or   • ondansetron (ZOFRAN) injection 4 mg  4 mg Intravenous Q6H PRN Darren Noni M II, DO       • potassium chloride (MICRO-K) CR capsule 40 mEq  40 mEq Oral PRN Darren, Noni M II, DO        Or   • potassium chloride (KLOR-CON) packet 40 mEq  40 mEq Oral PRN Darrne, Noni M II, DO        Or   • potassium chloride 10 mEq in 100 mL IVPB  10 mEq Intravenous Q1H PRN Darren, Noni M II, DO       • Scopolamine (TRANSDERM-SCOP) 1.5 MG/3DAYS patch 1 patch  1 patch Transdermal Once Kojo Barney MD   1 patch at 11/03/20 0712   • sodium chloride 0.9 % flush 10 mL  10 mL Intravenous Q12H Darren, Noni M II, DO       • sodium chloride 0.9 % flush 10 mL  10 mL Intravenous PRN Darern, Noni M II, DO       • sodium chloride 0.9 % with KCl 20 mEq/L infusion  100 mL/hr Intravenous Continuous Kojo Barney  mL/hr at 11/03/20 1148 100 mL/hr at 11/03/20 1148       Antimicrobials:  Anti-Infectives (From admission, onward)    Ordered     Dose/Rate Route Frequency Start Stop    11/03/20 1149  ertapenem (INVanz) 1 g/100 mL 0.9% NS VTB (mbp)     Ordering Provider: Noni Banks II, DO    1 g  over 30 Minutes Intravenous Every 24 Hours 11/04/20 0600 11/11/20 0559    11/03/20 0632   "ertapenem (INVanz) 1 g/100 mL 0.9% NS VTB (mbp)     Ordering Provider: Kojo Barney MD    1 g  over 30 Minutes Intravenous Once 20 0634 20 0730          Review of Systems:   Constitutional:  No fever, chills, or sweats.  HEENT:  No new vision, hearing or throat complaints.  No oral sores.  No headache, photophobia or neck stiffness.  CV:  No chest pain, palpitations, or syncope.  Respiratory:  No SOB, cough, or hemoptysis.  GI:  No nausea, vomiting, or diarrhea.  No hematochezia, melena, or hematemesis. No jaundice or liver disease.  History of recurrent diverticulitis, as noted.  :  No dysuria, hematuria, urgency, or flank pain.  Lymph:  No swollen lymph nodes in neck, axilla, or groin.   Hematologic:  No easy bruising or bleeding.  Musculoskeletal:  No new swelling or pain of joints.  No new back pain.  Neurologic:  No acute focal weakness or numbness.  No seizures.  Skin:  No rashes, ulcers, or lesions.       Vital Signs:  Temp (24hrs), Av.7 °F (36.5 °C), Min:97.2 °F (36.2 °C), Max:97.9 °F (36.6 °C)    /68 (BP Location: Right arm, Patient Position: Lying)   Pulse 87   Temp 97.2 °F (36.2 °C) (Oral)   Resp 18   Ht 162.6 cm (64\")   Wt 81.6 kg (180 lb)   SpO2 99%   BMI 30.90 kg/m²     Physical Examination:  GENERAL: Awake and alert, in no acute distress.   LINES:  pIV intact.  HEENT: Normocephalic, atraumatic.  EOMI. No conjunctival injection or subconjunctival hemorrhage. No icterus. Oropharynx clear without evidence of thrush or exudate.  Mucous membranes a bit dry.  NECK: Supple without nuchal rigidity.  No thyromegaly.  CV: RRR. No murmur, rubs, gallops.  Normal S1S2.  LUNGS: Clear to auscultation bilaterally without wheezing, rales, rhonchi. Normal respiratory effort.  ABDOMEN: Soft, nontender, nondistended. Positive bowel sounds.  Left lower quadrant drain with serosanguineous fluid.  Right-sided ileostomy in place with liquid output.  Midline surgical dressing in place with " serosanguineous fluid noted.  EXT:  No clubbing, cyanosis, or edema.    MSK: No joint effusions or inflammation noted.  SKIN: Warm and dry without rash or ulcer.   NEURO: A&Ox4. No focal deficits.  Face symmetric.  Speech fluent.  Moves all extremities well.   PSYCHIATRIC: Normal insight and judgement.  Cooperative.  Normal affect.      Laboratory Data:    Results from last 7 days   Lab Units 10/30/20  1143   WBC 10*3/mm3 6.46   HEMOGLOBIN g/dL 11.9*   HEMATOCRIT % 36.1   PLATELETS 10*3/mm3 330     Results from last 7 days   Lab Units 10/30/20  1143   SODIUM mmol/L 137   POTASSIUM mmol/L 4.7   CHLORIDE mmol/L 104   CO2 mmol/L 23.0   BUN mg/dL 9   CREATININE mg/dL 0.85   GLUCOSE mg/dL 98   CALCIUM mg/dL 9.9     Results from last 7 days   Lab Units 10/30/20  1143   ALK PHOS U/L 93   BILIRUBIN mg/dL 0.4   ALT (SGPT) U/L 9   AST (SGOT) U/L 15             Estimated Creatinine Clearance: 73.7 mL/min (by C-G formula based on SCr of 0.85 mg/dL).                  Microbiology:  Microbiology Results (last 10 days)     Procedure Component Value - Date/Time    Wound Culture - Wound, Rectal Abscess [[id number]] Collected: 11/03/20 1000    Lab Status: Preliminary result Specimen: Wound from Rectal Abscess Updated: 11/03/20 1212     Gram Stain Many (4+) Red blood cells      Rare (1+) WBCs seen      Few (2+) Gram negative bacilli    COVID PRE-OP / PRE-PROCEDURE SCREENING ORDER (NO ISOLATION) - Swab, Nasopharynx [[id number]] Collected: 11/01/20 1113    Lab Status: Final result Specimen: Swab from Nasopharynx Updated: 11/01/20 2052    Narrative:      The following orders were created for panel order COVID PRE-OP / PRE-PROCEDURE SCREENING ORDER (NO ISOLATION) - Swab, Nasopharynx.  Procedure                               Abnormality         Status                     ---------                               -----------         ------                     COVID-19,[facility], NP ...[[id number]]                      Final result                  Please view results for these tests on the individual orders.    COVID-19,[facility], NP SWAB IN LEXAR VIRAL TRANSPORT MEDIA 24-30 HR TAT - Swab, Nasopharynx [[id number]] Collected: 11/01/20 1113    Lab Status: Final result Specimen: Swab from Nasopharynx Updated: 11/01/20 2052     SARS-CoV-2 WILLIAN Not Detected              Radiology:  Imaging Results (Last 72 Hours)     ** No results found for the last 72 hours. **              IMPRESSION:  60 y.o. female with history of recurrent diverticulitis who is admitted for planned colon resection.  Operative findings included ongoing inflammation in the sigmoid colon with contained perforation with abscess.  This was drained and culture was obtained.  S/p LAR with loop ileostomy.      Patient had recently completed a 2-week course of ertapenem followed by oral Augmentin.    PROBLEM LIST:   --Complicated sigmoid diverticulitis with contained perforation and abscess, status post surgical drainage.  Cultures pending.  --History of recurrent episodes of diverticulitis.  Status post low anterior resection with loop ileostomy 11/3/2020.    PLAN:  --Follow surgical cultures from abscess drainage; expect will be enteric quentin  --Continue on antibiotics with IV Zosyn for now  --Expect patient will likely need PICC and home infusion again, for a couple of weeks of parenteral antibiotics postop  --Trend vitals and labs, including temperature, WBC, and Cr.  --Follow results of pending culture data and tailor antibiotics as appropriate.  --Final plans pending clinical course.    Thank you for the consultation.  I will continue to follow along with you.  Plan discussed with patient.    Osmar Arias MD  11/3/2020  15:16 EST

## 2020-11-03 NOTE — OP NOTE
[facility] ([facility])    COLON RESECTION   LAPAROSCOPIC LOW ANTERIOR,  MOBILIZATION OF SPLENIC FLEXURE  DRAINAGE OF SIGMOID ABSCESS, CULTURES OBTAINED  APPENDECTOMY  LOOP ILEOSTOMY    Procedure Note    Daja Hubbard  11/3/2020    Pre-op Diagnosis: Diverticulitis, lower abdominal pain, recurrent hospitalizations, smoldering perforation in the sigmoid, preop IV antibiotics, BMI 31, previous umbilical hernia repair with mesh and staples/metal.    Post-op Diagnosis:   Diverticulitis with contained perforation/abscess, cultures obtained.  Adhesions associated with mesh in the stapler material/metal in the umbilical region.    Anesthesia: General with Block    Staff:   Circulator: Galilea Amaya RN; Junie Braga RN  Scrub Person: Emanuel Harden  Assistant: Sindi Desai RNFA    Assistant: Sindi Desai RNFA was responsible for performing the following activities: Irrigation, suction, exposure and their skilled assistance was necessary for the success of this case.    Estimated Blood Loss: 50 mL    Specimens: Rectosigmoid, appendix      Drains: Pelvic NAKITA drain    Findings: As above    Complications: None evident    The procedure was reviewed in the pretreatment area.  There have been no interval changes in health by history or physical exam.  The patient been marked for potential stoma location.    With antibiotic and DVT prophylaxis, the patient was brought to the operating room where general anesthesia was achieved, position was modified lithotomy with intermittent compression stockings.    Orogastric tube was used.    In anticipation of pelvic inflammatory process of increased complexity, urologic catheters were placed by urology, the case of been coordinated.    The abdomen and perineum were prepped and draped, timeout protocol was utilized.    Placement of GelPort at the umbilicus demonstrated metal staples associated with previous mesh.  The mesh was incised in  the midline.  There is dense omental adhesions in the lower midline associated with this mesh, the omentum was gradually dissected away with some of the omentum sent as a specimen.  Ultimately the lower abdominal abdominal wall was cleared of the omentum.    The omentum was also adherent to the left lower quadrant where there was thickening and evidence of contained perforation with abscess in the sigmoid region, cultures were obtained.    On GelPort laparoscopy, the sigmoid appeared inflamed, the left colon appeared less edematous.  The uterus was present without mass, the bilateral ureters were evident, the bilateral ovaries were without mass.  The stomach and hepatobiliary structures appeared healthy.  The small bowel appeared healthy.    In Trendelenburg, the sigmoid was mobilized away from the peritoneal reflection where the tissues handled like better and were edematous and fragile and friable.  The reflection was taken down leaving the ureter posterior lateral to the plane of dissection.  The peritoneal reflection was mobilized medial to the kidney.    In reverse Trendelenburg the flexure was completely mobilized noting omental adhesions to the spleen and some oozing which was readily controlled with Flomax.    The splenic flexure was completely mobilized from the spleen, pancreas.    The bilateral reflections were dissected in the rectosigmoid, the mid rectum was circumferentially cleared.    LigaSure [place] was used for the course of this dissection.  The dissection continued over the sacrum up to the inferior mesenteric artery which was cleared and divided with Endo RENEE vascular load.  The inferior mesenteric vein was divided with LigaSure [place] allowing the left colon to descend down to the pelvis and achieving the desired reach for anticipated anastomosis.    Appendectomy was performed given the history of pelvic pain with LigaSure and Endo RENEE blue load.    The rectum was divided with Endo RENEE  green load.    The rectosigmoid was delivered extracorporeally maintaining orientation.  The left colon had large diameter and was soft and pliable but edematous.  The 31 EEA was secured intraluminally with a pursestring device.    Reach to the pelvis was without tension or torsion.  Irrigation-aspiration demonstrated good hemostasis throughout.    The anastomosis was then performed without tension or torsion.    Proctoscopy demonstrated widely patent hemostatic airtight colorectal anastomosis.    Given the degree of inflammation, edema, and evidence of infection, protecting loop ileostomy was utilized 20 cm upstream of the ileocolic junction which was oriented with dyed and undyed Vicryl and brought out at the preoperatively identified site just lateral to the mesh.    10 flat NAKITA was used in the left lower quadrant 5 mm trocar.    Luis Aldana was used to close the right lower quadrant 12 mm trocar site.    The umbilicus was reapproximated including the previous mesh with internal retentions of oh Vicryl No. 1 single stranded PDS.    The wound was irrigated with dilute antiseptic solution and the skin was reapproximated skin clips leaving the wound open inferiorly to permit drainage.    Telfa wick was used.    The stoma was matured with Berta eversion proximally and flat maturation distally.    Final counts were announced as correct.    Kojo Barney MD     Date: 11/3/2020  Time: 10:21 EST

## 2020-11-03 NOTE — ANESTHESIA PREPROCEDURE EVALUATION
Anesthesia Evaluation     Patient summary reviewed and Nursing notes reviewed                Airway   Mallampati: I  TM distance: >3 FB  Neck ROM: full  No difficulty expected  Dental - normal exam     Pulmonary - normal exam   (+) a smoker Former,   Cardiovascular - normal exam    (+) hypertension,       Neuro/Psych  (+) psychiatric history Depression,     GI/Hepatic/Renal/Endo    (+) obesity,   renal disease ARF,     ROS Comment: DIVERTICULAR DZ  PANCREATIC INSUFFICIENCY    Musculoskeletal (-) negative ROS    Abdominal  - normal exam    Bowel sounds: normal.   Substance History - negative use     OB/GYN negative ob/gyn ROS         Other      history of cancer (CERVICAL DYSPLASIA)      Other Comment: FIBROMYALGIA                Anesthesia Plan    ASA 3     general   (TAPS FOR POSTOP PAIN)  intravenous induction     Anesthetic plan, all risks, benefits, and alternatives have been provided, discussed and informed consent has been obtained with: patient.    Plan discussed with CRNA.

## 2020-11-03 NOTE — PLAN OF CARE
Goal Outcome Evaluation:  Plan of Care Reviewed With: patient  Problem: Adult Inpatient Plan of Care  Goal: Absence of Hospital-Acquired Illness or Injury  Intervention: Identify and Manage Fall Risk  Recent Flowsheet Documentation  Taken 11/3/2020 1600 by Ying Stephens RN  Safety Promotion/Fall Prevention:   activity supervised   clutter free environment maintained   lighting adjusted   nonskid shoes/slippers when out of bed   room organization consistent   safety round/check completed   toileting scheduled  Taken 11/3/2020 1400 by Ying Stephens RN  Safety Promotion/Fall Prevention:   activity supervised   clutter free environment maintained   fall prevention program maintained   lighting adjusted   nonskid shoes/slippers when out of bed   room organization consistent   safety round/check completed   toileting scheduled  Taken 11/3/2020 1200 by Ying Stephens RN  Safety Promotion/Fall Prevention:   activity supervised   clutter free environment maintained   fall prevention program maintained   lighting adjusted   nonskid shoes/slippers when out of bed   room organization consistent   safety round/check completed   toileting scheduled

## 2020-11-03 NOTE — INTERVAL H&P NOTE
Pre-Op H&P (See Recent Office Note Attached from 10/19/20 encounter for Full H&P)    Chief complaint: abdominal pain    HPI:      Patient is a 60 y.o. female who presents with a history of abdominal pain, cramping and diarrhea due to diverticulitis. Her abdominal pain is located in the left and right lower quadrant (left>right). Patient has been treated with antibiotics in the past. She presents to the operating room today for surgical management with a lower anterior colon resection and ureteral catheter placement.    Review of Systems:  General ROS:  no fever, chills, rashes.  No change since last office visit.  No recent sick exposure  Cardiovascular ROS: no chest pain or dyspnea on exertion  Respiratory ROS: no cough, shortness of breath, or wheezing    Immunization History:   Influenza:  2019  Pneumococcal:  denies  Tetanus:  <10 years     Meds:    No current facility-administered medications on file prior to encounter.      Current Outpatient Medications on File Prior to Encounter   Medication Sig Dispense Refill   • citalopram (CeleXA) 40 MG tablet Take 40 mg by mouth Daily.     • cyclobenzaprine (FLEXERIL) 10 MG tablet Take 10 mg by mouth 3 (Three) Times a Day As Needed for Muscle Spasms.     • ferrous sulfate 325 (65 FE) MG tablet Take 1 tablet by mouth Daily With Breakfast. 30 tablet 0   • lactobacillus acidophilus (RISAQUAD) capsule capsule Take 1 capsule by mouth Daily for 30 days. 30 capsule 0   • losartan (COZAAR) 25 MG tablet Take 50 mg by mouth Daily.     • ondansetron (ZOFRAN) 8 MG tablet Take 8 mg by mouth Every 8 (Eight) Hours As Needed for Nausea or Vomiting.       Physical Exam:    CV:  S1S2 regular rate and rhythm, no murmur               Resp:  Clear to auscultation; respirations regular, even and unlabored    Results Review:     Lab Results   Component Value Date    WBC 6.46 10/30/2020    HGB 11.9 (L) 10/30/2020    HCT 36.1 10/30/2020    MCV 90.3 10/30/2020     10/30/2020     NEUTROABS 3.16 10/19/2020    GLUCOSE 98 10/30/2020    BUN 9 10/30/2020    CREATININE 0.85 10/30/2020    EGFRIFNONA 68 10/30/2020     10/30/2020    K 4.7 10/30/2020     10/30/2020    CO2 23.0 10/30/2020    MG 2.2 12/20/2019    PHOS 3.6 12/20/2019    CALCIUM 9.9 10/30/2020    ALBUMIN 4.20 10/30/2020    AST 15 10/30/2020    ALT 9 10/30/2020    BILITOT 0.4 10/30/2020         Cancer Staging (if applicable)  Cancer Patient: __ yes _x_no __unknown; If yes, clinical stage T:__ N:__M:__, stage group or __N/A    Assessment: diverticulitis     Plan: laparoscopic lower anterior resection, possible open, possible stoma; urologic catheter placement      Kenton Boggs PA-C  11/3/2020   06:50 EST

## 2020-11-03 NOTE — ANESTHESIA PROCEDURE NOTES
Airway  Urgency: elective    Date/Time: 11/3/2020 7:38 AM  Airway not difficult    General Information and Staff    Patient location during procedure: OR  CRNA: Ann Quinones CRNA    Indications and Patient Condition  Indications for airway management: airway protection    Preoxygenated: yes  MILS not maintained throughout  Mask difficulty assessment: 1 - vent by mask    Final Airway Details  Final airway type: endotracheal airway      Successful airway: ETT  Cuffed: yes   Successful intubation technique: direct laryngoscopy  Endotracheal tube insertion site: oral  Blade: Antonio  Blade size: 3  ETT size (mm): 7.0  Cormack-Lehane Classification: grade I - full view of glottis  Placement verified by: chest auscultation and capnometry   Measured from: lips  ETT/EBT  to lips (cm): 20  Number of attempts at approach: 1  Assessment: lips, teeth, and gum same as pre-op and atraumatic intubation    Additional Comments  Negative epigastric sounds, Breath sound equal bilaterally with symmetric chest rise and fall

## 2020-11-03 NOTE — OP NOTE
COLON RESECTION LAPAROSCOPIC SIGMOID OR LOW ANTERIOR, CYSTOSCOPY URETERAL CATHETER/STENT INSERTION  Procedure Note    Daja Hubbard  11/3/2020    Pre-op Diagnosis:   Diverticulitis with pelvic abscess    Post-op Diagnosis:     Diverticulitis with pelvic abscess    Procedure/CPT® Codes:  Diverticulitis with pelvic abscess    Procedure(s):    CYSTOSCOPY URETERAL CATHETER/STENT INSERTION bilateral    Surgeon(s):  Kory Chang Jr., MD Belin, Bruce M, MD    Anesthesia: General with Block    Staff:   Circulator: Galilea Amaya RN; Junei Braga RN  Scrub Person: Emanuel Harden  Assistant: Sindi Desai RNFA    Estimated Blood Loss: minimal  Urine Voided: * No values recorded between 11/3/2020  7:30 AM and 11/3/2020  9:07 AM *    Specimens:                None      Drains:   Ureteral Drain/Stent Left ureter 5 Fr. (Active)       Ureteral Drain/Stent Right ureter 5 Fr. (Active)       Findings: Normal cystoscopy    Complications: None    History: Intraoperative consult for ureteral stents for guidance during colorectal surgery    Operative Report: Patient is under general anesthesia and in the dorsolithotomy position sterilely prepped and draped in normal fashion.  Scope inserted the patient return bladder atraumatically pan cystoscopy does not reveal any urothelial abnormalities.  Both ureter orifice ease were orthotopic in nature with clear reflux.  Pollick catheters were used to serve as ureteral stents and advanced into the ureteral orifice and up into the the ureter to 22 cm on each side without any difficulty.  Ureteral stents were allowed to drain into the Edelmann catheter.  For the remainder of the operative note and colorectal details see colorectal surgery operative note.    Kory Chang Jr., MD     Date: 11/3/2020  Time: 09:07 EST

## 2020-11-03 NOTE — H&P
[facility]  HISTORY AND PHYSICAL    Patient Name: Daja Hubbard  : 1960  MRN: [id number]  Primary Care Physician: Sp Marie MD  Date of admission: 11/3/2020      Subjective   Subjective     Chief Complaint: diverticulitis    HPI:  Daja Hubbard is a 60 y.o. female brought in today by Dr. Barney for lap colon resection and drainage of sigmoid abscess related to prior bouts of diverticulitis. She was last discharged from our facility 10/12 on IV Invanz at direction of Dr. Arias for recurrent diverticulitis w/ follow up with Dr. Barney. Since that time has had ongoing cramping, pain, poor appetite. At this time is fairly comfortable without new complaints.     Current COVID Risks are:  [] Fever []  Cough [] Shortness of breath [] Fatigue [] Change in taste or smell    [] Exposure to COVID positive patient  [] High risk facility   [x]  NONE    Review of Systems   Gen- No fevers, chills  CV- No chest pain, palpitations  Resp- No cough, dyspnea  GI- No N/V/D, abd pain    All other systems reviewed and are negative.     Personal History     Past Medical History:   Diagnosis Date   • Anemia    • Cervical high risk human papillomavirus (HPV) DNA test positive 2015    NON 16/18 POSITIVE   • Cervical stricture or stenosis     cervical stricture and stenosis   • Depression    • Diverticulitis    • Fibromyalgia    • History of kidney stones    • Hypertension    • Low grade squamous intraepithelial lesion (LGSIL) on cervical Pap smear 2020    colposcopy 2020 with BX at 6 and ECC; unsatisfactory; thick WE extending inside OS   • Mild dysplasia of cervix     (DEV I)   • Mild dysplasia of cervix 2020    ECC positive LGSIL; colposcopy unsatisfactory with WE extending inside os.   • Mild dysplasia of cervix (DEV I) 2020   • Recurrent UTI    • Tobacco dependence    • Urge incontinence     has a bladder stimulator       Past Surgical History:    Procedure Laterality Date   • BLADDER SURGERY     •  SECTION     • COLONOSCOPY     • COLPOSCOPY  2015   • HERNIA REPAIR     • INCISION AND DRAINAGE HEMATOMA Right     inner thigh   • KIDNEY STONE SURGERY     • LEEP  2020       Family History: family history includes Alzheimer's disease in an other family member; Colon cancer in an other family member; Osteoporosis in an other family member. Otherwise pertinent FHx was reviewed and unremarkable.     Social History:  reports that she quit smoking about 22 months ago. She quit after 20.00 years of use. She has never used smokeless tobacco. She reports previous alcohol use. She reports previous drug use.  Social History     Social History Narrative    , has a daughter, unemployed       Medications:  Available home medication information reviewed.  Medications Prior to Admission   Medication Sig Dispense Refill Last Dose   • citalopram (CeleXA) 40 MG tablet Take 40 mg by mouth Daily.   11/3/2020 at 0430   • ferrous sulfate 325 (65 FE) MG tablet Take 1 tablet by mouth Daily With Breakfast. 30 tablet 0 11/3/2020 at 0430   • lactobacillus acidophilus (RISAQUAD) capsule capsule Take 1 capsule by mouth Daily for 30 days. 30 capsule 0 11/3/2020 at 0430   • losartan (COZAAR) 25 MG tablet Take 50 mg by mouth Daily.   2020 at 0900   • ondansetron (ZOFRAN) 8 MG tablet Take 8 mg by mouth Every 8 (Eight) Hours As Needed for Nausea or Vomiting.   11/3/2020 at 0430   • amoxicillin-clavulanate (AUGMENTIN) 875-125 MG per tablet Take 1 tablet by mouth 2 (Two) Times a Day.   2020 at 1100   • cyclobenzaprine (FLEXERIL) 10 MG tablet Take 10 mg by mouth 3 (Three) Times a Day As Needed for Muscle Spasms.   2 weeks ago   • meloxicam (MOBIC) 15 MG tablet Take 15 mg by mouth Every Other Day.   2020 at 0900       Allergies   Allergen Reactions   • Sulfa Antibiotics Hives     Itching, swelling       Objective   Objective     Vital Signs:   Temp:  [97.2 °F  (36.2 °C)-97.9 °F (36.6 °C)] 97.2 °F (36.2 °C)  Heart Rate:  [58-99] 59  Resp:  [18] 18  BP: ()/(63-80) 116/68        Physical Exam   Constitutional: Awake, alert  Eyes: PERRLA, sclerae anicteric, no conjunctival injection  HENT: NCAT, mucous membranes moist  Neck: Supple, no thyromegaly, no lymphadenopathy, trachea midline  Respiratory: Clear to auscultation bilaterally, nonlabored respirations   Cardiovascular: RRR, no murmurs, rubs, or gallops, palpable pedal pulses bilaterally  Gastrointestinal: Appropriate TTP, midline incision dressed with scant bleeding, RLQ ostomy, left NAKITA drain  Musculoskeletal: No bilateral ankle edema, no clubbing or cyanosis to extremities  Psychiatric: Appropriate affect, cooperative  Neurologic: Oriented x 3, strength symmetric in all extremities, Cranial Nerves grossly intact to confrontation, speech clear  Skin: No rashes    Results Reviewed:  I have personally reviewed most recent indicated data and agree with findings including:  [x]  Laboratory - Hb 11.9  [x]  Radiology - Personally reviewed recent CT scan showing colonic inflammation w/out obvious abscess.  [x]  EKG/Telemetry - Personally reviewed recent EKG with NSR.  []  Pathology  []  Cardiac/Vascular Studies  [x]  Old records  []  Other:    Results from last 7 days   Lab Units 10/30/20  1143   WBC 10*3/mm3 6.46   HEMOGLOBIN g/dL 11.9*   HEMATOCRIT % 36.1   PLATELETS 10*3/mm3 330     Results from last 7 days   Lab Units 10/30/20  1143   SODIUM mmol/L 137   POTASSIUM mmol/L 4.7   CHLORIDE mmol/L 104   CO2 mmol/L 23.0   BUN mg/dL 9   CREATININE mg/dL 0.85   GLUCOSE mg/dL 98   CALCIUM mg/dL 9.9   ALT (SGPT) U/L 9   AST (SGOT) U/L 15     Estimated Creatinine Clearance: 73.7 mL/min (by C-G formula based on SCr of 0.85 mg/dL).  Brief Urine Lab Results  (Last result in the past 365 days)      Color   Clarity   Blood   Leuk Est   Nitrite   Protein   CREAT   Urine HCG        10/06/20 0958 Dark Yellow Cloudy Negative Small (1+)  Positive 100 mg/dL (2+)                  Assessment/Plan   Assessment & Plan     Active Hospital Problems    Diagnosis POA   • **Diverticulitis [K57.92] Yes   • Colonic diverticular abscess [K57.20] Yes   • Essential hypertension [I10] Yes      59 y/o female with history of recurrent diverticulitis brought in by Dr. Barney for laparoscopic colon resection and drainage of sigmoid abscess.    Recurrent diverticulitis  Diverticular abscess  --Intraoperative cultures pending. Will continue IV Invanz as she has previously been on by Dr. Arias. Dr. Arias to follow while here.  --Post op pain control. ICS. Ambulate w/ PT/OT.  --CLD, advance as tolerated.  --Probiotic.  --Labs in am.    HTN, now slightly hypotensive  --Hold home losartan and hydrate gently. Resume as appropriate.    Depression  --Home Celexa    DVT prophylaxis: [facility]      CODE STATUS:  Full  Code Status and Medical Interventions:   Ordered at: 11/03/20 1059     Level Of Support Discussed With:    Patient     Code Status:    CPR     Medical Interventions (Level of Support Prior to Arrest):    Full       Admission Status:  I believe this patient meets INPATIENT status due to recurrent diverticulitis failing outpatient therapy.  I feel patient’s risk for adverse outcomes and need for care warrant INPATIENT evaluation and I predict the patient’s care encounter to likely last beyond 2 midnights.    Noni Banks II, DO  11/03/20

## 2020-11-04 LAB
ANION GAP SERPL CALCULATED.3IONS-SCNC: 9 MMOL/L (ref 5–15)
ANION GAP SERPL CALCULATED.3IONS-SCNC: 9 MMOL/L (ref 5–15)
BASOPHILS # BLD AUTO: 0.01 10*3/MM3 (ref 0–0.2)
BASOPHILS NFR BLD AUTO: 0.1 % (ref 0–1.5)
BUN SERPL-MCNC: 15 MG/DL (ref 6–20)
BUN SERPL-MCNC: 17 MG/DL (ref 6–20)
BUN/CREAT SERPL: 11 (ref 7–25)
BUN/CREAT SERPL: 8.6 (ref 7–25)
CALCIUM SPEC-SCNC: 9.1 MG/DL (ref 8.6–10.5)
CALCIUM SPEC-SCNC: 9.3 MG/DL (ref 8.6–10.5)
CHLORIDE SERPL-SCNC: 100 MMOL/L (ref 98–107)
CHLORIDE SERPL-SCNC: 105 MMOL/L (ref 98–107)
CO2 SERPL-SCNC: 19 MMOL/L (ref 22–29)
CO2 SERPL-SCNC: 22 MMOL/L (ref 22–29)
CREAT SERPL-MCNC: 1.55 MG/DL (ref 0.57–1)
CREAT SERPL-MCNC: 1.75 MG/DL (ref 0.57–1)
CYTO UR: NORMAL
DEPRECATED RDW RBC AUTO: 48.3 FL (ref 37–54)
EOSINOPHIL # BLD AUTO: 0.17 10*3/MM3 (ref 0–0.4)
EOSINOPHIL NFR BLD AUTO: 1.7 % (ref 0.3–6.2)
ERYTHROCYTE [DISTWIDTH] IN BLOOD BY AUTOMATED COUNT: 13.7 % (ref 12.3–15.4)
GFR SERPL CREATININE-BSD FRML MDRD: 30 ML/MIN/1.73
GFR SERPL CREATININE-BSD FRML MDRD: 34 ML/MIN/1.73
GLUCOSE SERPL-MCNC: 122 MG/DL (ref 65–99)
GLUCOSE SERPL-MCNC: 125 MG/DL (ref 65–99)
HCT VFR BLD AUTO: 30.1 % (ref 34–46.6)
HGB BLD-MCNC: 9.6 G/DL (ref 12–15.9)
IMM GRANULOCYTES # BLD AUTO: 0.03 10*3/MM3 (ref 0–0.05)
IMM GRANULOCYTES NFR BLD AUTO: 0.3 % (ref 0–0.5)
LAB AP CASE REPORT: NORMAL
LAB AP CLINICAL INFORMATION: NORMAL
LYMPHOCYTES # BLD AUTO: 0.98 10*3/MM3 (ref 0.7–3.1)
LYMPHOCYTES NFR BLD AUTO: 9.5 % (ref 19.6–45.3)
MCH RBC QN AUTO: 30.3 PG (ref 26.6–33)
MCHC RBC AUTO-ENTMCNC: 31.9 G/DL (ref 31.5–35.7)
MCV RBC AUTO: 95 FL (ref 79–97)
MONOCYTES # BLD AUTO: 0.53 10*3/MM3 (ref 0.1–0.9)
MONOCYTES NFR BLD AUTO: 5.2 % (ref 5–12)
NEUTROPHILS NFR BLD AUTO: 8.57 10*3/MM3 (ref 1.7–7)
NEUTROPHILS NFR BLD AUTO: 83.2 % (ref 42.7–76)
NRBC BLD AUTO-RTO: 0 /100 WBC (ref 0–0.2)
PATH REPORT.FINAL DX SPEC: NORMAL
PATH REPORT.GROSS SPEC: NORMAL
PLAT MORPH BLD: NORMAL
PLATELET # BLD AUTO: 201 10*3/MM3 (ref 140–450)
PMV BLD AUTO: 12 FL (ref 6–12)
POTASSIUM SERPL-SCNC: 5 MMOL/L (ref 3.5–5.2)
POTASSIUM SERPL-SCNC: 5.8 MMOL/L (ref 3.5–5.2)
RBC # BLD AUTO: 3.17 10*6/MM3 (ref 3.77–5.28)
SCHISTOCYTES BLD QL SMEAR: NORMAL
SODIUM SERPL-SCNC: 128 MMOL/L (ref 136–145)
SODIUM SERPL-SCNC: 136 MMOL/L (ref 136–145)
WBC # BLD AUTO: 10.29 10*3/MM3 (ref 3.4–10.8)
WBC MORPH BLD: NORMAL

## 2020-11-04 PROCEDURE — 80048 BASIC METABOLIC PNL TOTAL CA: CPT | Performed by: FAMILY MEDICINE

## 2020-11-04 PROCEDURE — 85025 COMPLETE CBC W/AUTO DIFF WBC: CPT | Performed by: COLON & RECTAL SURGERY

## 2020-11-04 PROCEDURE — 97165 OT EVAL LOW COMPLEX 30 MIN: CPT

## 2020-11-04 PROCEDURE — 80048 BASIC METABOLIC PNL TOTAL CA: CPT | Performed by: COLON & RECTAL SURGERY

## 2020-11-04 PROCEDURE — 25810000003 SODIUM CHLORIDE 0.9 % WITH KCL 20 MEQ 20-0.9 MEQ/L-% SOLUTION: Performed by: COLON & RECTAL SURGERY

## 2020-11-04 PROCEDURE — 25010000002 KETOROLAC TROMETHAMINE PER 15 MG: Performed by: COLON & RECTAL SURGERY

## 2020-11-04 PROCEDURE — 25010000002 PIPERACILLIN SOD-TAZOBACTAM PER 1 G: Performed by: INTERNAL MEDICINE

## 2020-11-04 PROCEDURE — 85007 BL SMEAR W/DIFF WBC COUNT: CPT | Performed by: COLON & RECTAL SURGERY

## 2020-11-04 PROCEDURE — 25010000002 HEPARIN (PORCINE) PER 1000 UNITS: Performed by: COLON & RECTAL SURGERY

## 2020-11-04 PROCEDURE — 99233 SBSQ HOSP IP/OBS HIGH 50: CPT | Performed by: FAMILY MEDICINE

## 2020-11-04 RX ORDER — SODIUM CHLORIDE 9 MG/ML
125 INJECTION, SOLUTION INTRAVENOUS CONTINUOUS
Status: DISCONTINUED | OUTPATIENT
Start: 2020-11-04 | End: 2020-11-08

## 2020-11-04 RX ORDER — CETIRIZINE HYDROCHLORIDE 10 MG/1
5 TABLET ORAL DAILY
Status: DISCONTINUED | OUTPATIENT
Start: 2020-11-04 | End: 2020-11-10 | Stop reason: HOSPADM

## 2020-11-04 RX ADMIN — Medication 1 CAPSULE: at 08:21

## 2020-11-04 RX ADMIN — HEPARIN SODIUM 5000 UNITS: 5000 INJECTION, SOLUTION INTRAVENOUS; SUBCUTANEOUS at 20:34

## 2020-11-04 RX ADMIN — GABAPENTIN 300 MG: 300 CAPSULE ORAL at 20:35

## 2020-11-04 RX ADMIN — ACETAMINOPHEN 650 MG: 325 TABLET, FILM COATED ORAL at 05:45

## 2020-11-04 RX ADMIN — FAMOTIDINE 20 MG: 20 TABLET, FILM COATED ORAL at 08:21

## 2020-11-04 RX ADMIN — SODIUM CHLORIDE 50 ML/HR: 9 INJECTION, SOLUTION INTRAVENOUS at 17:31

## 2020-11-04 RX ADMIN — GABAPENTIN 300 MG: 300 CAPSULE ORAL at 15:00

## 2020-11-04 RX ADMIN — HEPARIN SODIUM 5000 UNITS: 5000 INJECTION, SOLUTION INTRAVENOUS; SUBCUTANEOUS at 05:45

## 2020-11-04 RX ADMIN — ACETAMINOPHEN 650 MG: 325 TABLET, FILM COATED ORAL at 14:57

## 2020-11-04 RX ADMIN — KETOROLAC TROMETHAMINE 15 MG: 15 INJECTION, SOLUTION INTRAMUSCULAR; INTRAVENOUS at 05:45

## 2020-11-04 RX ADMIN — CETIRIZINE HYDROCHLORIDE 5 MG: 10 TABLET, FILM COATED ORAL at 17:43

## 2020-11-04 RX ADMIN — SODIUM CHLORIDE 500 ML: 4.5 INJECTION, SOLUTION INTRAVENOUS at 20:34

## 2020-11-04 RX ADMIN — HYDROCODONE BITARTRATE AND ACETAMINOPHEN 1 TABLET: 7.5; 325 TABLET ORAL at 08:27

## 2020-11-04 RX ADMIN — GABAPENTIN 300 MG: 300 CAPSULE ORAL at 08:21

## 2020-11-04 RX ADMIN — TAZOBACTAM SODIUM AND PIPERACILLIN SODIUM 3.38 G: 375; 3 INJECTION, SOLUTION INTRAVENOUS at 05:45

## 2020-11-04 RX ADMIN — ACETAMINOPHEN 650 MG: 325 TABLET, FILM COATED ORAL at 20:35

## 2020-11-04 RX ADMIN — CITALOPRAM HYDROBROMIDE 40 MG: 40 TABLET ORAL at 08:21

## 2020-11-04 RX ADMIN — TAZOBACTAM SODIUM AND PIPERACILLIN SODIUM 3.38 G: 375; 3 INJECTION, SOLUTION INTRAVENOUS at 20:34

## 2020-11-04 RX ADMIN — TAZOBACTAM SODIUM AND PIPERACILLIN SODIUM 3.38 G: 375; 3 INJECTION, SOLUTION INTRAVENOUS at 14:57

## 2020-11-04 RX ADMIN — KETOROLAC TROMETHAMINE 15 MG: 15 INJECTION, SOLUTION INTRAMUSCULAR; INTRAVENOUS at 00:46

## 2020-11-04 RX ADMIN — KETOROLAC TROMETHAMINE 15 MG: 15 INJECTION, SOLUTION INTRAMUSCULAR; INTRAVENOUS at 17:31

## 2020-11-04 RX ADMIN — FAMOTIDINE 20 MG: 20 TABLET, FILM COATED ORAL at 20:35

## 2020-11-04 RX ADMIN — HEPARIN SODIUM 5000 UNITS: 5000 INJECTION, SOLUTION INTRAVENOUS; SUBCUTANEOUS at 14:57

## 2020-11-04 RX ADMIN — POTASSIUM CHLORIDE AND SODIUM CHLORIDE 100 ML/HR: 900; 150 INJECTION, SOLUTION INTRAVENOUS at 09:51

## 2020-11-04 RX ADMIN — KETOROLAC TROMETHAMINE 15 MG: 15 INJECTION, SOLUTION INTRAMUSCULAR; INTRAVENOUS at 11:24

## 2020-11-04 NOTE — THERAPY DISCHARGE NOTE
Acute Care - Occupational Therapy Discharge  [facility]    Patient Name: Daja Hubbard  : 1960    MRN: [id number]                              Today's Date: 2020       Admit Date: 11/3/2020    Visit Dx:     ICD-10-CM ICD-9-CM   1. Diverticulitis  K57.92 562.11     Patient Active Problem List   Diagnosis   • Sigmoid diverticulitis   • Pancreatic insufficiency   • Essential hypertension   • Depression   • Hypokalemia   • AARTI (acute kidney injury) (CMS/HCC)   • Atrophic vaginitis   • Mild dysplasia of cervix (DEV I)   • Urinary incontinence   • Diverticulitis   • Colonic diverticular abscess     Past Medical History:   Diagnosis Date   • Anemia    • Cervical high risk human papillomavirus (HPV) DNA test positive 2015    NON 16/18 POSITIVE   • Cervical stricture or stenosis     cervical stricture and stenosis   • Depression    • Diverticulitis    • Fibromyalgia    • History of kidney stones    • Hypertension    • Low grade squamous intraepithelial lesion (LGSIL) on cervical Pap smear 2020    colposcopy 2020 with BX at 6 and ECC; unsatisfactory; thick WE extending inside OS   • Mild dysplasia of cervix     (DEV I)   • Mild dysplasia of cervix 2020    ECC positive LGSIL; colposcopy unsatisfactory with WE extending inside os.   • Mild dysplasia of cervix (DEV I) 2020   • Recurrent UTI    • Tobacco dependence    • Urge incontinence     has a bladder stimulator     Past Surgical History:   Procedure Laterality Date   • BLADDER SURGERY     •  SECTION     • COLONOSCOPY     • COLPOSCOPY  2015   • HERNIA REPAIR     • INCISION AND DRAINAGE HEMATOMA Right     inner thigh   • KIDNEY STONE SURGERY     • LEEP  2020     General Information     Row Name 20 1405          OT Time and Intention    Document Type  evaluation;discharge evaluation/summary  -     Mode of Treatment  occupational therapy  -     Row Name 20 1405          General Information    Patient  Profile Reviewed  yes  -     Prior Level of Function  independent:;all household mobility;transfer;ADL's;cooking;cleaning;driving  -     Existing Precautions/Restrictions  other (see comments) Olsen, ileostomy  -     Barriers to Rehab  medically complex  -     Row Name 11/04/20 1405          Home Main Entrance    Number of Stairs, Main Entrance  three  -     Row Name 11/04/20 1405          Stairs Within Home, Primary    Number of Stairs, Within Home, Primary  none  -     Row Name 11/04/20 1405          Cognition    Orientation Status (Cognition)  oriented x 4  -     Row Name 11/04/20 1405          Safety Issues, Functional Mobility    Impairments Affecting Function (Mobility)  endurance/activity tolerance  -     Comment, Safety Issues/Impairments (Mobility)  Good safety and sequencing  -       User Key  (r) = Recorded By, (t) = Taken By, (c) = Cosigned By    Initials Name Provider Type     Lora Smith OT Occupational Therapist        Mobility/ADL's     Row Name 11/04/20 1409          Bed Mobility    Bed Mobility  supine-sit;scooting/bridging  -     Scooting/Bridging [place] (Bed Mobility)  independent  -     Supine-Sit [place] (Bed Mobility)  independent  -     Assistive Device (Bed Mobility)  head of bed elevated  -     Comment (Bed Mobility)  Good safety and sequencing  -     Row Name 11/04/20 1409          Transfers    Transfers  sit-stand transfer  -     Comment (Transfers)  Good safety and sequencing.  -     Sit-Stand [place] (Transfers)  independent  -     Row Name 11/04/20 1409          Sit-Stand Transfer    Assistive Device (Sit-Stand Transfers)  -- No AD  -     Row Name 11/04/20 1409          Activities of Daily Living    BADL Assessment/Intervention  lower body dressing;grooming;upper body dressing Pt. reports being at baseline with ADLs  -     Row Name 11/04/20 1409          Lower Body Dressing Assessment/Training    [place] Level (Lower Body  Dressing)  don;socks;independent;doff  -     Position (Lower Body Dressing)  edge of bed sitting  -     Row Name 11/04/20 1409          Grooming Assessment/Training    [place] Level (Grooming)  grooming skills;independent  -     Row Name 11/04/20 1409          Upper Body Dressing Assessment/Training    [place] Level (Upper Body Dressing)  upper body dressing skills;independent  -       User Key  (r) = Recorded By, (t) = Taken By, (c) = Cosigned By    Initials Name Provider Type     Lora Smith OT Occupational Therapist        Obj/Interventions     Row Name 11/04/20 1413          Sensory Assessment (Somatosensory)    Sensory Assessment (Somatosensory)  UE sensation intact  -     Row Name 11/04/20 1413          Vision Assessment/Intervention    Visual Impairment/Limitations  corrective lenses full-time  -     Row Name 11/04/20 1413          Range of Motion Comprehensive    General Range of Motion  bilateral upper extremity ROM WFL  -[facility] Name 11/04/20 1413          Strength Comprehensive (MMT)    General Manual Muscle Testing (MMT) Assessment  upper extremity strength deficits identified  -     Row Name 11/04/20 1413          Upper Extremity (Manual Muscle Testing)    Upper Extremity: Manual Muscle Testing (MMT)  right UE strength is WFL;left UE strength is WFL  -     Row Name 11/04/20 1413          Balance    Balance Assessment  sitting static balance;sitting dynamic balance;standing static balance;standing dynamic balance  -     Static Sitting Balance  WNL;unsupported;sitting, edge of bed  -     Dynamic Sitting Balance  WNL;unsupported;sitting, edge of bed  -     Static Standing Balance  WNL;unsupported;standing  -     Dynamic Standing Balance  WFL;unsupported;standing  -       User Key  (r) = Recorded By, (t) = Taken By, (c) = Cosigned By    Initials Name Provider Type    Lora Dale OT Occupational Therapist        Goals/Plan    No documentation.        Clinical Impression     Row Name 11/04/20 1414          Pain Assessment    Additional Documentation  Pain Scale: FACES Pre/Post-Treatment (Group)  -LC     Row Name 11/04/20 1414          Pain Scale: FACES Pre/Post-Treatment    Pain: FACES Scale, Pretreatment  0-->no hurt  -LC     Posttreatment Pain Rating  0-->no hurt  -LC     Row Name 11/04/20 1414          Plan of Care Review    Plan of Care Reviewed With  patient  -LC     Progress  improving  -LC     Outcome Summary  OT eval completed. Pt. demonstrates independence with with bed mobility and STS T/Fs. Good safety and sequencing noted. Pt. reports being at baseline with ADLs. Demonstrated doning/doffing socks with independence. No further skilled OT services warranted to promote return to OF. Recommend home with family assistance at [facility].  -LC     Row Name 11/04/20 1414          Therapy Assessment/Plan (OT)    Therapy Frequency (OT)  daily  -LC     Row Name 11/04/20 1414          Therapy Plan Review/Discharge Plan (OT)    Anticipated Discharge Disposition (OT)  home with assist  -LC     Row Name 11/04/20 1414          Vital Signs    Pre Systolic BP Rehab  -- VSS, Cleared with RN for Tx  -LC     Pre Patient Position  Supine  -LC     Intra Patient Position  Standing  -LC     Post Patient Position  Sitting  -LC     Row Name 11/04/20 1414          Positioning and Restraints    Pre-Treatment Position  in bed  -LC     Post Treatment Position  bed Per pt. request  -LC     In Bed  notified nsg;supine;call light within reach;encouraged to call for assist;exit alarm on  -LC       User Key  (r) = Recorded By, (t) = Taken By, (c) = Cosigned By    Initials Name Provider Type     Lora Smith, OT Occupational Therapist        Outcome Measures     Row Name 11/04/20 1418          How much help from another is currently needed...    Putting on and taking off regular lower body clothing?  4  -LC     Bathing (including washing, rinsing, and drying)  3  -LC      Toileting (which includes using toilet bed pan or urinal)  3  -     Putting on and taking off regular upper body clothing  4  -     Taking care of personal grooming (such as brushing teeth)  4  -     Eating meals  4  -     AM-PAC 6 Clicks Score (OT)  22  -     Row Name 11/04/20 1418          Functional Assessment    Outcome Measure Options  AM-PAC 6 Clicks Daily Activity (OT)  -       User Key  (r) = Recorded By, (t) = Taken By, (c) = Cosigned By    Initials Name Provider Type     Lora Smith OT Occupational Therapist        Occupational Therapy Education                 Title: PT OT SLP Therapies (In Progress)     Topic: Occupational Therapy (In Progress)     Point: ADL training (Done)     Description:   Instruct learner(s) on proper safety adaptation and remediation techniques during self care or transfers.   Instruct in proper use of assistive devices.              Learning Progress Summary           Patient Acceptance, E, VU,DU by  at 11/4/2020 1328                   Point: Home exercise program (Not Started)     Description:   Instruct learner(s) on appropriate technique for monitoring, assisting and/or progressing therapeutic exercises/activities.              Learner Progress:  Not documented in this visit.          Point: Precautions (Done)     Description:   Instruct learner(s) on prescribed precautions during self-care and functional transfers.              Learning Progress Summary           Patient Acceptance, E, VU,DU by  at 11/4/2020 1328                   Point: Body mechanics (Done)     Description:   Instruct learner(s) on proper positioning and spine alignment during self-care, functional mobility activities and/or exercises.              Learning Progress Summary           Patient Acceptance, E, VU,DU by  at 11/4/2020 1328                               User Key     Initials Effective Dates Name Provider Type [facility] 07/18/19 -  Lora Smith OT Occupational  Therapist OT              OT Recommendation and Plan  Retired Outcome Summary/Treatment Plan (OT)  Anticipated Discharge Disposition (OT): home with assist  Therapy Frequency (OT): daily  Plan of Care Review  Plan of Care Reviewed With: patient  Progress: improving  Outcome Summary: OT eval completed. Pt. demonstrates independence with with bed mobility and STS T/Fs. Good safety and sequencing noted. Pt. reports being at baseline with ADLs. Demonstrated doning/doffing socks with independence. No further skilled OT services warranted to promote return to PLOF. Recommend home with family assistance at [facility].  Plan of Care Reviewed With: patient  Outcome Summary: OT eval completed. Pt. demonstrates independence with with bed mobility and STS T/Fs. Good safety and sequencing noted. Pt. reports being at baseline with ADLs. Demonstrated doning/doffing socks with independence. No further skilled OT services warranted to promote return to PLOF. Recommend home with family assistance at [facility].     Time Calculation:   Time Calculation- OT     Row Name 11/04/20 1420             Time Calculation- OT    OT Start Time  1328  -      OT Received On  11/04/20  -      OT Goal Re-Cert Due Date  11/14/20  -        User Key  (r) = Recorded By, (t) = Taken By, (c) = Cosigned By    Initials Name Provider Type    Lora Dale OT Occupational Therapist        Therapy Charges for Today     Code Description Service Date Service Provider Modifiers Qty    [id number]  OT EVAL LOW COMPLEXITY 4 11/4/2020 Lora Smith OT GO 1               Lora Smith OT  11/4/2020

## 2020-11-04 NOTE — PLAN OF CARE
Problem: Adult Inpatient Plan of Care  Goal: Plan of Care Review  Recent Flowsheet Documentation  Taken 11/4/2020 1414 by Lora Smith OT  Progress: improving  Plan of Care Reviewed With: patient  Outcome Summary: OT eval completed. Pt. demonstrates independence with with bed mobility and STS T/Fs. Good safety and sequencing noted. Pt. reports being at baseline with ADLs. Demonstrated doning/doffing socks with independence. No further skilled OT services warranted to promote return to PLOF. Recommend home with family assistance at discharge.

## 2020-11-04 NOTE — PROGRESS NOTES
[facility] Medicine Services  PROGRESS NOTE    Patient Name: Daja Hubbard  : 1960  MRN: [id number]    Date of Admission: 11/3/2020  Primary Care Physician: Sp Marie MD    Subjective   Subjective     CC:  F/U diverticulitis    HPI:  Patient is resting in bed. She states she has some abdominal pain but it improves with medication. She got up and ambulated some in the hallway.     Review of Systems  Gen- No fevers, chills  CV- No chest pain, palpitations  Resp- No cough, dyspnea  GI- No N/V/D, +abd pain        Objective   Objective     Vital Signs:   Temp:  [98.2 °F (36.8 °C)] 98.2 °F (36.8 °C)  Heart Rate:  [67-76] 67  Resp:  [17-18] 18  BP: (113-120)/(71-72) 120/72        Physical Exam:  Constitutional: No acute distress, awake, alert  HENT: NCAT, mucous membranes moist  Respiratory: Clear to auscultation bilaterally, respiratory effort normal   Cardiovascular: RRR, no murmurs, rubs, or gallops,  Gastrointestinal: Positive bowel sounds, soft, ostomy with some liquid drainage   Musculoskeletal: No bilateral ankle edema  Psychiatric: Appropriate affect, cooperative  Neurologic: Oriented x 3, strength symmetric in all extremities, Cranial Nerves grossly intact to confrontation, speech clear  Skin: No rashes      Results Reviewed:  Results from last 7 days   Lab Units 20  0438 10/30/20  1143   WBC 10*3/mm3 10.29 6.46   HEMOGLOBIN g/dL 9.6* 11.9*   HEMATOCRIT % 30.1* 36.1   PLATELETS 10*3/mm3 201 330     Results from last 7 days   Lab Units 20  0438 10/30/20  1143   SODIUM mmol/L 136 137   POTASSIUM mmol/L 5.8* 4.7   CHLORIDE mmol/L 105 104   CO2 mmol/L 22.0 23.0   BUN mg/dL 15 9   CREATININE mg/dL 1.75* 0.85   GLUCOSE mg/dL 125* 98   CALCIUM mg/dL 9.1 9.9   ALT (SGPT) U/L  --  9   AST (SGOT) U/L  --  15     Estimated Creatinine Clearance: 35.8 mL/min (A) (by C-G formula based on SCr of 1.75 mg/dL (H)).    Microbiology Results Abnormal     Procedure Component  Value - Date/Time    Wound Culture - Wound, Rectal Abscess [[id number]] Collected: 11/03/20 1000    Lab Status: Preliminary result Specimen: Wound from Rectal Abscess Updated: 11/04/20 0725     Wound Culture Growth present, too young to evaluate     Gram Stain Many (4+) Red blood cells      Rare (1+) WBCs seen      Few (2+) Gram negative bacilli          Imaging Results (Last 24 Hours)     ** No results found for the last 24 hours. **               I have reviewed the medications:  Scheduled Meds:acetaminophen, 650 mg, Oral, Q8H  citalopram, 40 mg, Oral, Daily  famotidine, 20 mg, Oral, BID  gabapentin, 300 mg, Oral, TID  heparin (porcine), 5,000 Units, Subcutaneous, Q8H  ketorolac, 15 mg, Intravenous, Q6H  lactobacillus acidophilus, 1 capsule, Oral, Daily  piperacillin-tazobactam, 3.375 g, Intravenous, Q8H  Scopolamine, 1 patch, Transdermal, Once  sodium chloride, 10 mL, Intravenous, Q12H      Continuous Infusions:lactated ringers, 9 mL/hr, Last Rate: Stopped (11/03/20 1151)  sodium chloride 0.9 % with KCl 20 mEq, 100 mL/hr, Last Rate: 100 mL/hr (11/04/20 0951)      PRN Meds:.diazePAM  •  HYDROcodone-acetaminophen  •  HYDROmorphone **AND** naloxone  •  magnesium sulfate **OR** magnesium sulfate **OR** magnesium sulfate  •  ondansetron  •  ondansetron **OR** ondansetron  •  potassium chloride **OR** potassium chloride **OR** potassium chloride  •  sodium chloride    Assessment/Plan   Assessment & Plan     Active Hospital Problems    Diagnosis  POA   • **Diverticulitis [K57.92]  Yes   • Colonic diverticular abscess [K57.20]  Yes   • Essential hypertension [I10]  Yes      Resolved Hospital Problems   No resolved problems to display.        Brief Hospital Course to date:  Daja Hubbard is a 60 y.o. female with PMH of HTN admitted for lap colon resection and drianage of sigmoid abscess due to diverticulitis.     DIveritcular Abscess  S/p lap colon resection, abscess drainage  -Dr Arias following, continue to follow  cultures  -Zosyn   -anticipate needing PICC and outpatient infusion for a couple of weeks  -Dr jackson following   -dr Chang performed cystoscopy and placed tse     Hyperkalemia  -K 5.8  -stop Potassium in fluids  -change fluids to NS@ 50 as she is tolerating po  -repeat BMP     HTN  -holding losartan for hypotension     Depression  -celexa    DVT Prophylaxis:  Heparin SC       Disposition: I expect the patient to be discharged TBD.     CODE STATUS:   Code Status and Medical Interventions:   Ordered at: 11/03/20 1059     Level Of Support Discussed With:    Patient     Code Status:    CPR     Medical Interventions (Level of Support Prior to Arrest):    Full       Nurys Lo, DO  11/04/20

## 2020-11-04 NOTE — NURSING NOTE
Patient has a new diverting loop ileostomy per Dr. Barney s/p Low anterior resection:     Dropped off education folder.   Stoma looks great.   Appliance is intact.   Stomal bridge in place.   Some liquid output noted.     No reported nausea or vomiting.     Discussed POC and encouraged ambulation.   Will follow daily for stomal support and education.     Thanks

## 2020-11-04 NOTE — PROGRESS NOTES
Discharge Planning Assessment  [facility]     Patient Name: Daja Hubbard  MRN: [id number]  Today's Date: 11/4/2020    Admit Date: 11/3/2020    Discharge Needs Assessment     Row Name 11/04/20 1428       Living Environment    Lives With  spouse    Current Living Arrangements  home/apartment/condo    Primary Care Provided by  self    Provides Primary Care For  no one    Family Caregiver if Needed  spouse    Able to Return to Prior Arrangements  yes       Transition Planning    Patient/Family Anticipates Transition to  home    Transportation Anticipated  family or friend will provide       Discharge Needs Assessment    Equipment Currently Used at Home  none        Discharge Plan     Row Name 11/04/20 1431       Plan    Plan  Home    Patient/Family in Agreement with Plan  yes    Plan Comments  Spoke w/ patient at bedside. She lives w/ her  in a one story in [place]. PTA she was independent with ADLs and mobility. Per ID note patient may need outpatient IV abx pending culture and lab results. D/w Kayla at [facility]. Patient has infused at home in the past and [facility] provided medication and eduation. Spoke w/ Oleksandr at [facility] and the they are following. Cost will be calculated once dc abx course is finalized. Plan is home, final ID recs pending. CM following.    Final Discharge Disposition Code  01 - home or self-care        Continued Care and Services - Admitted Since 11/3/2020    Coordination has not been started for this encounter.     Selected Continued Care - Prior Encounters Includes selections from prior encounters from 8/5/2020 to 11/4/2020    Discharged on 10/12/2020 Admission date: 10/6/2020 - [place] [facility] [place]     Service Provider Selected Services Address Phone Fax    [facility]  [facility] [address] [facility] [address] [phone]          Home Medical Care     Service Provider  Selected Services Address Phone Fax    [facility]  Home Health Services [address] , [facility] [address] [phone]                      Demographic Summary     Row Name 11/04/20 1427       General Information    Arrived From  home    Reason for Consult  discharge planning        Functional Status     Row Name 11/04/20 1427       Functional Status    Usual Activity Tolerance  good    Current Activity Tolerance  good        Psychosocial    No documentation.       Abuse/Neglect    No documentation.       Legal    No documentation.       Substance Abuse    No documentation.       Patient Forms    No documentation.           Olamide Arreola RN

## 2020-11-04 NOTE — PROGRESS NOTES
"[facility]    INPATIENT PROGRESS NOTE  2020      PATIENT NAME: Daja Hubbard  :  1960  MRN:  [id number]  Date of Admission:  11/3/2020      Antimicrobials:  Zosyn    MAR reviewed.     Reason for consultation:  Complicated diverticulitis with abscess    Interval history:  Patient more alert today.  She does not remember speaking with me yesterday.  She is tolerating clear liquids so far.  Liquid output from her stoma.  No fevers.  Tolerating antibiotics.  Creatinine is up today.    ROS:  No fevers/chills overnight.  No new rashes.  No SOB or chest pain.  No nausea/vomiting/diarrhea.  Denies side effects from antimicrobials.    Objective:  Temp (24hrs), Av.2 °F (36.8 °C), Min:98.2 °F (36.8 °C), Max:98.2 °F (36.8 °C)    /72 (BP Location: Right arm, Patient Position: Lying)   Pulse 67   Temp 98.2 °F (36.8 °C) (Axillary)   Resp 18   Ht 162.6 cm (64\")   Wt 81.6 kg (180 lb)   SpO2 99%   BMI 30.90 kg/m²     Physical Examination:  GENERAL: Awake and alert, in no acute distress.   LINES:  pIV intact.  HEENT: Normocephalic, atraumatic.  EOMI. No conjunctival injection or subconjunctival hemorrhage.  CV: RRR. No murmur, rubs, gallops.  Normal S1S2.  LUNGS: Clear to auscultation bilaterally without wheezing, rales, rhonchi. Normal respiratory effort.  ABDOMEN: Soft, nontender, nondistended. Positive bowel sounds.  Left lower quadrant drain with serosanguineous fluid.  Right-sided ileostomy in place with liquid output.  Midline surgical dressing in place.  EXT:  No clubbing, cyanosis, or edema.    MSK: No joint effusions or inflammation noted.  SKIN: Warm and dry without rash or ulcer.   NEURO: A&Ox4. No focal deficits.  Face symmetric.  Speech fluent.  Moves all extremities well.   PSYCHIATRIC: Normal insight and judgement.  Cooperative.  Normal affect.       Laboratory Data:    Results from last 7 days   Lab Units 20  0438 10/30/20  1143   WBC 10*3/mm3 10.29 " 6.46   HEMOGLOBIN g/dL 9.6* 11.9*   HEMATOCRIT % 30.1* 36.1   PLATELETS 10*3/mm3 201 330     Results from last 7 days   Lab Units 11/04/20  0438   SODIUM mmol/L 136   POTASSIUM mmol/L 5.8*   CHLORIDE mmol/L 105   CO2 mmol/L 22.0   BUN mg/dL 15   CREATININE mg/dL 1.75*   GLUCOSE mg/dL 125*   CALCIUM mg/dL 9.1     Results from last 7 days   Lab Units 10/30/20  1143   ALK PHOS U/L 93   BILIRUBIN mg/dL 0.4   ALT (SGPT) U/L 9   AST (SGOT) U/L 15             Estimated Creatinine Clearance: 35.8 mL/min (A) (by C-G formula based on SCr of 1.75 mg/dL (H)).                    Microbiology:  Microbiology Results (last 10 days)     Procedure Component Value - Date/Time    Wound Culture - Wound, Rectal Abscess [[id number]] Collected: 11/03/20 1000    Lab Status: Preliminary result Specimen: Wound from Rectal Abscess Updated: 11/04/20 0725     Wound Culture Growth present, too young to evaluate     Gram Stain Many (4+) Red blood cells      Rare (1+) WBCs seen      Few (2+) Gram negative bacilli    COVID PRE-OP / PRE-PROCEDURE SCREENING ORDER (NO ISOLATION) - Swab, Nasopharynx [[id number]] Collected: 11/01/20 1113    Lab Status: Final result Specimen: Swab from Nasopharynx Updated: 11/01/20 2052    Narrative:      The following orders were created for panel order COVID PRE-OP / PRE-PROCEDURE SCREENING ORDER (NO ISOLATION) - Swab, Nasopharynx.  Procedure                               Abnormality         Status                     ---------                               -----------         ------                     COVID-19,[facility], NP ...[[id number]]                      Final result                 Please view results for these tests on the individual orders.    COVID-19,[facility], NP SWAB IN LEXAR VIRAL TRANSPORT MEDIA 24-30 HR TAT - Swab, Nasopharynx [[id number]] Collected: 11/01/20 1113    Lab Status: Final result Specimen: Swab from Nasopharynx Updated: 11/01/20 2052     SARS-CoV-2 WILLIAN Not Detected             Radiology:  Imaging Results (Last 72 Hours)     ** No results found for the last 72 hours. **          DISCUSSION:  60 y.o. female with history of recurrent diverticulitis who is admitted for planned colon resection.  Operative findings included ongoing inflammation in the sigmoid colon with contained perforation with abscess.  This was drained and culture was obtained.  S/p LAR with loop ileostomy.  Patient had recently completed a 2-week course of ertapenem followed by oral Augmentin.    PROBLEM LIST:   --Complicated sigmoid diverticulitis with contained perforation and abscess, status post surgical drainage.  Cultures pending.  --History of recurrent episodes of diverticulitis.  Status post low anterior resection with loop ileostomy 11/3/2020.  --AARIT, new.  Postop.     PLAN:  --Follow surgical cultures from abscess drainage; expect will be enteric quentin  --Continue on antibiotics with IV Zosyn for now  --Expect patient will likely need PICC and home infusion again, for a couple of weeks of parenteral antibiotics postop - will defer pending improvement of Cr and final culture results  --Trend vitals and labs, including temperature, WBC, and Cr.  --Follow results of pending culture data and tailor antibiotics as appropriate.  --Final plans pending clinical course.  --Regarding AARTI, she is on IVF and advised to push fluid intake.    Plan discussed with patient.    Osmar Arias MD  11/4/2020  14:07 EST

## 2020-11-05 LAB
ANION GAP SERPL CALCULATED.3IONS-SCNC: 7 MMOL/L (ref 5–15)
BASOPHILS # BLD AUTO: 0.02 10*3/MM3 (ref 0–0.2)
BASOPHILS NFR BLD AUTO: 0.2 % (ref 0–1.5)
BUN SERPL-MCNC: 17 MG/DL (ref 6–20)
BUN/CREAT SERPL: 11.3 (ref 7–25)
CALCIUM SPEC-SCNC: 9 MG/DL (ref 8.6–10.5)
CHLORIDE SERPL-SCNC: 100 MMOL/L (ref 98–107)
CO2 SERPL-SCNC: 21 MMOL/L (ref 22–29)
CREAT SERPL-MCNC: 1.5 MG/DL (ref 0.57–1)
DEPRECATED RDW RBC AUTO: 46.7 FL (ref 37–54)
EOSINOPHIL # BLD AUTO: 0.01 10*3/MM3 (ref 0–0.4)
EOSINOPHIL NFR BLD AUTO: 0.1 % (ref 0.3–6.2)
ERYTHROCYTE [DISTWIDTH] IN BLOOD BY AUTOMATED COUNT: 13.6 % (ref 12.3–15.4)
GFR SERPL CREATININE-BSD FRML MDRD: 36 ML/MIN/1.73
GLUCOSE SERPL-MCNC: 100 MG/DL (ref 65–99)
HCT VFR BLD AUTO: 29.8 % (ref 34–46.6)
HGB BLD-MCNC: 9.8 G/DL (ref 12–15.9)
IMM GRANULOCYTES # BLD AUTO: 0.03 10*3/MM3 (ref 0–0.05)
IMM GRANULOCYTES NFR BLD AUTO: 0.3 % (ref 0–0.5)
LYMPHOCYTES # BLD AUTO: 3.13 10*3/MM3 (ref 0.7–3.1)
LYMPHOCYTES NFR BLD AUTO: 31.8 % (ref 19.6–45.3)
MCH RBC QN AUTO: 30.4 PG (ref 26.6–33)
MCHC RBC AUTO-ENTMCNC: 32.9 G/DL (ref 31.5–35.7)
MCV RBC AUTO: 92.5 FL (ref 79–97)
MONOCYTES # BLD AUTO: 0.58 10*3/MM3 (ref 0.1–0.9)
MONOCYTES NFR BLD AUTO: 5.9 % (ref 5–12)
NEUTROPHILS NFR BLD AUTO: 6.07 10*3/MM3 (ref 1.7–7)
NEUTROPHILS NFR BLD AUTO: 61.7 % (ref 42.7–76)
NRBC BLD AUTO-RTO: 0 /100 WBC (ref 0–0.2)
PLATELET # BLD AUTO: 216 10*3/MM3 (ref 140–450)
PMV BLD AUTO: 12.1 FL (ref 6–12)
POTASSIUM SERPL-SCNC: 4.3 MMOL/L (ref 3.5–5.2)
RBC # BLD AUTO: 3.22 10*6/MM3 (ref 3.77–5.28)
SODIUM SERPL-SCNC: 128 MMOL/L (ref 136–145)
WBC # BLD AUTO: 9.84 10*3/MM3 (ref 3.4–10.8)

## 2020-11-05 PROCEDURE — 25010000002 ONDANSETRON PER 1 MG: Performed by: COLON & RECTAL SURGERY

## 2020-11-05 PROCEDURE — 63710000001 ONDANSETRON PER 8 MG: Performed by: INTERNAL MEDICINE

## 2020-11-05 PROCEDURE — 25010000002 PIPERACILLIN SOD-TAZOBACTAM PER 1 G: Performed by: INTERNAL MEDICINE

## 2020-11-05 PROCEDURE — 97161 PT EVAL LOW COMPLEX 20 MIN: CPT | Performed by: PHYSICAL THERAPIST

## 2020-11-05 PROCEDURE — 25010000002 HEPARIN (PORCINE) PER 1000 UNITS: Performed by: COLON & RECTAL SURGERY

## 2020-11-05 PROCEDURE — 25010000002 ONDANSETRON PER 1 MG: Performed by: INTERNAL MEDICINE

## 2020-11-05 PROCEDURE — 99232 SBSQ HOSP IP/OBS MODERATE 35: CPT | Performed by: FAMILY MEDICINE

## 2020-11-05 PROCEDURE — 25010000002 KETOROLAC TROMETHAMINE PER 15 MG: Performed by: COLON & RECTAL SURGERY

## 2020-11-05 PROCEDURE — 25010000002 HYDROMORPHONE PER 4 MG: Performed by: COLON & RECTAL SURGERY

## 2020-11-05 PROCEDURE — 85025 COMPLETE CBC W/AUTO DIFF WBC: CPT | Performed by: INTERNAL MEDICINE

## 2020-11-05 PROCEDURE — 80048 BASIC METABOLIC PNL TOTAL CA: CPT | Performed by: INTERNAL MEDICINE

## 2020-11-05 PROCEDURE — 82570 ASSAY OF URINE CREATININE: CPT | Performed by: COLON & RECTAL SURGERY

## 2020-11-05 RX ORDER — LOSARTAN POTASSIUM 25 MG/1
50 TABLET ORAL DAILY
Status: DISCONTINUED | OUTPATIENT
Start: 2020-11-06 | End: 2020-11-10 | Stop reason: HOSPADM

## 2020-11-05 RX ADMIN — SODIUM CHLORIDE, PRESERVATIVE FREE 10 ML: 5 INJECTION INTRAVENOUS at 20:25

## 2020-11-05 RX ADMIN — KETOROLAC TROMETHAMINE 15 MG: 15 INJECTION, SOLUTION INTRAMUSCULAR; INTRAVENOUS at 05:49

## 2020-11-05 RX ADMIN — SODIUM CHLORIDE, POTASSIUM CHLORIDE, SODIUM LACTATE AND CALCIUM CHLORIDE 500 ML: 600; 310; 30; 20 INJECTION, SOLUTION INTRAVENOUS at 11:32

## 2020-11-05 RX ADMIN — ONDANSETRON HYDROCHLORIDE 4 MG: 2 SOLUTION INTRAMUSCULAR; INTRAVENOUS at 16:08

## 2020-11-05 RX ADMIN — CITALOPRAM HYDROBROMIDE 40 MG: 40 TABLET ORAL at 09:09

## 2020-11-05 RX ADMIN — HYDROMORPHONE HYDROCHLORIDE 0.5 MG: 1 INJECTION, SOLUTION INTRAMUSCULAR; INTRAVENOUS; SUBCUTANEOUS at 11:32

## 2020-11-05 RX ADMIN — ACETAMINOPHEN 650 MG: 325 TABLET, FILM COATED ORAL at 14:51

## 2020-11-05 RX ADMIN — DIAZEPAM 5 MG: 5 TABLET ORAL at 09:18

## 2020-11-05 RX ADMIN — ONDANSETRON HYDROCHLORIDE 4 MG: 4 TABLET, FILM COATED ORAL at 09:18

## 2020-11-05 RX ADMIN — SODIUM CHLORIDE 125 ML/HR: 9 INJECTION, SOLUTION INTRAVENOUS at 14:51

## 2020-11-05 RX ADMIN — HYDROCODONE BITARTRATE AND ACETAMINOPHEN 1 TABLET: 7.5; 325 TABLET ORAL at 22:44

## 2020-11-05 RX ADMIN — TAZOBACTAM SODIUM AND PIPERACILLIN SODIUM 3.38 G: 375; 3 INJECTION, SOLUTION INTRAVENOUS at 05:49

## 2020-11-05 RX ADMIN — ONDANSETRON HYDROCHLORIDE 4 MG: 2 SOLUTION INTRAMUSCULAR; INTRAVENOUS at 02:54

## 2020-11-05 RX ADMIN — TAZOBACTAM SODIUM AND PIPERACILLIN SODIUM 3.38 G: 375; 3 INJECTION, SOLUTION INTRAVENOUS at 20:25

## 2020-11-05 RX ADMIN — FAMOTIDINE 20 MG: 20 TABLET, FILM COATED ORAL at 09:09

## 2020-11-05 RX ADMIN — Medication 1 CAPSULE: at 09:09

## 2020-11-05 RX ADMIN — TAZOBACTAM SODIUM AND PIPERACILLIN SODIUM 3.38 G: 375; 3 INJECTION, SOLUTION INTRAVENOUS at 14:51

## 2020-11-05 RX ADMIN — CETIRIZINE HYDROCHLORIDE 5 MG: 10 TABLET, FILM COATED ORAL at 09:09

## 2020-11-05 RX ADMIN — HEPARIN SODIUM 5000 UNITS: 5000 INJECTION, SOLUTION INTRAVENOUS; SUBCUTANEOUS at 05:49

## 2020-11-05 RX ADMIN — GABAPENTIN 300 MG: 300 CAPSULE ORAL at 09:09

## 2020-11-05 RX ADMIN — HEPARIN SODIUM 5000 UNITS: 5000 INJECTION, SOLUTION INTRAVENOUS; SUBCUTANEOUS at 14:51

## 2020-11-05 RX ADMIN — HEPARIN SODIUM 5000 UNITS: 5000 INJECTION, SOLUTION INTRAVENOUS; SUBCUTANEOUS at 20:24

## 2020-11-05 RX ADMIN — KETOROLAC TROMETHAMINE 15 MG: 15 INJECTION, SOLUTION INTRAMUSCULAR; INTRAVENOUS at 00:11

## 2020-11-05 NOTE — THERAPY EVALUATION
Patient Name: Daja Hubbard  : 1960    MRN: [id number]                              Today's Date: 2020       Admit Date: 11/3/2020    Visit Dx:     ICD-10-CM ICD-9-CM   1. Diverticulitis  K57.92 562.11     Patient Active Problem List   Diagnosis   • Sigmoid diverticulitis   • Pancreatic insufficiency   • Essential hypertension   • Depression   • Hypokalemia   • AARTI (acute kidney injury) (CMS/HCC)   • Atrophic vaginitis   • Mild dysplasia of cervix (DEV I)   • Urinary incontinence   • Diverticulitis   • Colonic diverticular abscess     Past Medical History:   Diagnosis Date   • Anemia    • Cervical high risk human papillomavirus (HPV) DNA test positive 2015    NON 16/18 POSITIVE   • Cervical stricture or stenosis     cervical stricture and stenosis   • Depression    • Diverticulitis    • Fibromyalgia    • History of kidney stones    • Hypertension    • Low grade squamous intraepithelial lesion (LGSIL) on cervical Pap smear 2020    colposcopy 2020 with BX at 6 and ECC; unsatisfactory; thick WE extending inside OS   • Mild dysplasia of cervix     (DEV I)   • Mild dysplasia of cervix 2020    ECC positive LGSIL; colposcopy unsatisfactory with WE extending inside os.   • Mild dysplasia of cervix (DEV I) 2020   • Recurrent UTI    • Tobacco dependence    • Urge incontinence     has a bladder stimulator     Past Surgical History:   Procedure Laterality Date   • BLADDER SURGERY     •  SECTION     • COLON RESECTION N/A 11/3/2020    Procedure: LAPROSCOPIC LOWER ANTERIOR RESECTION, LOOP ILEOSTOMY CREATION, MOBOLIZATION OF SPLENIC FLEXURE, APPENDECTOMY;  Surgeon: Kojo Barney MD;  Location:  [facility];  Service: General;  Laterality: N/A;   • COLONOSCOPY     • COLPOSCOPY  2015   • CYSTOSCOPY W/ URETERAL STENT PLACEMENT  11/3/2020    Procedure: CYSTOSCOPY URETERAL CATHETER INSERTION;  Surgeon: Kroy Chang Jr., MD;  Location:  [facility];  Service: Urology;;   •  HERNIA REPAIR     • INCISION AND DRAINAGE HEMATOMA Right     inner thigh   • KIDNEY STONE SURGERY     • LEEP  08/11/2020     General Information     Row Name 11/05/20 1017          Physical Therapy Time and Intention    Document Type  evaluation  -LM     Mode of Treatment  individual therapy;physical therapy  -     Row Name 11/05/20 1017          General Information    Patient Profile Reviewed  yes  -LM     Prior Level of Function  independent:;all household mobility;gait;ADL's  -LM     Existing Precautions/Restrictions  fall;other (see comments) Ileostomy; NAKITA Drain  -LM     Barriers to Rehab  none identified  -LM     Row Name 11/05/20 1017          Living Environment    Lives With  spouse  -LM     Row Name 11/05/20 1017          Home Main Entrance    Number of Stairs, Main Entrance  three  -LM     Stair Railings, Main Entrance  railings on both sides of stairs  -LM     Row Name 11/05/20 1017          Stairs Within Home, Primary    Number of Stairs, Within Home, Primary  none  -LM     Row Name 11/05/20 1017          Cognition    Orientation Status (Cognition)  oriented x 4  -LM     Row Name 11/05/20 1017          Safety Issues, Functional Mobility    Impairments Affecting Function (Mobility)  balance;endurance/activity tolerance;pain  -LM       User Key  (r) = Recorded By, (t) = Taken By, (c) = Cosigned By    Initials Name Provider Type    LM Angela Payton PT Physical Therapist        Mobility     Row Name 11/05/20 1017          Bed Mobility    Bed Mobility  supine-sit;sit-supine  -LM     Supine-Sit [place] (Bed Mobility)  standby assist  -LM     Sit-Supine [place] (Bed Mobility)  standby assist  -LM     Assistive Device (Bed Mobility)  bed rails;head of bed elevated  -LM     Comment (Bed Mobility)  Increased time/effort needed with supine<-->sit.  -LM     Row Name 11/05/20 1017          Transfers    Comment (Transfers)  Pt stood x 2 - first from EOB, second from rolling chair in the hallway.  -LM      Row Name 11/05/20 1017          Sit-Stand Transfer    Sit-Stand [place] (Transfers)  contact guard;1 person assist  -LM     Assistive Device (Sit-Stand Transfers)  -- No AD  -LM     Row Name 11/05/20 1017          Gait/Stairs (Locomotion)    [place] Level (Gait)  contact guard;1 person assist;verbal cues  -LM     Assistive Device (Gait)  -- No AD  -LM     Distance in Feet (Gait)  60 feet x 2 with sitting rest break in between  -LM     Deviations/Abnormal Patterns (Gait)  manjeet decreased;stride length decreased  -LM     Comment (Gait/Stairs)  Pt complained of dizziness and increased nausea with ambulation.  2 LOBs noted.  Pt had to sit in chair in the hallway.  Once recovered, pt able to make it safely back to room - however, pt began vomiting (RN notified).  -LM       User Key  (r) = Recorded By, (t) = Taken By, (c) = Cosigned By    Initials Name Provider Type    LM Angela Payton PT Physical Therapist        Obj/Interventions     Row Name 11/05/20 1017          Range of Motion Comprehensive    General Range of Motion  bilateral lower extremity ROM WFL  -LM     Row Name 11/05/20 1017          Strength Comprehensive (MMT)    General Manual Muscle Testing (MMT) Assessment  no strength deficits identified BLEs  -LM     Row Name 11/05/20 1017          Balance    Balance Assessment  sitting static balance;standing static balance;standing dynamic balance  -LM     Static Sitting Balance  WFL;sitting, edge of bed  -LM     Dynamic Sitting Balance  WFL;unsupported  -LM     Static Standing Balance  mild impairment;unsupported  -LM     Row Name 11/05/20 1017          Sensory Assessment (Somatosensory)    Sensory Assessment (Somatosensory)  LE sensation intact  -LM       User Key  (r) = Recorded By, (t) = Taken By, (c) = Cosigned By    Initials Name Provider Type    LM Angela Payton PT Physical Therapist        Goals/Plan     Row Name 11/05/20 1017          Transfer Goal 1 (PT)    Activity/Assistive Device (Transfer  Goal 1, PT)  bed-to-chair/chair-to-bed  -LM     [place] Level/Cues Needed (Transfer Goal 1, PT)  independent  -LM     Time Frame (Transfer Goal 1, PT)  long term goal (LTG);1 week  -LM     Row Name 11/05/20 1017          Gait Training Goal 1 (PT)    Activity/Assistive Device (Gait Training Goal 1, PT)  gait (walking locomotion)  -LM     [place] Level (Gait Training Goal 1, PT)  independent  -LM     Distance (Gait Training Goal 1, PT)  300 feet  -LM     Time Frame (Gait Training Goal 1, PT)  long term goal (LTG);1 week  -LM     Row Name 11/05/20 1017          Stairs Goal 1 (PT)    Activity/Assistive Device (Stairs Goal 1, PT)  ascending stairs;descending stairs;using handrail, left;using handrail, right  -LM     [place] Level/Cues Needed (Stairs Goal 1, PT)  modified independence  -LM     Number of Stairs (Stairs Goal 1, PT)  3  -LM     Time Frame (Stairs Goal 1, PT)  long term goal (LTG);1 week  -LM       User Key  (r) = Recorded By, (t) = Taken By, (c) = Cosigned By    Initials Name Provider Type    LM Angela Payton, PT Physical Therapist        Clinical Impression     Row Name 11/05/20 1017          Pain    Additional Documentation  Pain Scale: Numbers Pre/Post-Treatment (Group)  -LM     Row Name 11/05/20 1017          Pain Scale: Numbers Pre/Post-Treatment    Pretreatment Pain Rating  5/10  -LM     Posttreatment Pain Rating  5/10  -LM     Pain Location - Orientation  incisional  -LM     Pain Location  abdomen  -LM     Pain Intervention(s)  Repositioned;Ambulation/increased activity  -LM     Row Name 11/05/20 1017          Plan of Care Review    Plan of Care Reviewed With  patient  -LM     Outcome Summary  PT evaluation completed.  Pt transferred supine<-->sit with SBA (increased time/effort needed), stood x 2 with CGAx1, and ambulated 60 feet x 2 without AD with CGAx1 - 2 LOB's noted.  Pt reported dizziness and increased nausea with gait and had to sit in rolling chair in the hallway to rest.  Pt  able to make it safely back to room, but did vomit (RN notified).  Skilled PT services warranted to improve mobility and safety.  -LM     Row Name 11/05/20 1017          Therapy Assessment/Plan (PT)    Rehab Potential (PT)  good, to achieve stated therapy goals  -LM     Criteria for Skilled Interventions Met (PT)  yes;meets criteria;skilled treatment is necessary  -LM     Row Name 11/05/20 1017          Vital Signs    Pre Systolic BP Rehab  149  -LM     Pre Treatment Diastolic BP  91  -LM     Post Systolic BP Rehab  166  -LM     Post Treatment Diastolic BP  86  -LM     Pretreatment Heart Rate (beats/min)  58  -LM     Posttreatment Heart Rate (beats/min)  62  -LM     Pre SpO2 (%)  95  -LM     O2 Delivery Pre Treatment  room air  -LM     Post SpO2 (%)  96  -LM     O2 Delivery Post Treatment  room air  -LM     Pre Patient Position  Supine  -LM     Post Patient Position  Supine  -LM     Row Name 11/05/20 1017          Positioning and Restraints    Pre-Treatment Position  in bed  -LM     Post Treatment Position  bed  -LM     In Bed  supine;call light within reach;encouraged to call for assist;with family/caregiver;notified nsg  -LM       User Key  (r) = Recorded By, (t) = Taken By, (c) = Cosigned By    Initials Name Provider Type    LM Angela Payton, PT Physical Therapist        Outcome Measures     Row Name 11/05/20 1017          How much help from another person do you currently need...    Turning from your back to your side while in flat bed without using bedrails?  4  -LM     Moving from lying on back to sitting on the side of a flat bed without bedrails?  3  -LM     Moving to and from a bed to a chair (including a wheelchair)?  3  -LM     Standing up from a chair using your arms (e.g., wheelchair, bedside chair)?  3  -LM     Climbing 3-5 steps with a railing?  3  -LM     To walk in hospital room?  3  -LM     AM-PAC 6 Clicks Score (PT)  19  -LM     Row Name 11/05/20 1017          Functional Assessment    Outcome  Measure Options  AM-PAC 6 Clicks Basic Mobility (PT)  -       User Key  (r) = Recorded By, (t) = Taken By, (c) = Cosigned By    Initials Name Provider Type    Angela Acosta PT Physical Therapist        Physical Therapy Education                 Title: PT OT SLP Therapies (Done)     Topic: Physical Therapy (Done)     Point: Mobility training (Done)     Learning Progress Summary           Patient Acceptance, E, VU by  at 11/5/2020 1102                   Point: Precautions (Done)     Learning Progress Summary           Patient Acceptance, E, VU by  at 11/5/2020 1102                               User Key     Initials Effective Dates Name Provider Type Discipline     07/24/19 -  Anegla Payton PT Physical Therapist PT              PT Recommendation and Plan  Planned Therapy Interventions (PT): balance training, bed mobility training, gait training, home exercise program, motor coordination training, neuromuscular re-education, patient/family education, postural re-education, ROM (range of motion), stair training, strengthening, stretching, transfer training  Plan of Care Reviewed With: patient  Outcome Summary: PT evaluation completed.  Pt transferred supine<-->sit with SBA (increased time/effort needed), stood x 2 with CGAx1, and ambulated 60 feet x 2 without AD with CGAx1 - 2 LOB's noted.  Pt reported dizziness and increased nausea with gait and had to sit in rolling chair in the hallway to rest.  Pt able to make it safely back to room, but did vomit (RN notified).  Skilled PT services warranted to improve mobility and safety.     Time Calculation:   PT Charges     Row Name 11/05/20 1017             Time Calculation    Start Time  1017  -LM      PT Received On  11/05/20  -      PT Goal Re-Cert Due Date  11/15/20  -        User Key  (r) = Recorded By, (t) = Taken By, (c) = Cosigned By    Initials Name Provider Type    Angela Acosta PT Physical Therapist        Therapy Charges for Today     Code  Description Service Date Service Provider Modifiers Qty    98907869305 HC PT EVAL LOW COMPLEXITY 4 11/5/2020 Angela Payton, PT GP 1          PT G-Codes  Outcome Measure Options: AM-PAC 6 Clicks Basic Mobility (PT)  AM-PAC 6 Clicks Score (PT): 19  AM-PAC 6 Clicks Score (OT): 22    Angela Payton PT  11/5/2020

## 2020-11-05 NOTE — NURSING NOTE
C follow-up:     Appliance is intact.   Was changed last night r/t leaking.   Stoma looks great and bridge is in place.   Will plan to remove bridge tomorrow or Saturday.   Output has slowed down.   She is ambulating.     Recently vomited.   Discussed with RN.    Olsen was removed this AM.   Patient still has not voided.   RN stated that she will bladder scan her.     Will plan to meet with patient and spouse Saturday AM for extensive stomal education.     POC discussed with patient and RN.     Thanks

## 2020-11-05 NOTE — PROGRESS NOTES
[facility] Medicine Services  PROGRESS NOTE    Patient Name: Daja Hubbard  : 1960  MRN: [id number]    Date of Admission: 11/3/2020  Primary Care Physician: Sp Marie MD    Subjective   Subjective     CC:  F/U diverticulitis    HPI:   Patient is resting in bed. She has been up and ambulated. She was nauseated and vomitted. NG tube placed and she feels much better.     Review of Systems  Gen- No fevers, chills  CV- No chest pain, palpitations  Resp- No cough, dyspnea  GI- + N/V NO /D, +abd pain        Objective   Objective     Vital Signs:   Temp:  [97.3 °F (36.3 °C)-98.2 °F (36.8 °C)] 97.3 °F (36.3 °C)  Heart Rate:  [51-67] 51  Resp:  [16-18] 18  BP: (120-149)/(72-98) 149/98        Physical Exam:  Constitutional: No acute distress, awake, alert  HENT: NCAT, mucous membranes moist, NG in place   Respiratory: Clear to auscultation bilaterally, respiratory effort normal   Cardiovascular: RRR, no murmurs, rubs, or gallops,  Gastrointestinal: decreased  bowel sounds, soft, ostomy in place   Musculoskeletal: No bilateral ankle edema  Psychiatric: Appropriate affect, cooperative  Neurologic: Oriented x 3, strength symmetric in all extremities, Cranial Nerves grossly intact to confrontation, speech clear  Skin: No rashes      Results Reviewed:  Results from last 7 days   Lab Units 20  0438 11/04/20  0438 10/30/20  1143   WBC 10*3/mm3 9.84 10.29 6.46   HEMOGLOBIN g/dL 9.8* 9.6* 11.9*   HEMATOCRIT % 29.8* 30.1* 36.1   PLATELETS 10*3/mm3 216 201 330     Results from last 7 days   Lab Units 20  0438 20  1721 11/04/20  0438 10/30/20  1143   SODIUM mmol/L 128* 128* 136 137   POTASSIUM mmol/L 4.3 5.0 5.8* 4.7   CHLORIDE mmol/L 100 100 105 104   CO2 mmol/L 21.0* 19.0* 22.0 23.0   BUN mg/dL 17 17 15 9   CREATININE mg/dL 1.50* 1.55* 1.75* 0.85   GLUCOSE mg/dL 100* 122* 125* 98   CALCIUM mg/dL 9.0 9.3 9.1 9.9   ALT (SGPT) U/L  --   --   --  9   AST (SGOT) U/L  --   --   --   15     Estimated Creatinine Clearance: 41.8 mL/min (A) (by C-G formula based on SCr of 1.5 mg/dL (H)).    Microbiology Results Abnormal     Procedure Component Value - Date/Time    Wound Culture - Wound, Rectal Abscess [[id number]] Collected: 11/03/20 1000    Lab Status: Preliminary result Specimen: Wound from Rectal Abscess Updated: 11/04/20 0725     Wound Culture Growth present, too young to evaluate     Gram Stain Many (4+) Red blood cells      Rare (1+) WBCs seen      Few (2+) Gram negative bacilli          Imaging Results (Last 24 Hours)     ** No results found for the last 24 hours. **               I have reviewed the medications:  Scheduled Meds:acetaminophen, 650 mg, Oral, Q8H  cetirizine, 5 mg, Oral, Daily  citalopram, 40 mg, Oral, Daily  famotidine, 20 mg, Oral, BID  gabapentin, 300 mg, Oral, TID  heparin (porcine), 5,000 Units, Subcutaneous, Q8H  lactobacillus acidophilus, 1 capsule, Oral, Daily  piperacillin-tazobactam, 3.375 g, Intravenous, Q8H  Scopolamine, 1 patch, Transdermal, Once  sodium chloride, 10 mL, Intravenous, Q12H      Continuous Infusions:sodium chloride, 125 mL/hr, Last Rate: 125 mL/hr (11/04/20 2038)      PRN Meds:.diazePAM  •  HYDROcodone-acetaminophen  •  HYDROmorphone **AND** naloxone  •  magnesium sulfate **OR** magnesium sulfate **OR** magnesium sulfate  •  ondansetron  •  ondansetron **OR** ondansetron  •  potassium chloride **OR** potassium chloride **OR** potassium chloride  •  sodium chloride    Assessment/Plan   Assessment & Plan     Active Hospital Problems    Diagnosis  POA   • **Diverticulitis [K57.92]  Yes   • Colonic diverticular abscess [K57.20]  Yes   • Essential hypertension [I10]  Yes      Resolved Hospital Problems   No resolved problems to display.        Brief Hospital Course to date:  Daja Hubbard is a 60 y.o. female with PMH of HTN admitted for lap colon resection and drianage of sigmoid abscess due to diverticulitis.     Diveritcular Abscess  S/p lap colon  resection, abscess drainage  -Dr Arias following, wound culture rare pseudomonas  -Zosyn   -anticipate needing PICC and outpatient infusion for a couple of weeks  -Dr jackson following   -dr Chang performed cystoscopy and placed tse     AARTI  Hyponatremia  Hyperkalemia-resolved   -cr 1.5 down from 1.55  -stop toradol   -Na is 128, hypervolemic hyponatremia  -NS is @ 125 but she is no longer taking in po   -repeat BMP     HTN  -re-start losartan      Depression  -celexa    DVT Prophylaxis:  Heparin SC       Disposition: I expect the patient to be discharged TBD.     CODE STATUS:   Code Status and Medical Interventions:   Ordered at: 11/03/20 1059     Level Of Support Discussed With:    Patient     Code Status:    CPR     Medical Interventions (Level of Support Prior to Arrest):    Full       Nurys Lo, DO  11/05/20

## 2020-11-05 NOTE — PROGRESS NOTES
Nausea and vomiting, NG placed with 600 mL out  I have ordered bolus to compensate for fluid losses.    Appears nontoxic  Labs and data look okay, creatinine without complete normalization though.    Abdomen is soft  Some bile per stoma  NAKITA output serosanguineous, moderate volume    Plan  Continued frequent ambulation which has been going well  Ongoing interval lab work to track renal function  Abdominal fluid/NAKITA fluid for creatinine level

## 2020-11-05 NOTE — PROGRESS NOTES
"[facility] ([facility])  No problems overnight/today  Vital Signs:  Blood pressure 120/72, pulse 67, temperature 98.2 °F (36.8 °C), temperature source Axillary, resp. rate 18, height 162.6 cm (64\"), weight 81.6 kg (180 lb), SpO2 99 %.    Labs past 24 hours:  Lab Results (last 24 hours)     Procedure Component Value Units Date/Time    Basic Metabolic Panel [[id number]]  (Abnormal) Collected: 11/04/20 1721    Specimen: Blood Updated: 11/04/20 1745     Glucose 122 mg/dL      BUN 17 mg/dL      Creatinine 1.55 mg/dL      Sodium 128 mmol/L      Potassium 5.0 mmol/L      Comment: Slight hemolysis detected by analyzer. Results may be affected.        Chloride 100 mmol/L      CO2 19.0 mmol/L      Calcium 9.3 mg/dL      eGFR Non African Amer 34 mL/min/1.73      BUN/Creatinine Ratio 11.0     Anion Gap 9.0 mmol/L     Narrative:      GFR Normal >60  Chronic Kidney Disease <60  Kidney Failure <15      Tissue Pathology Exam [[id number]] Collected: 11/03/20 1018    Specimen: Tissue from Large Intestine, Sigmoid Colon Updated: 11/04/20 1411     Case Report --     Surgical Pathology Report                         Case: [id number]                                  Authorizing Provider:  Kojo Barney MD         Collected:           11/03/2020 10:18 AM          Ordering Location:     [facility]   Received:            11/03/2020 12:00 PM                                 OR                                                                           Pathologist:           Suman Mukherjee MD                                                           Specimen:    Large Intestine, Sigmoid Colon, RECTOSIGMOID COLON AND ANASTAMOSIS                          Clinical Information --     The working history is sigmoid diverticulitis.       Final Diagnosis --     RECTOSIGMOID COLON SEGMENT WITH INCIDENTAL APPENDIX, OMENTUM AND ANASTOMOTIC DONUTS:  Diverticulosis with area of chronic active inflammation and " focal area of foreign body reaction compatible with previous perforation.   Margins free of atypia.   Anastomotic donuts without evidence of inflammation or neoplasm.   OMENTUM:  Benign adipose tissue without evidence of neoplasm.  APPENDIX, INCIDENTAL APPENDECTOMY:  Appendix with fibrous obliteration of distal lumen.  Negative for acute inflammation and neoplasm.   KRYSTINA/renny        Gross Description --     Received in formalin labeled as rectosigmoid colon and anastomosis consists of an 18.0 cm in length by 3.2 cm in diameter segment of intact, unopened colon with an abundant amount of attached yellow lobular and focally dusky mesenteric fat.  Sectioning reveals a diffusely thickened muscular wall with a few wide mouth diverticula.  No definitive perforations are identified.  Also received in the same container is a 6.0 cm in length by 0.6 cm in diameter intact unremarkable appendix.  The serosa is smooth and glistening and sectioning reveals a central lumen averaging 0.1 cm.  No perforations, fecaliths or masses are identified.  Additionally received are two anastomotic donuts averaging 2.5 x 2.5 x 2.0 cm which show tan, glistening mucosa and a 20.0 x 9.0 x 4.0 cm sheet of yellow lobular omental fat.  No omental nodules or areas of caking are identified.  Representative sections are submitted as follows:  A - proximal margin, en face; B - distal margin, en face; C-F - diverticula, represented; G - appendix, represented; H - omentum, represented and I - anastomotic donuts, represented.  DALE/jeffy        Microscopic Description --     The slides are reviewed and demonstrate histopathologic features supporting the above rendered diagnosis.          Wound Culture - Wound, Rectal Abscess [[id number]] Collected: 11/03/20 1000    Specimen: Wound from Rectal Abscess Updated: 11/04/20 0725     Wound Culture Growth present, too young to evaluate     Gram Stain Many (4+) Red blood cells      Rare (1+) WBCs seen      Few (2+) Gram  negative bacilli    Basic Metabolic Panel [[id number]]  (Abnormal) Collected: 11/04/20 0438    Specimen: Blood Updated: 11/04/20 0632     Glucose 125 mg/dL      BUN 15 mg/dL      Creatinine 1.75 mg/dL      Sodium 136 mmol/L      Potassium 5.8 mmol/L      Chloride 105 mmol/L      CO2 22.0 mmol/L      Calcium 9.1 mg/dL      eGFR Non African Amer 30 mL/min/1.73      BUN/Creatinine Ratio 8.6     Anion Gap 9.0 mmol/L     Narrative:      GFR Normal >60  Chronic Kidney Disease <60  Kidney Failure <15      CBC & Differential [[id number]]  (Abnormal) Collected: 11/04/20 0438    Specimen: Blood Updated: 11/04/20 0558    Narrative:      The following orders were created for panel order CBC & Differential.  Procedure                               Abnormality         Status                     ---------                               -----------         ------                     CBC Auto Differential[[id number]]        Abnormal            Final result                 Please view results for these tests on the individual orders.    CBC Auto Differential [[id number]]  (Abnormal) Collected: 11/04/20 0438    Specimen: Blood Updated: 11/04/20 0558     WBC 10.29 10*3/mm3      RBC 3.17 10*6/mm3      Hemoglobin 9.6 g/dL      Hematocrit 30.1 %      MCV 95.0 fL      MCH 30.3 pg      MCHC 31.9 g/dL      RDW 13.7 %      RDW-SD 48.3 fl      MPV 12.0 fL      Platelets 201 10*3/mm3      Neutrophil % 83.2 %      Lymphocyte % 9.5 %      Monocyte % 5.2 %      Eosinophil % 1.7 %      Basophil % 0.1 %      Immature Grans % 0.3 %      Neutrophils, Absolute 8.57 10*3/mm3      Lymphocytes, Absolute 0.98 10*3/mm3      Monocytes, Absolute 0.53 10*3/mm3      Eosinophils, Absolute 0.17 10*3/mm3      Basophils, Absolute 0.01 10*3/mm3      Immature Grans, Absolute 0.03 10*3/mm3      nRBC 0.0 /100 WBC     Narrative:      Appended report. These results have been appended to a previously verified report.    Scan Slide [[id number]] Collected: 11/04/20 0438     Specimen: Blood Updated: 11/04/20 0558     Schistocytes Slight/1+     WBC Morphology Normal     Platelet Morphology Normal          I/O last shift:  No intake/output data recorded.     PHYSICAL EXAM-  Comfortable  cv- rsr  Chest- clear, =  Abd- soft, mild distention    Pathology:  Order Name Source Comment Collection Info Order Time   ANAEROBIC CULTURE Rectal Abscess  Collected By: Kojo Barney MD 11/3/2020 10:26 AM   WOUND CULTURE Rectal Abscess  Collected By: Kojo Barney MD 11/3/2020 10:26 AM   TYPE AND SCREEN   Collected By: Denise Grullon, RN 11/3/2020  6:20 AM   TISSUE PATHOLOGY EXAM Large Intestine, Sigmoid Colon  Collected By: Kojo Barney MD 11/3/2020 10:18 AM   .    Assessment and Plan:  Left shift on white count without profound leukocytosis on antibiotic therapy for diverticulitis present on admission.  Region of perforation has been resected.  Plans to DC Olsen in a.m.  Third spacing with inflammation, acute renal injury, continue IV fluids and monitoring volume status, interval lab work including creatinine.    Kojo Barney MD  11/04/20  19:18 EST

## 2020-11-05 NOTE — PLAN OF CARE
Problem: Adult Inpatient Plan of Care  Goal: Plan of Care Review  Recent Flowsheet Documentation  Taken 11/5/2020 1017 by Angela Payton PT  Plan of Care Reviewed With: patient  Outcome Summary: PT evaluation completed.  Pt transferred supine<-->sit with SBA (increased time/effort needed), stood x 2 with CGAx1, and ambulated 60 feet x 2 without AD with CGAx1 - 2 LOB's noted.  Pt reported dizziness and increased nausea with gait and had to sit in rolling chair in the hallway to rest.  Pt able to make it safely back to room, but did vomit (RN notified).  Skilled PT services warranted to improve mobility and safety.

## 2020-11-05 NOTE — PROGRESS NOTES
"[facility]    INPATIENT PROGRESS NOTE  2020      PATIENT NAME: Daja Hubbard  :  1960  MRN:  [id number]  Date of Admission:  11/3/2020      Antimicrobials:  Zosyn    MAR reviewed.     Reason for consultation:  Complicated diverticulitis with abscess    Interval history:  Patient developed nausea, vomiting, and abdominal distention this afternoon.  NG tube was placed with bilious output.  No fevers.  Continues to have serosanguineous fluid out of her NAKITA drain and liquid output from her ileostomy.  Culture from the intra-abdominal abscess back with preliminarily Pseudomonas.    ROS:  No fevers/chills overnight.  No new rashes.  No SOB or chest pain.  Denies side effects from antimicrobials.    Objective:  Temp (24hrs), Av.8 °F (36.6 °C), Min:97.3 °F (36.3 °C), Max:98.6 °F (37 °C)    /87 (BP Location: Right arm, Patient Position: Lying)   Pulse 61   Temp 97.7 °F (36.5 °C) (Oral)   Resp 16   Ht 162.6 cm (64\")   Wt 81.6 kg (180 lb)   SpO2 94%   BMI 30.90 kg/m²     Physical Examination:  GENERAL: Awake and alert, appears a bit uncomfortable.  LINES:  pIV intact.  HEENT: Normocephalic, atraumatic.  EOMI. No conjunctival injection or subconjunctival hemorrhage.  CV: RRR. No murmur, rubs, gallops.  Normal S1S2.  LUNGS: Clear to auscultation bilaterally without wheezing, rales, rhonchi. Normal respiratory effort.  ABDOMEN: Soft, nontender, mildly distended. Positive bowel sounds.  Left lower quadrant drain with serosanguineous fluid.  Right-sided ileostomy in place with liquid output.  Midline surgical dressing in place.  EXT:  No clubbing, cyanosis, or edema.    MSK: No joint effusions or inflammation noted.  SKIN: Warm and dry without rash or ulcer.   NEURO: A&Ox4. No focal deficits.  Face symmetric.  Speech fluent.  Moves all extremities well.   PSYCHIATRIC: Normal insight and judgement.  Cooperative.  Normal affect.       Laboratory Data:    Results from last " 7 days   Lab Units 11/05/20  0438 11/04/20  0438 10/30/20  1143   WBC 10*3/mm3 9.84 10.29 6.46   HEMOGLOBIN g/dL 9.8* 9.6* 11.9*   HEMATOCRIT % 29.8* 30.1* 36.1   PLATELETS 10*3/mm3 216 201 330     Results from last 7 days   Lab Units 11/05/20  0438   SODIUM mmol/L 128*   POTASSIUM mmol/L 4.3   CHLORIDE mmol/L 100   CO2 mmol/L 21.0*   BUN mg/dL 17   CREATININE mg/dL 1.50*   GLUCOSE mg/dL 100*   CALCIUM mg/dL 9.0     Results from last 7 days   Lab Units 10/30/20  1143   ALK PHOS U/L 93   BILIRUBIN mg/dL 0.4   ALT (SGPT) U/L 9   AST (SGOT) U/L 15             Estimated Creatinine Clearance: 41.8 mL/min (A) (by C-G formula based on SCr of 1.5 mg/dL (H)).                    Microbiology:  Microbiology Results (last 10 days)     Procedure Component Value - Date/Time    Wound Culture - Wound, Rectal Abscess [[id number]]  (Abnormal) Collected: 11/03/20 1000    Lab Status: Preliminary result Specimen: Wound from Rectal Abscess Updated: 11/05/20 0907     Wound Culture Rare Pseudomonas species      Scant growth (1+) Normal Urogenital Aruna     Gram Stain Many (4+) Red blood cells      Rare (1+) WBCs seen      Few (2+) Gram negative bacilli    COVID PRE-OP / PRE-PROCEDURE SCREENING ORDER (NO ISOLATION) - Swab, Nasopharynx [[id number]] Collected: 11/01/20 1113    Lab Status: Final result Specimen: Swab from Nasopharynx Updated: 11/01/20 2052    Narrative:      The following orders were created for panel order COVID PRE-OP / PRE-PROCEDURE SCREENING ORDER (NO ISOLATION) - Swab, Nasopharynx.  Procedure                               Abnormality         Status                     ---------                               -----------         ------                     COVID-19,[facility], NP ...[[id number]]                      Final result                 Please view results for these tests on the individual orders.    COVID-19,[facility], NP SWAB IN LEXAR VIRAL TRANSPORT MEDIA 24-30 HR TAT - Swab, Nasopharynx [[id number]] Collected:  11/01/20 1113    Lab Status: Final result Specimen: Swab from Nasopharynx Updated: 11/01/20 2052     SARS-CoV-2 WILLIAN Not Detected            Radiology:  Imaging Results (Last 72 Hours)     ** No results found for the last 72 hours. **          DISCUSSION:  60 y.o. female with history of recurrent diverticulitis who is admitted for planned colon resection.  Operative findings included ongoing inflammation in the sigmoid colon with contained perforation with abscess.  This was drained and culture was obtained.  S/p LAR with loop ileostomy.  Patient had recently completed a 2-week course of ertapenem followed by oral Augmentin.    PROBLEM LIST:   --Complicated sigmoid diverticulitis with contained perforation and abscess, status post surgical drainage.  Cultures prelim Pseudomonas.  --History of recurrent episodes of diverticulitis.  Status post low anterior resection with loop ileostomy 11/3/2020.  --AARTI, new.  Postop.  Improving.  --N/V/distention, suspect post-op ileus, s/p NGT.     PLAN:  --Follow final surgical cultures from abscess drainage; prelim Pseudomonas  --Continue on antibiotics with IV Zosyn for now, should cover for PsA in culture, pending susceptibilities.  --Proceed with PICC tomorrow; expect will need home IV abx again.  --Final abx plans pending clinical course.  --NGT per surgery; changed diet to ice chips.    Plan discussed with patient.    Osmar Arias MD  11/5/2020  17:25 EST

## 2020-11-06 LAB
ANION GAP SERPL CALCULATED.3IONS-SCNC: 9 MMOL/L (ref 5–15)
BACTERIA SPEC AEROBE CULT: ABNORMAL
BACTERIA SPEC AEROBE CULT: ABNORMAL
BASOPHILS # BLD AUTO: 0.01 10*3/MM3 (ref 0–0.2)
BASOPHILS NFR BLD AUTO: 0.1 % (ref 0–1.5)
BUN SERPL-MCNC: 10 MG/DL (ref 6–20)
BUN/CREAT SERPL: 10.1 (ref 7–25)
CALCIUM SPEC-SCNC: 8.5 MG/DL (ref 8.6–10.5)
CHLORIDE SERPL-SCNC: 105 MMOL/L (ref 98–107)
CO2 SERPL-SCNC: 21 MMOL/L (ref 22–29)
CREAT FLD-MCNC: 1.1 MG/DL
CREAT SERPL-MCNC: 0.99 MG/DL (ref 0.57–1)
DEPRECATED RDW RBC AUTO: 46.5 FL (ref 37–54)
EOSINOPHIL # BLD AUTO: 0.16 10*3/MM3 (ref 0–0.4)
EOSINOPHIL NFR BLD AUTO: 1.8 % (ref 0.3–6.2)
ERYTHROCYTE [DISTWIDTH] IN BLOOD BY AUTOMATED COUNT: 13.6 % (ref 12.3–15.4)
GFR SERPL CREATININE-BSD FRML MDRD: 57 ML/MIN/1.73
GLUCOSE SERPL-MCNC: 91 MG/DL (ref 65–99)
GRAM STN SPEC: ABNORMAL
HCT VFR BLD AUTO: 33.2 % (ref 34–46.6)
HGB BLD-MCNC: 10.9 G/DL (ref 12–15.9)
IMM GRANULOCYTES # BLD AUTO: 0.02 10*3/MM3 (ref 0–0.05)
IMM GRANULOCYTES NFR BLD AUTO: 0.2 % (ref 0–0.5)
LYMPHOCYTES # BLD AUTO: 1.79 10*3/MM3 (ref 0.7–3.1)
LYMPHOCYTES NFR BLD AUTO: 19.8 % (ref 19.6–45.3)
MCH RBC QN AUTO: 30.7 PG (ref 26.6–33)
MCHC RBC AUTO-ENTMCNC: 32.8 G/DL (ref 31.5–35.7)
MCV RBC AUTO: 93.5 FL (ref 79–97)
MONOCYTES # BLD AUTO: 0.58 10*3/MM3 (ref 0.1–0.9)
MONOCYTES NFR BLD AUTO: 6.4 % (ref 5–12)
NEUTROPHILS NFR BLD AUTO: 6.49 10*3/MM3 (ref 1.7–7)
NEUTROPHILS NFR BLD AUTO: 71.7 % (ref 42.7–76)
NRBC BLD AUTO-RTO: 0 /100 WBC (ref 0–0.2)
PLATELET # BLD AUTO: 231 10*3/MM3 (ref 140–450)
PMV BLD AUTO: 12.1 FL (ref 6–12)
POTASSIUM SERPL-SCNC: 4.6 MMOL/L (ref 3.5–5.2)
RBC # BLD AUTO: 3.55 10*6/MM3 (ref 3.77–5.28)
SODIUM SERPL-SCNC: 135 MMOL/L (ref 136–145)
WBC # BLD AUTO: 9.05 10*3/MM3 (ref 3.4–10.8)

## 2020-11-06 PROCEDURE — 63710000001 ONDANSETRON PER 8 MG: Performed by: INTERNAL MEDICINE

## 2020-11-06 PROCEDURE — 99232 SBSQ HOSP IP/OBS MODERATE 35: CPT | Performed by: FAMILY MEDICINE

## 2020-11-06 PROCEDURE — 25010000002 HEPARIN (PORCINE) PER 1000 UNITS: Performed by: COLON & RECTAL SURGERY

## 2020-11-06 PROCEDURE — 3E0436Z INTRODUCTION OF NUTRITIONAL SUBSTANCE INTO CENTRAL VEIN, PERCUTANEOUS APPROACH: ICD-10-PCS | Performed by: COLON & RECTAL SURGERY

## 2020-11-06 PROCEDURE — 80048 BASIC METABOLIC PNL TOTAL CA: CPT | Performed by: COLON & RECTAL SURGERY

## 2020-11-06 PROCEDURE — 25010000002 PIPERACILLIN SOD-TAZOBACTAM PER 1 G: Performed by: INTERNAL MEDICINE

## 2020-11-06 PROCEDURE — 02HV33Z INSERTION OF INFUSION DEVICE INTO SUPERIOR VENA CAVA, PERCUTANEOUS APPROACH: ICD-10-PCS | Performed by: COLON & RECTAL SURGERY

## 2020-11-06 PROCEDURE — C1751 CATH, INF, PER/CENT/MIDLINE: HCPCS

## 2020-11-06 PROCEDURE — C1894 INTRO/SHEATH, NON-LASER: HCPCS

## 2020-11-06 PROCEDURE — 25010000002 ONDANSETRON PER 1 MG: Performed by: COLON & RECTAL SURGERY

## 2020-11-06 PROCEDURE — 85025 COMPLETE CBC W/AUTO DIFF WBC: CPT | Performed by: COLON & RECTAL SURGERY

## 2020-11-06 RX ORDER — SODIUM CHLORIDE 0.9 % (FLUSH) 0.9 %
10 SYRINGE (ML) INJECTION EVERY 12 HOURS SCHEDULED
Status: DISCONTINUED | OUTPATIENT
Start: 2020-11-06 | End: 2020-11-10 | Stop reason: HOSPADM

## 2020-11-06 RX ORDER — SODIUM CHLORIDE 0.9 % (FLUSH) 0.9 %
20 SYRINGE (ML) INJECTION AS NEEDED
Status: DISCONTINUED | OUTPATIENT
Start: 2020-11-06 | End: 2020-11-10 | Stop reason: HOSPADM

## 2020-11-06 RX ORDER — SODIUM CHLORIDE 0.9 % (FLUSH) 0.9 %
10 SYRINGE (ML) INJECTION AS NEEDED
Status: DISCONTINUED | OUTPATIENT
Start: 2020-11-06 | End: 2020-11-10 | Stop reason: HOSPADM

## 2020-11-06 RX ADMIN — ONDANSETRON HYDROCHLORIDE 4 MG: 4 TABLET, FILM COATED ORAL at 16:16

## 2020-11-06 RX ADMIN — LOSARTAN POTASSIUM 50 MG: 25 TABLET, FILM COATED ORAL at 08:18

## 2020-11-06 RX ADMIN — SODIUM CHLORIDE 125 ML/HR: 9 INJECTION, SOLUTION INTRAVENOUS at 18:32

## 2020-11-06 RX ADMIN — Medication 1 CAPSULE: at 08:18

## 2020-11-06 RX ADMIN — SODIUM CHLORIDE, PRESERVATIVE FREE 10 ML: 5 INJECTION INTRAVENOUS at 19:58

## 2020-11-06 RX ADMIN — HYDROCODONE BITARTRATE AND ACETAMINOPHEN 1 TABLET: 7.5; 325 TABLET ORAL at 08:17

## 2020-11-06 RX ADMIN — ACETAMINOPHEN 650 MG: 325 TABLET, FILM COATED ORAL at 19:56

## 2020-11-06 RX ADMIN — ONDANSETRON HYDROCHLORIDE 4 MG: 2 SOLUTION INTRAMUSCULAR; INTRAVENOUS at 01:44

## 2020-11-06 RX ADMIN — TAZOBACTAM SODIUM AND PIPERACILLIN SODIUM 3.38 G: 375; 3 INJECTION, SOLUTION INTRAVENOUS at 05:12

## 2020-11-06 RX ADMIN — CETIRIZINE HYDROCHLORIDE 5 MG: 10 TABLET, FILM COATED ORAL at 08:17

## 2020-11-06 RX ADMIN — HYDROCODONE BITARTRATE AND ACETAMINOPHEN 1 TABLET: 7.5; 325 TABLET ORAL at 16:16

## 2020-11-06 RX ADMIN — CITALOPRAM HYDROBROMIDE 40 MG: 40 TABLET ORAL at 08:17

## 2020-11-06 RX ADMIN — FAMOTIDINE 20 MG: 20 TABLET, FILM COATED ORAL at 08:18

## 2020-11-06 RX ADMIN — SODIUM CHLORIDE, PRESERVATIVE FREE 10 ML: 5 INJECTION INTRAVENOUS at 14:02

## 2020-11-06 RX ADMIN — ONDANSETRON HYDROCHLORIDE 4 MG: 2 SOLUTION INTRAMUSCULAR; INTRAVENOUS at 08:19

## 2020-11-06 RX ADMIN — HYDROCODONE BITARTRATE AND ACETAMINOPHEN 1 TABLET: 7.5; 325 TABLET ORAL at 19:56

## 2020-11-06 RX ADMIN — TAZOBACTAM SODIUM AND PIPERACILLIN SODIUM 3.38 G: 375; 3 INJECTION, SOLUTION INTRAVENOUS at 14:01

## 2020-11-06 RX ADMIN — ACETAMINOPHEN 650 MG: 325 TABLET, FILM COATED ORAL at 14:01

## 2020-11-06 RX ADMIN — DIAZEPAM 5 MG: 5 TABLET ORAL at 19:56

## 2020-11-06 RX ADMIN — ACETAMINOPHEN 650 MG: 325 TABLET, FILM COATED ORAL at 05:12

## 2020-11-06 RX ADMIN — HEPARIN SODIUM 5000 UNITS: 5000 INJECTION, SOLUTION INTRAVENOUS; SUBCUTANEOUS at 05:12

## 2020-11-06 RX ADMIN — HEPARIN SODIUM 5000 UNITS: 5000 INJECTION, SOLUTION INTRAVENOUS; SUBCUTANEOUS at 14:01

## 2020-11-06 RX ADMIN — GABAPENTIN 300 MG: 300 CAPSULE ORAL at 08:17

## 2020-11-06 RX ADMIN — HEPARIN SODIUM 5000 UNITS: 5000 INJECTION, SOLUTION INTRAVENOUS; SUBCUTANEOUS at 19:56

## 2020-11-06 RX ADMIN — FAMOTIDINE 20 MG: 20 TABLET, FILM COATED ORAL at 19:57

## 2020-11-06 RX ADMIN — TAZOBACTAM SODIUM AND PIPERACILLIN SODIUM 3.38 G: 375; 3 INJECTION, SOLUTION INTRAVENOUS at 21:00

## 2020-11-06 NOTE — CONSULTS
Placed by Bernice Carrillo RN - confirmed with 3cg.  RUE double lumen PICC with 38cm total and 0cm exposed.

## 2020-11-06 NOTE — PLAN OF CARE
Problem: Adult Inpatient Plan of Care  Goal: Plan of Care Review  Outcome: Ongoing, Progressing  Flowsheets (Taken 11/6/2020 0434)  Progress: no change  Plan of Care Reviewed With: patient  Goal: Patient-Specific Goal (Individualized)  Outcome: Ongoing, Progressing  Goal: Absence of Hospital-Acquired Illness or Injury  Outcome: Ongoing, Progressing  Intervention: Identify and Manage Fall Risk  Recent Flowsheet Documentation  Taken 11/6/2020 0200 by Nurys Wong, RN  Safety Promotion/Fall Prevention:   activity supervised   assistive device/personal items within reach   clutter free environment maintained   fall prevention program maintained   nonskid shoes/slippers when out of bed   room organization consistent   safety round/check completed   toileting scheduled  Taken 11/6/2020 0000 by Nurys Wong, RN  Safety Promotion/Fall Prevention:   activity supervised   assistive device/personal items within reach   clutter free environment maintained   fall prevention program maintained   nonskid shoes/slippers when out of bed   room organization consistent   safety round/check completed   toileting scheduled  Taken 11/5/2020 2200 by Nurys Wong, RN  Safety Promotion/Fall Prevention:   activity supervised   assistive device/personal items within reach   clutter free environment maintained   fall prevention program maintained   nonskid shoes/slippers when out of bed   room organization consistent   safety round/check completed   toileting scheduled  Taken 11/5/2020 2000 by Nurys Wong, RN  Safety Promotion/Fall Prevention:   activity supervised   assistive device/personal items within reach   clutter free environment maintained   fall prevention program maintained   lighting adjusted   nonskid shoes/slippers when out of bed   room organization consistent   safety round/check completed   toileting scheduled  Intervention: Prevent Skin Injury  Recent Flowsheet Documentation  Taken 11/6/2020 0200 by Nurys Wong,  RN  Body Position: position changed independently  Taken 11/6/2020 0000 by Nurys Wong RN  Body Position: position changed independently  Taken 11/5/2020 2200 by Nurys Wong RN  Body Position: supine  Taken 11/5/2020 2000 by Nurys Wong RN  Body Position: side-lying, left  Intervention: Prevent and Manage VTE (venous thromboembolism) Risk  Recent Flowsheet Documentation  Taken 11/5/2020 2000 by Nurys Wong RN  VTE Prevention/Management:   bilateral   patient refused intervention  Intervention: Prevent Infection  Recent Flowsheet Documentation  Taken 11/6/2020 0200 by Nurys Wong RN  Infection Prevention:   visitors restricted/screened   single patient room provided   rest/sleep promoted   hand hygiene promoted   environmental surveillance performed  Taken 11/6/2020 0000 by Nurys Wong RN  Infection Prevention:   visitors restricted/screened   single patient room provided   rest/sleep promoted   hand hygiene promoted   environmental surveillance performed  Taken 11/5/2020 2200 by Nurys Wong RN  Infection Prevention:   single patient room provided   visitors restricted/screened   rest/sleep promoted   hand hygiene promoted   environmental surveillance performed  Taken 11/5/2020 2000 by Nurys Wong RN  Infection Prevention:   rest/sleep promoted   single patient room provided   visitors restricted/screened   hand hygiene promoted   environmental surveillance performed  Goal: Optimal Comfort and Wellbeing  Outcome: Ongoing, Progressing  Intervention: Provide Person-Centered Care  Recent Flowsheet Documentation  Taken 11/5/2020 2000 by Nurys Wong RN  Trust Relationship/Rapport:   care explained   choices provided   emotional support provided   empathic listening provided   questions answered   questions encouraged   reassurance provided   thoughts/feelings acknowledged  Goal: Readiness for Transition of Care  Outcome: Ongoing, Progressing     Problem: Adjustment to Surgery  (Ileostomy)  Goal: Psychosocial Adjustment Initiation  Outcome: Ongoing, Progressing     Problem: Bleeding (Ileostomy)  Goal: Absence of Bleeding  Outcome: Ongoing, Progressing     Problem: Fluid Imbalance (Ileostomy)  Goal: Fluid Balance  Outcome: Ongoing, Progressing     Problem: Infection (Ileostomy)  Goal: Absence of Infection Signs and Symptoms  Outcome: Ongoing, Progressing     Problem: Ongoing Anesthesia Effects (Ileostomy)  Goal: Anesthesia/Sedation Recovery  Outcome: Ongoing, Progressing  Intervention: Optimize Anesthesia Recovery  Recent Flowsheet Documentation  Taken 11/6/2020 0200 by Nurys Wong, RN  Safety Promotion/Fall Prevention:   activity supervised   assistive device/personal items within reach   clutter free environment maintained   fall prevention program maintained   nonskid shoes/slippers when out of bed   room organization consistent   safety round/check completed   toileting scheduled  Taken 11/6/2020 0000 by Nurys Wong, RN  Safety Promotion/Fall Prevention:   activity supervised   assistive device/personal items within reach   clutter free environment maintained   fall prevention program maintained   nonskid shoes/slippers when out of bed   room organization consistent   safety round/check completed   toileting scheduled  Taken 11/5/2020 2200 by Nurys Wong, RN  Safety Promotion/Fall Prevention:   activity supervised   assistive device/personal items within reach   clutter free environment maintained   fall prevention program maintained   nonskid shoes/slippers when out of bed   room organization consistent   safety round/check completed   toileting scheduled  Taken 11/5/2020 2000 by Nurys Wong, RN  Safety Promotion/Fall Prevention:   activity supervised   assistive device/personal items within reach   clutter free environment maintained   fall prevention program maintained   lighting adjusted   nonskid shoes/slippers when out of bed   room organization consistent   safety  round/check completed   toileting scheduled     Problem: Pain (Ileostomy)  Goal: Acceptable Pain Control  Outcome: Ongoing, Progressing     Problem: Postoperative Nausea and Vomiting (Ileostomy)  Goal: Nausea and Vomiting Relief  Outcome: Ongoing, Progressing     Problem: Postoperative Stoma Care (Ileostomy)  Goal: Optimal Stoma Healing  Outcome: Ongoing, Progressing  Intervention: Provide Ostomy and Peristomal Skin Care  Recent Flowsheet Documentation  Taken 11/6/2020 0200 by Nurys Wong, RN  Dehiscence Prevention/Management: wound/incision splinting encouraged  Skin Protection:   adhesive use limited   incontinence pads utilized   tubing/devices free from skin contact  Taken 11/6/2020 0000 by Nurys Wong, RN  Dehiscence Prevention/Management: wound/incision splinting encouraged  Taken 11/5/2020 2200 by Nurys Wong, RN  Dehiscence Prevention/Management: wound/incision splinting encouraged  Taken 11/5/2020 2000 by Nurys Wong, RN  Dehiscence Prevention/Management: wound/incision splinting encouraged     Problem: Postoperative Urinary Retention (Ileostomy)  Goal: Effective Urinary Elimination  Outcome: Ongoing, Progressing   Goal Outcome Evaluation:  Plan of Care Reviewed With: patient  Progress: no change

## 2020-11-06 NOTE — PROGRESS NOTES
"[facility] ([facility])      Sleepy, lights out  Has not walked today out of the room    Vital Signs:  Blood pressure 128/85, pulse 63, temperature 98.2 °F (36.8 °C), temperature source Oral, resp. rate 18, height 162.6 cm (64\"), weight 81.6 kg (180 lb), SpO2 93 %.    Labs past 24 hours:  Lab Results (last 24 hours)     Procedure Component Value Units Date/Time    Anaerobic Culture - Wound, Rectal Abscess [[id number]] Collected: 11/03/20 1000    Specimen: Wound from Rectal Abscess Updated: 11/06/20 1056     Anaerobic Culture Screening for Anaerobes    Wound Culture - Wound, Rectal Abscess [[id number]]  (Abnormal)  (Susceptibility) Collected: 11/03/20 1000    Specimen: Wound from Rectal Abscess Updated: 11/06/20 0932     Wound Culture Rare Pseudomonas aeruginosa      Scant growth (1+) Normal Urogenital Aruna     Gram Stain Many (4+) Red blood cells      Rare (1+) WBCs seen      Few (2+) Gram negative bacilli    Susceptibility      Pseudomonas aeruginosa     JONO     Cefepime Susceptible     Ceftazidime Susceptible     Ciprofloxacin Susceptible     Gentamicin Susceptible     Levofloxacin Susceptible     Piperacillin + Tazobactam Susceptible                    Basic Metabolic Panel [[id number]]  (Abnormal) Collected: 11/06/20 0449    Specimen: Blood Updated: 11/06/20 0606     Glucose 91 mg/dL      BUN 10 mg/dL      Creatinine 0.99 mg/dL      Sodium 135 mmol/L      Potassium 4.6 mmol/L      Chloride 105 mmol/L      CO2 21.0 mmol/L      Calcium 8.5 mg/dL      eGFR Non African Amer 57 mL/min/1.73      BUN/Creatinine Ratio 10.1     Anion Gap 9.0 mmol/L     Narrative:      GFR Normal >60  Chronic Kidney Disease <60  Kidney Failure <15      CBC & Differential [[id number]]  (Abnormal) Collected: 11/06/20 0449    Specimen: Blood Updated: 11/06/20 0546    Narrative:      The following orders were created for panel order CBC & Differential.  Procedure                               Abnormality         " Status                     ---------                               -----------         ------                     CBC Auto Differential[[id number]]        Abnormal            Final result                 Please view results for these tests on the individual orders.    CBC Auto Differential [[id number]]  (Abnormal) Collected: 11/06/20 0449    Specimen: Blood Updated: 11/06/20 0546     WBC 9.05 10*3/mm3      RBC 3.55 10*6/mm3      Hemoglobin 10.9 g/dL      Hematocrit 33.2 %      MCV 93.5 fL      MCH 30.7 pg      MCHC 32.8 g/dL      RDW 13.6 %      RDW-SD 46.5 fl      MPV 12.1 fL      Platelets 231 10*3/mm3      Neutrophil % 71.7 %      Lymphocyte % 19.8 %      Monocyte % 6.4 %      Eosinophil % 1.8 %      Basophil % 0.1 %      Immature Grans % 0.2 %      Neutrophils, Absolute 6.49 10*3/mm3      Lymphocytes, Absolute 1.79 10*3/mm3      Monocytes, Absolute 0.58 10*3/mm3      Eosinophils, Absolute 0.16 10*3/mm3      Basophils, Absolute 0.01 10*3/mm3      Immature Grans, Absolute 0.02 10*3/mm3      nRBC 0.0 /100 WBC     Creatinine, Body Fluid - Body Fluid, Peritoneum [[id number]] Collected: 11/05/20 1648    Specimen: Body Fluid from Peritoneum Updated: 11/06/20 0018     Creatinine, Fluid 1.1 mg/dL     Narrative:      No Reference Ranges Established.  This test was developed, it performance characteristics determined and judged suitable for clinical purposes by [facility].  It has not been cleared or approved by the FDA.  The laboratory is regulated under CLIA as qualified to perform high-complexity testing.           I/O last shift:  I/O this shift:  In: -   Out: 575 [Urine:400; Drains:125; Stool:50]     PHYSICAL EXAM-  cv- rsr  Chest- clear, =  Abd- soft, mild distention, without significant stoma output    Pathology:  Order Name Source Comment Collection Info Order Time   ANAEROBIC CULTURE Rectal Abscess  Collected By: Kojo Barney MD 11/3/2020 10:26 AM   WOUND CULTURE Rectal Abscess  Collected  By: Kojo Barney MD 11/3/2020 10:26 AM   TYPE AND SCREEN   Collected By: Denise Grullon, RN 11/3/2020  6:20 AM   TISSUE PATHOLOGY EXAM Large Intestine, Sigmoid Colon  Collected By: Kojo Barney MD 11/3/2020 10:18 AM   .    Assessment and Plan:  NG clamping trials  Clear liquid diet with protein supplements  TPN, PICC already in place for antibiotic therapy  Ambulation encouraged, patient states that not enough nursing help, nurses state patient not motivated---have encouraged patient to walk is the key to further recovery.    Kojo Barney MD  11/06/20  17:52 EST

## 2020-11-06 NOTE — PLAN OF CARE
Goal Outcome Evaluation:  Plan of Care Reviewed With: patient  Progress: no change  Outcome Summary: Pt has complaints of pain and nausea. PRN medications were administered. VSS, room air. Pt rested most of the shift. No concerns at this time. Will continue to monitor.

## 2020-11-06 NOTE — PROGRESS NOTES
Continued Stay Note  [facility]     Patient Name: Daja Hubbard  MRN: [id number]  Today's Date: 11/6/2020    Admit Date: 11/3/2020    Discharge Plan     Row Name 11/06/20 1502       Plan    Plan  Home    Plan Comments  D/w MD in am rounds. Patient now has an NGT, she is also on IV abx. Per ID note, may needs OP IV abx, final recs tbd closer to dc. Plan is home. CM will assist w/ arranging outpatient abx as needed.    Final Discharge Disposition Code  01 - home or self-care        Discharge Codes    No documentation.             Olamide Arreola RN

## 2020-11-06 NOTE — NURSING NOTE
WOC follow-up:     Appliance change performed r/t leaking at medial bridge site.   There is still a lot of tension on her stomal bridge.   Will leave in place at this time.   Cut bridge sutures in order to place appliance underneath and prevent leaking.     Stoma is viable and swollen.   Peristomal skin is intact.   Stomal care performed.   Placed her in a [employer] 8331.     Scant stomal output.   NG-tube in place.  Patient did not participate in care.    [facility] will plan to meet with patient and spouse tomorrow AM for extensive stomal education.     Encouragement provided and encouraged ambulation.     Thanks

## 2020-11-06 NOTE — PROGRESS NOTES
[facility] Medicine Services  PROGRESS NOTE    Patient Name: Daja Hubbard  : 1960  MRN: [id number]    Date of Admission: 11/3/2020  Primary Care Physician: Sp Marie MD    Subjective   Subjective     CC:  F/U diverticulitis    HPI:  Patient is resting in bed. She says she is in a lot of pain. She says she woke up in pain. She is less nauseated since NG was placed.     Review of Systems  Gen- No fevers, chills  CV- No chest pain, palpitations  Resp- No cough, dyspnea  GI- + N/V NO /D, +abd pain        Objective   Objective     Vital Signs:   Temp:  [97.7 °F (36.5 °C)-98.2 °F (36.8 °C)] 97.9 °F (36.6 °C)  Heart Rate:  [58-87] 58  Resp:  [16-18] 18  BP: (147-177)/(83-97) 171/92        Physical Exam:  Constitutional: No acute distress, awake, alert. Slightly diaphoretic   HENT: NCAT, mucous membranes moist, NG in place   Respiratory: Clear to auscultation bilaterally, respiratory effort normal   Cardiovascular: RRR, no murmurs, rubs, or gallops,  Gastrointestinal: decreased  bowel sounds, soft, ostomy in place   Musculoskeletal: No bilateral ankle edema  Psychiatric: Appropriate affect, cooperative  Neurologic: Oriented x 3, strength symmetric in all extremities, Cranial Nerves grossly intact to confrontation, speech clear  Skin: No rashes      Results Reviewed:  Results from last 7 days   Lab Units 20   WBC 10*3/mm3 9.05 9.84 10.29   HEMOGLOBIN g/dL 10.9* 9.8* 9.6*   HEMATOCRIT % 33.2* 29.8* 30.1*   PLATELETS 10*3/mm3 231 216 201     Results from last 7 days   Lab Units 208 20  1721  10/30/20  1143   SODIUM mmol/L 135* 128* 128*   < > 137   POTASSIUM mmol/L 4.6 4.3 5.0   < > 4.7   CHLORIDE mmol/L 105 100 100   < > 104   CO2 mmol/L 21.0* 21.0* 19.0*   < > 23.0   BUN mg/dL 10 17 17   < > 9   CREATININE mg/dL 0.99 1.50* 1.55*   < > 0.85   GLUCOSE mg/dL 91 100* 122*   < > 98   CALCIUM mg/dL 8.5* 9.0 9.3    < > 9.9   ALT (SGPT) U/L  --   --   --   --  9   AST (SGOT) U/L  --   --   --   --  15    < > = values in this interval not displayed.     Estimated Creatinine Clearance: 63.3 mL/min (by C-G formula based on SCr of 0.99 mg/dL).    Microbiology Results Abnormal     Procedure Component Value - Date/Time    Wound Culture - Wound, Rectal Abscess [[id number]]  (Abnormal) Collected: 11/03/20 1000    Lab Status: Preliminary result Specimen: Wound from Rectal Abscess Updated: 11/05/20 0907     Wound Culture Rare Pseudomonas species      Scant growth (1+) Normal Urogenital Aruna     Gram Stain Many (4+) Red blood cells      Rare (1+) WBCs seen      Few (2+) Gram negative bacilli          Imaging Results (Last 24 Hours)     ** No results found for the last 24 hours. **               I have reviewed the medications:  Scheduled Meds:acetaminophen, 650 mg, Oral, Q8H  cetirizine, 5 mg, Oral, Daily  citalopram, 40 mg, Oral, Daily  famotidine, 20 mg, Oral, BID  gabapentin, 300 mg, Oral, TID  heparin (porcine), 5,000 Units, Subcutaneous, Q8H  lactobacillus acidophilus, 1 capsule, Oral, Daily  losartan, 50 mg, Oral, Daily  piperacillin-tazobactam, 3.375 g, Intravenous, Q8H  sodium chloride, 10 mL, Intravenous, Q12H      Continuous Infusions:sodium chloride, 125 mL/hr, Last Rate: 125 mL/hr (11/05/20 1451)      PRN Meds:.diazePAM  •  HYDROcodone-acetaminophen  •  HYDROmorphone **AND** naloxone  •  magnesium sulfate **OR** magnesium sulfate **OR** magnesium sulfate  •  ondansetron  •  ondansetron **OR** ondansetron  •  potassium chloride **OR** potassium chloride **OR** potassium chloride  •  sodium chloride    Assessment/Plan   Assessment & Plan     Active Hospital Problems    Diagnosis  POA   • **Diverticulitis [K57.92]  Yes   • Colonic diverticular abscess [K57.20]  Yes   • Essential hypertension [I10]  Yes      Resolved Hospital Problems   No resolved problems to display.        Brief Hospital Course to date:  Daja Hubbard is a  60 y.o. female with PMH of HTN admitted for lap colon resection and drianage of sigmoid abscess due to diverticulitis.     Diveritcular Abscess  S/p lap colon resection, abscess drainage  -Dr Arias following, wound culture rare pseudomonas  -Zosyn   -anticipate needing PICC and outpatient infusion for a couple of weeks  -Dr jackson following   -dr Chang performed cystoscopy and placed tse     AARTI-resolved  Hyponatremia-improving/resolved  Hyperkalemia-resolved   -cr 0.99  -stop toradol   -Na is 135  -NS is @ 125 but she is no longer taking in po   -repeat BMP     HTN  - losartan      Depression  -celexa    DVT Prophylaxis:  Heparin SC       Disposition: I expect the patient to be discharged TBD.     CODE STATUS:   Code Status and Medical Interventions:   Ordered at: 11/03/20 1059     Level Of Support Discussed With:    Patient     Code Status:    CPR     Medical Interventions (Level of Support Prior to Arrest):    Full       Nurys Lo, DO  11/06/20

## 2020-11-06 NOTE — PROGRESS NOTES
"[facility]    INPATIENT PROGRESS NOTE  2020      PATIENT NAME: Daja Hubbard  :  1960  MRN:  [id number]  Date of Admission:  11/3/2020      Antimicrobials:  Zosyn    MAR reviewed.     Reason for consultation:  Complicated diverticulitis with abscess    Interval history: NG tube remains in place, currently clamped.  No fevers.  She feels a bit less distended and less nauseated today.  Taking some ice chips.  PICC placed.    ROS:  No fevers/chills overnight.  No new rashes.  No SOB or chest pain.  Denies side effects from antimicrobials.    Objective:  Temp (24hrs), Av °F (36.7 °C), Min:97.7 °F (36.5 °C), Max:98.2 °F (36.8 °C)    /83 (BP Location: Left arm, Patient Position: Lying)   Pulse 62   Temp 98.2 °F (36.8 °C) (Oral)   Resp 18   Ht 162.6 cm (64\")   Wt 81.6 kg (180 lb)   SpO2 93%   BMI 30.90 kg/m²     Physical Examination:  GENERAL: Awake and alert, appears a bit uncomfortable.  LINES:  New right arm PICC.  HEENT: Normocephalic, atraumatic.  EOMI. No conjunctival injection or subconjunctival hemorrhage.  NGT in place.  CV: RRR. No murmur, rubs, gallops.  Normal S1S2.  LUNGS: Clear to auscultation bilaterally without wheezing, rales, rhonchi. Normal respiratory effort.  ABDOMEN: Soft, nontender, mildly distended. Positive bowel sounds.  Left lower quadrant drain with serosanguineous fluid.  Right-sided ileostomy in place with liquid output.  Midline surgical dressing in place, clean.  EXT:  No clubbing, cyanosis, or edema.    MSK: No joint effusions or inflammation noted.  SKIN: Warm and dry without rash or ulcer.   NEURO: A&Ox4. No focal deficits.  Face symmetric.  Speech fluent.  Moves all extremities well.   PSYCHIATRIC: Normal insight and judgement.  Cooperative.  Normal affect.       Laboratory Data:    Results from last 7 days   Lab Units 20  0449 20  0438 20   WBC 10*3/mm3 9.05 9.84 10.29   HEMOGLOBIN g/dL 10.9* 9.8* " 9.6*   HEMATOCRIT % 33.2* 29.8* 30.1*   PLATELETS 10*3/mm3 231 216 201     Results from last 7 days   Lab Units 11/06/20  0449   SODIUM mmol/L 135*   POTASSIUM mmol/L 4.6   CHLORIDE mmol/L 105   CO2 mmol/L 21.0*   BUN mg/dL 10   CREATININE mg/dL 0.99   GLUCOSE mg/dL 91   CALCIUM mg/dL 8.5*                 Estimated Creatinine Clearance: 63.3 mL/min (by C-G formula based on SCr of 0.99 mg/dL).                    Microbiology:  Microbiology Results (last 10 days)     Procedure Component Value - Date/Time    Anaerobic Culture - Wound, Rectal Abscess [[id number]] Collected: 11/03/20 1000    Lab Status: Preliminary result Specimen: Wound from Rectal Abscess Updated: 11/06/20 1056     Anaerobic Culture Screening for Anaerobes    Wound Culture - Wound, Rectal Abscess [[id number]]  (Abnormal)  (Susceptibility) Collected: 11/03/20 1000    Lab Status: Final result Specimen: Wound from Rectal Abscess Updated: 11/06/20 0932     Wound Culture Rare Pseudomonas aeruginosa      Scant growth (1+) Normal Urogenital Aruna     Gram Stain Many (4+) Red blood cells      Rare (1+) WBCs seen      Few (2+) Gram negative bacilli    Susceptibility      Pseudomonas aeruginosa     JONO     Cefepime Susceptible     Ceftazidime Susceptible     Ciprofloxacin Susceptible     Gentamicin Susceptible     Levofloxacin Susceptible     Piperacillin + Tazobactam Susceptible                    COVID PRE-OP / PRE-PROCEDURE SCREENING ORDER (NO ISOLATION) - Swab, Nasopharynx [[id number]] Collected: 11/01/20 1113    Lab Status: Final result Specimen: Swab from Nasopharynx Updated: 11/01/20 2052    Narrative:      The following orders were created for panel order COVID PRE-OP / PRE-PROCEDURE SCREENING ORDER (NO ISOLATION) - Swab, Nasopharynx.  Procedure                               Abnormality         Status                     ---------                               -----------         ------                     COVID-MAGDIEL Connolly, NP ...[[id number]]                       Final result                 Please view results for these tests on the individual orders.    COVID-19,[facility], NP SWAB IN [facility]EX[facility] VIRAL TRANSPORT MEDIA 24-30 HR TAT - Swab, Nasopharynx [[id number]] Collected: 11/01/20 1113    Lab Status: Final result Specimen: Swab from Nasopharynx Updated: 11/01/20 2052     SARS-CoV-2 WILLIAN Not Detected            Radiology:  Imaging Results (Last 72 Hours)     ** No results found for the last 72 hours. **          DISCUSSION:  60 y.o. female with history of recurrent diverticulitis who is admitted for planned colon resection.  Operative findings included ongoing inflammation in the sigmoid colon with contained perforation with abscess.  This was drained and culture was obtained.  S/p LAR with loop ileostomy.  Patient had recently completed a 2-week course of ertapenem followed by oral Augmentin.    PROBLEM LIST:   --Complicated sigmoid diverticulitis with contained perforation and abscess, status post surgical drainage.  Culture with pansensitive Pseudomonas.  --History of recurrent episodes of diverticulitis.  Status post low anterior resection with loop ileostomy 11/3/2020.  --AARTI, new.  Postop.  Improving.  --N/V/distention, suspect post-op ileus, s/p NGT.     PLAN:  --Continue Zosyn  --PICC in place  --Abx through 11/17/20  --NGT and diet management per surgery  --Will arrange home infusion closer to discharge    Plan discussed with patient.    Osmar Arias MD  11/6/2020  14:07 EST

## 2020-11-07 LAB
ALBUMIN SERPL-MCNC: 2.8 G/DL (ref 3.5–5.2)
ALBUMIN/GLOB SERPL: 1.4 G/DL
ALP SERPL-CCNC: 55 U/L (ref 39–117)
ALT SERPL W P-5'-P-CCNC: 5 U/L (ref 1–33)
ANION GAP SERPL CALCULATED.3IONS-SCNC: 13 MMOL/L (ref 5–15)
ANION GAP SERPL CALCULATED.3IONS-SCNC: 8 MMOL/L (ref 5–15)
AST SERPL-CCNC: 12 U/L (ref 1–32)
BASOPHILS # BLD AUTO: 0.02 10*3/MM3 (ref 0–0.2)
BASOPHILS NFR BLD AUTO: 0.3 % (ref 0–1.5)
BILIRUB SERPL-MCNC: 0.2 MG/DL (ref 0–1.2)
BUN SERPL-MCNC: 9 MG/DL (ref 6–20)
BUN SERPL-MCNC: 9 MG/DL (ref 6–20)
BUN/CREAT SERPL: 10.2 (ref 7–25)
BUN/CREAT SERPL: 10.8 (ref 7–25)
CALCIUM SPEC-SCNC: 8.1 MG/DL (ref 8.6–10.5)
CALCIUM SPEC-SCNC: 8.3 MG/DL (ref 8.6–10.5)
CHLORIDE SERPL-SCNC: 106 MMOL/L (ref 98–107)
CHLORIDE SERPL-SCNC: 107 MMOL/L (ref 98–107)
CHOLEST SERPL-MCNC: 127 MG/DL (ref 0–200)
CO2 SERPL-SCNC: 17 MMOL/L (ref 22–29)
CO2 SERPL-SCNC: 20 MMOL/L (ref 22–29)
CREAT SERPL-MCNC: 0.83 MG/DL (ref 0.57–1)
CREAT SERPL-MCNC: 0.88 MG/DL (ref 0.57–1)
DEPRECATED RDW RBC AUTO: 49.8 FL (ref 37–54)
EOSINOPHIL # BLD AUTO: 0.28 10*3/MM3 (ref 0–0.4)
EOSINOPHIL NFR BLD AUTO: 3.6 % (ref 0.3–6.2)
ERYTHROCYTE [DISTWIDTH] IN BLOOD BY AUTOMATED COUNT: 13.9 % (ref 12.3–15.4)
GFR SERPL CREATININE-BSD FRML MDRD: 66 ML/MIN/1.73
GFR SERPL CREATININE-BSD FRML MDRD: 70 ML/MIN/1.73
GLOBULIN UR ELPH-MCNC: 2 GM/DL
GLUCOSE BLDC GLUCOMTR-MCNC: 102 MG/DL (ref 70–130)
GLUCOSE SERPL-MCNC: 85 MG/DL (ref 65–99)
GLUCOSE SERPL-MCNC: 85 MG/DL (ref 65–99)
HCT VFR BLD AUTO: 31.8 % (ref 34–46.6)
HGB BLD-MCNC: 10 G/DL (ref 12–15.9)
IMM GRANULOCYTES # BLD AUTO: 0.02 10*3/MM3 (ref 0–0.05)
IMM GRANULOCYTES NFR BLD AUTO: 0.3 % (ref 0–0.5)
LYMPHOCYTES # BLD AUTO: 2.65 10*3/MM3 (ref 0.7–3.1)
LYMPHOCYTES NFR BLD AUTO: 33.7 % (ref 19.6–45.3)
MAGNESIUM SERPL-MCNC: 1.7 MG/DL (ref 1.6–2.6)
MCH RBC QN AUTO: 30.5 PG (ref 26.6–33)
MCHC RBC AUTO-ENTMCNC: 31.4 G/DL (ref 31.5–35.7)
MCV RBC AUTO: 97 FL (ref 79–97)
MONOCYTES # BLD AUTO: 0.61 10*3/MM3 (ref 0.1–0.9)
MONOCYTES NFR BLD AUTO: 7.8 % (ref 5–12)
NEUTROPHILS NFR BLD AUTO: 4.28 10*3/MM3 (ref 1.7–7)
NEUTROPHILS NFR BLD AUTO: 54.3 % (ref 42.7–76)
NRBC BLD AUTO-RTO: 0 /100 WBC (ref 0–0.2)
PHOSPHATE SERPL-MCNC: 3 MG/DL (ref 2.5–4.5)
PLATELET # BLD AUTO: 213 10*3/MM3 (ref 140–450)
PMV BLD AUTO: 11.5 FL (ref 6–12)
POTASSIUM SERPL-SCNC: 4.2 MMOL/L (ref 3.5–5.2)
POTASSIUM SERPL-SCNC: 4.5 MMOL/L (ref 3.5–5.2)
PROT SERPL-MCNC: 4.8 G/DL (ref 6–8.5)
RBC # BLD AUTO: 3.28 10*6/MM3 (ref 3.77–5.28)
SODIUM SERPL-SCNC: 135 MMOL/L (ref 136–145)
SODIUM SERPL-SCNC: 136 MMOL/L (ref 136–145)
TRIGL SERPL-MCNC: 109 MG/DL (ref 0–150)
WBC # BLD AUTO: 7.86 10*3/MM3 (ref 3.4–10.8)

## 2020-11-07 PROCEDURE — 80053 COMPREHEN METABOLIC PANEL: CPT

## 2020-11-07 PROCEDURE — 97110 THERAPEUTIC EXERCISES: CPT

## 2020-11-07 PROCEDURE — 80048 BASIC METABOLIC PNL TOTAL CA: CPT | Performed by: FAMILY MEDICINE

## 2020-11-07 PROCEDURE — 82962 GLUCOSE BLOOD TEST: CPT

## 2020-11-07 PROCEDURE — 83735 ASSAY OF MAGNESIUM: CPT

## 2020-11-07 PROCEDURE — 84100 ASSAY OF PHOSPHORUS: CPT

## 2020-11-07 PROCEDURE — 25010000002 HEPARIN (PORCINE) PER 1000 UNITS: Performed by: COLON & RECTAL SURGERY

## 2020-11-07 PROCEDURE — 25010000002 PIPERACILLIN SOD-TAZOBACTAM PER 1 G: Performed by: INTERNAL MEDICINE

## 2020-11-07 PROCEDURE — 63710000001 ONDANSETRON PER 8 MG: Performed by: INTERNAL MEDICINE

## 2020-11-07 PROCEDURE — 85025 COMPLETE CBC W/AUTO DIFF WBC: CPT | Performed by: FAMILY MEDICINE

## 2020-11-07 PROCEDURE — 82465 ASSAY BLD/SERUM CHOLESTEROL: CPT

## 2020-11-07 PROCEDURE — 25010000002 MAGNESIUM SULFATE PER 500 MG OF MAGNESIUM

## 2020-11-07 PROCEDURE — 84478 ASSAY OF TRIGLYCERIDES: CPT

## 2020-11-07 PROCEDURE — 97116 GAIT TRAINING THERAPY: CPT

## 2020-11-07 PROCEDURE — 25010000002 POTASSIUM CHLORIDE PER 2 MEQ OF POTASSIUM

## 2020-11-07 PROCEDURE — 99232 SBSQ HOSP IP/OBS MODERATE 35: CPT | Performed by: FAMILY MEDICINE

## 2020-11-07 PROCEDURE — 25010000002 CALCIUM GLUCONATE PER 10 ML

## 2020-11-07 RX ADMIN — HYDROCODONE BITARTRATE AND ACETAMINOPHEN 1 TABLET: 7.5; 325 TABLET ORAL at 10:40

## 2020-11-07 RX ADMIN — CETIRIZINE HYDROCHLORIDE 5 MG: 10 TABLET, FILM COATED ORAL at 09:01

## 2020-11-07 RX ADMIN — FAMOTIDINE 20 MG: 20 TABLET, FILM COATED ORAL at 08:59

## 2020-11-07 RX ADMIN — DIAZEPAM 5 MG: 5 TABLET ORAL at 20:42

## 2020-11-07 RX ADMIN — Medication 1 CAPSULE: at 09:02

## 2020-11-07 RX ADMIN — SODIUM CHLORIDE, PRESERVATIVE FREE 10 ML: 5 INJECTION INTRAVENOUS at 09:02

## 2020-11-07 RX ADMIN — ACETAMINOPHEN 650 MG: 325 TABLET, FILM COATED ORAL at 20:42

## 2020-11-07 RX ADMIN — HYDROCODONE BITARTRATE AND ACETAMINOPHEN 1 TABLET: 7.5; 325 TABLET ORAL at 20:42

## 2020-11-07 RX ADMIN — ACETAMINOPHEN 650 MG: 325 TABLET, FILM COATED ORAL at 05:34

## 2020-11-07 RX ADMIN — TAZOBACTAM SODIUM AND PIPERACILLIN SODIUM 3.38 G: 375; 3 INJECTION, SOLUTION INTRAVENOUS at 20:44

## 2020-11-07 RX ADMIN — TAZOBACTAM SODIUM AND PIPERACILLIN SODIUM 3.38 G: 375; 3 INJECTION, SOLUTION INTRAVENOUS at 05:34

## 2020-11-07 RX ADMIN — FAMOTIDINE 20 MG: 20 TABLET, FILM COATED ORAL at 20:42

## 2020-11-07 RX ADMIN — POTASSIUM PHOSPHATE, MONOBASIC POTASSIUM PHOSPHATE, DIBASIC: 224; 236 INJECTION, SOLUTION, CONCENTRATE INTRAVENOUS at 17:46

## 2020-11-07 RX ADMIN — SODIUM CHLORIDE, PRESERVATIVE FREE 10 ML: 5 INJECTION INTRAVENOUS at 09:01

## 2020-11-07 RX ADMIN — TAZOBACTAM SODIUM AND PIPERACILLIN SODIUM 3.38 G: 375; 3 INJECTION, SOLUTION INTRAVENOUS at 14:45

## 2020-11-07 RX ADMIN — LOSARTAN POTASSIUM 50 MG: 25 TABLET, FILM COATED ORAL at 08:59

## 2020-11-07 RX ADMIN — HYDROCODONE BITARTRATE AND ACETAMINOPHEN 1 TABLET: 7.5; 325 TABLET ORAL at 14:45

## 2020-11-07 RX ADMIN — SMOFLIPID 150 ML: 6; 6; 5; 3 INJECTION, EMULSION INTRAVENOUS at 17:46

## 2020-11-07 RX ADMIN — ONDANSETRON HYDROCHLORIDE 4 MG: 4 TABLET, FILM COATED ORAL at 20:42

## 2020-11-07 RX ADMIN — HEPARIN SODIUM 5000 UNITS: 5000 INJECTION, SOLUTION INTRAVENOUS; SUBCUTANEOUS at 20:43

## 2020-11-07 RX ADMIN — HEPARIN SODIUM 5000 UNITS: 5000 INJECTION, SOLUTION INTRAVENOUS; SUBCUTANEOUS at 05:34

## 2020-11-07 RX ADMIN — HEPARIN SODIUM 5000 UNITS: 5000 INJECTION, SOLUTION INTRAVENOUS; SUBCUTANEOUS at 14:46

## 2020-11-07 RX ADMIN — ACETAMINOPHEN 650 MG: 325 TABLET, FILM COATED ORAL at 14:46

## 2020-11-07 RX ADMIN — CITALOPRAM HYDROBROMIDE 40 MG: 40 TABLET ORAL at 09:01

## 2020-11-07 NOTE — PROGRESS NOTES
[facility] Medicine Services  PROGRESS NOTE    Patient Name: Daja Hubbard  : 1960  MRN: [id number]    Date of Admission: 11/3/2020  Primary Care Physician: Sp Marie MD    Subjective   Subjective     CC:  F/U diverticulitis    HPI:  Patient is resting in bed. Her  is bedside. She says she had some output from her ostomy. She says her pain is better but nausea is worse.     Review of Systems  Gen- No fevers, chills  CV- No chest pain, palpitations  Resp- No cough, dyspnea  GI- + N/V NO /D, +abd pain        Objective   Objective     Vital Signs:   Temp:  [98 °F (36.7 °C)-98.3 °F (36.8 °C)] 98 °F (36.7 °C)  Heart Rate:  [58-63] 63  Resp:  [18] 18  BP: (124-168)/(78-85) 124/78        Physical Exam:  Constitutional: No acute distress, awake, alert. Slightly diaphoretic   HENT: NCAT, mucous membranes moist, NG in place   Respiratory: Clear to auscultation bilaterally, respiratory effort normal   Cardiovascular: RRR, no murmurs, rubs, or gallops,  Gastrointestinal: positive bowel sounds, soft, ostomy in place (just changed)   Musculoskeletal: No bilateral ankle edema  Psychiatric: Appropriate affect, cooperative  Neurologic: Oriented x 3, strength symmetric in all extremities, Cranial Nerves grossly intact to confrontation, speech clear  Skin: No rashes      Results Reviewed:  Results from last 7 days   Lab Units 20   WBC 10*3/mm3 7.86 9.05 9.84   HEMOGLOBIN g/dL 10.0* 10.9* 9.8*   HEMATOCRIT % 31.8* 33.2* 29.8*   PLATELETS 10*3/mm3 213 231 216     Results from last 7 days   Lab Units 20   SODIUM mmol/L 135* 135* 128*   POTASSIUM mmol/L 4.2 4.6 4.3   CHLORIDE mmol/L 107 105 100   CO2 mmol/L 20.0* 21.0* 21.0*   BUN mg/dL 9 10 17   CREATININE mg/dL 0.83 0.99 1.50*   GLUCOSE mg/dL 85 91 100*   CALCIUM mg/dL 8.1* 8.5* 9.0     Estimated Creatinine Clearance: 75.5 mL/min (by C-G formula based  on SCr of 0.83 mg/dL).    Microbiology Results Abnormal     Procedure Component Value - Date/Time    Anaerobic Culture - Wound, Rectal Abscess [[id number]] Collected: 11/03/20 1000    Lab Status: Preliminary result Specimen: Wound from Rectal Abscess Updated: 11/06/20 1056     Anaerobic Culture Screening for Anaerobes    Wound Culture - Wound, Rectal Abscess [[id number]]  (Abnormal)  (Susceptibility) Collected: 11/03/20 1000    Lab Status: Final result Specimen: Wound from Rectal Abscess Updated: 11/06/20 0932     Wound Culture Rare Pseudomonas aeruginosa      Scant growth (1+) Normal Urogenital Aruna     Gram Stain Many (4+) Red blood cells      Rare (1+) WBCs seen      Few (2+) Gram negative bacilli    Susceptibility      Pseudomonas aeruginosa     JONO     Cefepime Susceptible     Ceftazidime Susceptible     Ciprofloxacin Susceptible     Gentamicin Susceptible     Levofloxacin Susceptible     Piperacillin + Tazobactam Susceptible                          Imaging Results (Last 24 Hours)     ** No results found for the last 24 hours. **               I have reviewed the medications:  Scheduled Meds:acetaminophen, 650 mg, Oral, Q8H  cetirizine, 5 mg, Oral, Daily  citalopram, 40 mg, Oral, Daily  famotidine, 20 mg, Oral, BID  heparin (porcine), 5,000 Units, Subcutaneous, Q8H  lactobacillus acidophilus, 1 capsule, Oral, Daily  losartan, 50 mg, Oral, Daily  piperacillin-tazobactam, 3.375 g, Intravenous, Q8H  sodium chloride, 10 mL, Intravenous, Q12H  sodium chloride, 10 mL, Intravenous, Q12H      Continuous Infusions:Pharmacy to Dose TPN,   sodium chloride, 125 mL/hr, Last Rate: 125 mL/hr (11/06/20 1832)      PRN Meds:.diazePAM  •  HYDROcodone-acetaminophen  •  HYDROmorphone **AND** naloxone  •  magnesium sulfate **OR** magnesium sulfate **OR** magnesium sulfate  •  ondansetron **OR** ondansetron  •  Pharmacy to Dose TPN  •  potassium chloride **OR** potassium chloride **OR** potassium chloride  •  sodium chloride  •   sodium chloride  •  sodium chloride    Assessment/Plan   Assessment & Plan     Active Hospital Problems    Diagnosis  POA   • **Diverticulitis [K57.92]  Yes   • Colonic diverticular abscess [K57.20]  Yes   • Essential hypertension [I10]  Yes      Resolved Hospital Problems   No resolved problems to display.        Brief Hospital Course to date:  Daja Hubbard is a 60 y.o. female with PMH of HTN admitted for lap colon resection and drianage of sigmoid abscess due to diverticulitis.     Diveritcular Abscess  S/p lap colon resection, abscess drainage  -Dr Arias following, wound culture rare pseudomonas  -Zosyn through 11/17/2020  -PICC in place   -Dr jackson following   -dr Chang performed cystoscopy and placed tse     AARTI-resolved  Hyponatremia-improving/resolved  Hyperkalemia-resolved   -cr 0.83  -Na is 135  -NS is @ 125 but she is no longer taking in po   -repeat BMP     HTN  - losartan      Depression  -celexa    DVT Prophylaxis:  Heparin SC       Disposition: I expect the patient to be discharged TBD.     CODE STATUS:   Code Status and Medical Interventions:   Ordered at: 11/03/20 1059     Level Of Support Discussed With:    Patient     Code Status:    CPR     Medical Interventions (Level of Support Prior to Arrest):    Full       Nurys Lo, DO  11/07/20

## 2020-11-07 NOTE — THERAPY TREATMENT NOTE
Patient Name: Daja Hubbard  : 1960    MRN: [id number]                              Today's Date: 2020       Admit Date: 11/3/2020    Visit Dx:     ICD-10-CM ICD-9-CM   1. Diverticulitis  K57.92 562.11     Patient Active Problem List   Diagnosis   • Sigmoid diverticulitis   • Pancreatic insufficiency   • Essential hypertension   • Depression   • Hypokalemia   • AARTI (acute kidney injury) (CMS/HCC)   • Atrophic vaginitis   • Mild dysplasia of cervix (DEV I)   • Urinary incontinence   • Diverticulitis   • Colonic diverticular abscess     Past Medical History:   Diagnosis Date   • Anemia    • Cervical high risk human papillomavirus (HPV) DNA test positive 2015    NON 16/18 POSITIVE   • Cervical stricture or stenosis     cervical stricture and stenosis   • Depression    • Diverticulitis    • Fibromyalgia    • History of kidney stones    • Hypertension    • Low grade squamous intraepithelial lesion (LGSIL) on cervical Pap smear 2020    colposcopy 2020 with BX at 6 and ECC; unsatisfactory; thick WE extending inside OS   • Mild dysplasia of cervix     (DEV I)   • Mild dysplasia of cervix 2020    ECC positive LGSIL; colposcopy unsatisfactory with WE extending inside os.   • Mild dysplasia of cervix (DEV I) 2020   • Recurrent UTI    • Tobacco dependence    • Urge incontinence     has a bladder stimulator     Past Surgical History:   Procedure Laterality Date   • BLADDER SURGERY     •  SECTION     • COLON RESECTION N/A 11/3/2020    Procedure: LAPROSCOPIC LOWER ANTERIOR RESECTION, LOOP ILEOSTOMY CREATION, MOBOLIZATION OF SPLENIC FLEXURE, APPENDECTOMY;  Surgeon: Kojo Barney MD;  Location:  [facility];  Service: General;  Laterality: N/A;   • COLONOSCOPY     • COLPOSCOPY  2015   • CYSTOSCOPY W/ URETERAL STENT PLACEMENT  11/3/2020    Procedure: CYSTOSCOPY URETERAL CATHETER INSERTION;  Surgeon: Kory Chang Jr., MD;  Location:  [facility];  Service: Urology;;   •  HERNIA REPAIR     • INCISION AND DRAINAGE HEMATOMA Right     inner thigh   • KIDNEY STONE SURGERY     • LEEP  08/11/2020     General Information     Row Name 11/07/20 1445          Physical Therapy Time and Intention    Document Type  therapy note (daily note)  -AS     Mode of Treatment  physical therapy  -AS     Row Name 11/07/20 1445          General Information    Patient Profile Reviewed  yes  -AS     Existing Precautions/Restrictions  fall Ileostomy, NG tube  -AS     Barriers to Rehab  none identified  -AS     Row Name 11/07/20 1445          Cognition    Orientation Status (Cognition)  oriented x 4  -AS     Row Name 11/07/20 1445          Safety Issues, Functional Mobility    Impairments Affecting Function (Mobility)  balance;endurance/activity tolerance;pain  -AS     Comment, Safety Issues/Impairments (Mobility)  alert and following commands, c/o discomfort from NG tube  -AS       User Key  (r) = Recorded By, (t) = Taken By, (c) = Cosigned By    Initials Name Provider Type    AS Rhonda Kaminski PTA Physical Therapy Assistant        Mobility     Row Name 11/07/20 1446          Bed Mobility    Supine-Sit [place] (Bed Mobility)  supervision  -AS     Sit-Supine [place] (Bed Mobility)  supervision  -AS     Assistive Device (Bed Mobility)  bed rails;head of bed elevated  -AS     Comment (Bed Mobility)  patient demonstrated safe technique, incerased time and effort for sit>supine but did not need assistance  -AS     Row Name 11/07/20 1446          Transfers    Comment (Transfers)  cues for hand placement  -AS     Row Name 11/07/20 1446          Bed-Chair Transfer    Bed-Chair [place] (Transfers)  not tested  -AS     Row Name 11/07/20 1446          Sit-Stand Transfer    Sit-Stand [place] (Transfers)  verbal cues;contact guard  -AS     Assistive Device (Sit-Stand Transfers)  other (see comments) No AD  -AS     Row Name 11/07/20 1446          Gait/Stairs (Locomotion)    [place] Level (Gait)   verbal cues;contact guard;1 person to manage equipment  -AS     Assistive Device (Gait)  other (see comments) no AD  -AS     Distance in Feet (Gait)  120'  -AS     Deviations/Abnormal Patterns (Gait)  manjeet decreased  -AS     Comment (Gait/Stairs)  Patient ambulated 120 feet with CGA, no AD needed. Ambulates at slow steady pace, no LOB noticed this date. Distance limited by weakness and fatigue.  -AS       User Key  (r) = Recorded By, (t) = Taken By, (c) = Cosigned By    Initials Name Provider Type    AS Rhonda Kaminski PTA Physical Therapy Assistant        Obj/Interventions     Row Name 11/07/20 1449          Motor Skills    Therapeutic Exercise  knee  -AS     Row Name 11/07/20 1449          Knee (Therapeutic Exercise)    Knee (Therapeutic Exercise)  strengthening exercise  -AS     Knee Strengthening (Therapeutic Exercise)  marching while seated;LAQ (long arc quad);sitting;10 repetitions  -AS       User Key  (r) = Recorded By, (t) = Taken By, (c) = Cosigned By    Initials Name Provider Type    AS Rhonda Kaminski PTA Physical Therapy Assistant        Goals/Plan    No documentation.       Clinical Impression     Row Name 11/07/20 1449          Pain    Additional Documentation  Pain Scale: Numbers Pre/Post-Treatment (Group)  -AS     [facility] Name 11/07/20 1449          Pain Scale: Numbers Pre/Post-Treatment    Pretreatment Pain Rating  4/10  -AS     Posttreatment Pain Rating  4/10  -AS     Pain Location - Orientation  incisional  -AS     Pain Location  abdomen  -AS     Pre/Posttreatment Pain Comment  tolerated treatment  -AS     Pain Intervention(s)  Ambulation/increased activity;Repositioned  -AS     Row Name 11/07/20 1449          Plan of Care Review    Plan of Care Reviewed With  patient  -AS     Progress  improving  -AS     Outcome Summary  Patient ambulated 120 feet with CGA, no AD needed. Ambulates at slow steady pace, no LOB noticed this date. Distance limited by weakness and fatigue.  -AS     Row Name  11/07/20 1449          Positioning and Restraints    Pre-Treatment Position  in bed  -AS     Post Treatment Position  bed  -AS     In Bed  supine;call light within reach;encouraged to call for assist;RUE elevated;side rails up x3  -AS       User Key  (r) = Recorded By, (t) = Taken By, (c) = Cosigned By    Initials Name Provider Type    AS Rhonda Kaminski PTA Physical Therapy Assistant        Outcome Measures     Row Name 11/07/20 1450          How much help from another person do you currently need...    Turning from your back to your side while in flat bed without using bedrails?  4  -AS     Moving from lying on back to sitting on the side of a flat bed without bedrails?  4  -AS     Moving to and from a bed to a chair (including a wheelchair)?  3  -AS     Standing up from a chair using your arms (e.g., wheelchair, bedside chair)?  3  -AS     Climbing 3-5 steps with a railing?  3  -AS     To walk in hospital room?  3  -AS     AM-PAC 6 Clicks Score (PT)  20  -AS     Row Name 11/07/20 1450          Functional Assessment    Outcome Measure Options  AM-PAC 6 Clicks Basic Mobility (PT)  -AS       User Key  (r) = Recorded By, (t) = Taken By, (c) = Cosigned By    Initials Name Provider Type    AS Rhonda Kaminski PTA Physical Therapy Assistant        Physical Therapy Education                 Title: PT OT SLP Therapies (In Progress)     Topic: Physical Therapy (In Progress)     Point: Mobility training (In Progress)     Learning Progress Summary           Patient Acceptance, E, NR by AS at 11/7/2020 1450    Acceptance, E, VU by LM at 11/5/2020 1102                   Point: Precautions (In Progress)     Learning Progress Summary           Patient Acceptance, E, NR by AS at 11/7/2020 1450    Acceptance, E, VU by LM at 11/5/2020 1102                               User Key     Initials Effective Dates Name Provider Type Discipline    AS 06/22/15 -  Rhonda Kaminski PTA Physical Therapy Assistant PT    LM 07/24/19  -  Angela Payton, PT Physical Therapist PT              PT Recommendation and Plan     Plan of Care Reviewed With: patient  Progress: improving  Outcome Summary: Patient ambulated 120 feet with CGA, no AD needed. Ambulates at slow steady pace, no LOB noticed this date. Distance limited by weakness and fatigue.     Time Calculation:   PT Charges     Row Name 11/07/20 1451             Time Calculation    Start Time  1406  -AS      PT Received On  11/07/20  -AS      PT Goal Re-Cert Due Date  11/15/20  -AS         Timed Charges    41430 - PT Therapeutic Exercise Minutes  10  -AS      13363 - Gait Training Minutes   13  -AS        User Key  (r) = Recorded By, (t) = Taken By, (c) = Cosigned By    Initials Name Provider Type    AS Rhonda Kaminski PTA Physical Therapy Assistant        Therapy Charges for Today     Code Description Service Date Service Provider Modifiers Qty    58131293478 HC GAIT TRAINING EA 15 MIN 11/7/2020 Rhonda Kaminski, JANET GP 1    01499448709 HC PT THER PROC EA 15 MIN 11/7/2020 Rhonda Kaminski PTA GP 1    22078135039 HC PT THER SUPP EA 15 MIN 11/7/2020 Rhonda Kaminski PTA GP 2          PT G-Codes  Outcome Measure Options: AM-PAC 6 Clicks Basic Mobility (PT)  AM-PAC 6 Clicks Score (PT): 20  AM-PAC 6 Clicks Score (OT): 22    Rhonda Kaminski PTA  11/7/2020

## 2020-11-07 NOTE — PROGRESS NOTES
Pharmacy Parenteral Nutrition Evaluation    Daja Hubbard is a  60 y.o. female receiving TPN.     Indication:recurrent diverticulitis  Consulting Physician: Dr. Barney    Total Fluid Rate (if stated): start at 20 mL and advance by 25 mL Q8Hrs as tolerated to goal rate of 65 mL/hr.    Labs  Results from last 7 days   Lab Units 11/07/20  0420 11/06/20 0449 11/05/20 0438   SODIUM mmol/L 135* 135* 128*   POTASSIUM mmol/L 4.2 4.6 4.3   CHLORIDE mmol/L 107 105 100   CO2 mmol/L 20.0* 21.0* 21.0*   BUN mg/dL 9 10 17   CREATININE mg/dL 0.83 0.99 1.50*   CALCIUM mg/dL 8.1* 8.5* 9.0   GLUCOSE mg/dL 85 91 100*       Results from last 7 days   Lab Units 11/07/20  0420   PHOSPHORUS mg/dL 3.0       Results from last 7 days   Lab Units 11/07/20  0420 11/06/20 0449 11/05/20 0438   WBC 10*3/mm3 7.86 9.05 9.84   HEMOGLOBIN g/dL 10.0* 10.9* 9.8*   HEMATOCRIT % 31.8* 33.2* 29.8*   PLATELETS 10*3/mm3 213 231 216       No results found for: TRIG    estimated creatinine clearance is 75.5 mL/min (by C-G formula based on SCr of 0.83 mg/dL).    Intake & Output (last 3 days)         11/04 0701 - 11/05 0700 11/05 0701 - 11/06 0700 11/06 0701 - 11/07 0700 11/07 0701 - 11/08 0700    I.V. (mL/kg)        IV Piggyback  50      Total Intake(mL/kg)  50 (0.6)      Urine (mL/kg/hr) 525 (0.3) 1800 (0.9) 900 (0.5)     Emesis/NG output  1100      Drains 295 850 325     Stool 300  125     Blood        Total Output 1120 3750 1350     Net -1120 -3700 -1350             Urine Unmeasured Occurrence  1 x      Emesis Unmeasured Occurrence  1 x              Dietitian Recommendations  Dietary Orders (From admission, onward)       Start     Ordered    11/06/20 2200  Dietary Nutrition Supplements Boost Breeze (Clear Liquid)  3 Times Daily     Question:  Select Supplement:  Answer:  Boost Breeze (Clear Liquid)    11/06/20 1754 11/06/20 1755  Diet Clear Liquid  Diet Effective Now     Question:  Diet Texture / Consistency  Answer:  Clear Liquid    11/06/20 1754     11/03/20 1600  Snack: Chewing gum x30 minutes three times daily to increase saliva flow and provide gas pain relief. Do not give to ileostomy patients.  3 Times Daily     Comments: Chewing gum x30 minutes three times daily to increase saliva flow and provide gas pain relief.      Do not give to ileostomy patients.    11/03/20 1139                        Current TPN Regimen Recommendation:  Dextrose 9% / Amino Acid 6% to start at 20ml/hr for the first 8 hrs, increase by 25ml/hr Q8Hrs as tolerated to goal rate of 65mL/hr.  20% SMOF Lipids 150mL every 24 hours.    Assessment/Plan:  1. Pharmacy to dose TPN ordered for diverticulitis.TPN started on 11/7. Patient has a clear liquid diet ordered but has minial PO intake.  2. Initial macronutrient recommendations per RD. 9%D, 6% AA to start at 20ml/hr for the first 8 hrs, increase to 45ml/hr for the next 8Hrs, then increase to goal rate of 65mL/hr.   3. 150 mL of SMOF lipids ordered daily. Did not add low dose SSI at this time due to low BS, will continue to monitor.   4. Patient's electrolytes are being replaced exogenously and are all WNL. Patient's renal function appears stable. Will initiate TPN with standard electrolytes and continue to monitor.  5. Pharmacy will continue to follow and adjust TPN as appropriate.    Guera Deng, PharmD  Pharmacy Resident  11/7/2020  11:35 EST

## 2020-11-07 NOTE — PROGRESS NOTES
Daja Hubbard     LOS: 4 days     Subjective   Postop day 4 status post low anterior resection with loop ileostomy for diverticular disease.  Remains afebrile.  Blood pressure 157/92 with a pulse of 85.  Potassium 4.5.  CO2 down to 17.  Creatinine 0.88.  Had NG but is taking clear liquids.      Objective     Vital Signs  Vitals:    11/07/20 0859   BP: 157/92   Pulse:    Resp:    Temp:    SpO2:        I/O  Intake & Output (last day)       11/06 0701 - 11/07 0700 11/07 0701 - 11/08 0700    IV Piggyback      Total Intake(mL/kg)      Urine (mL/kg/hr) 900 (0.5) 600 (1.2)    Emesis/NG output      Drains 325     Stool 125 75    Total Output 1350 675    Net -1350 -675                Physical Exam:    Abdomen soft and nontender.  Wound healing well.  Stoma pink and healthy.  Small amount of thin watery bile colored stool passing.         Results Review:       WBC WBC   Date Value Ref Range Status   11/07/2020 7.86 3.40 - 10.80 10*3/mm3 Final   11/06/2020 9.05 3.40 - 10.80 10*3/mm3 Final   11/05/2020 9.84 3.40 - 10.80 10*3/mm3 Final      HGB Hemoglobin   Date Value Ref Range Status   11/07/2020 10.0 (L) 12.0 - 15.9 g/dL Final   11/06/2020 10.9 (L) 12.0 - 15.9 g/dL Final   11/05/2020 9.8 (L) 12.0 - 15.9 g/dL Final      HCT Hematocrit   Date Value Ref Range Status   11/07/2020 31.8 (L) 34.0 - 46.6 % Final   11/06/2020 33.2 (L) 34.0 - 46.6 % Final   11/05/2020 29.8 (L) 34.0 - 46.6 % Final      PLT        Results from last 7 days   Lab Units 11/07/20  0420   PLATELETS 10*3/mm3 213            Sodium Sodium   Date Value Ref Range Status   11/07/2020 135 (L) 136 - 145 mmol/L Final   11/07/2020 136 136 - 145 mmol/L Final   11/06/2020 135 (L) 136 - 145 mmol/L Final   11/05/2020 128 (L) 136 - 145 mmol/L Final   11/04/2020 128 (L) 136 - 145 mmol/L Final      Potassium Potassium   Date Value Ref Range Status   11/07/2020 4.2 3.5 - 5.2 mmol/L Final   11/07/2020 4.5 3.5 - 5.2 mmol/L Final   11/06/2020 4.6 3.5 - 5.2 mmol/L Final    11/05/2020 4.3 3.5 - 5.2 mmol/L Final   11/04/2020 5.0 3.5 - 5.2 mmol/L Final     Comment:     Slight hemolysis detected by analyzer. Results may be affected.      Chloride Chloride   Date Value Ref Range Status   11/07/2020 107 98 - 107 mmol/L Final   11/07/2020 106 98 - 107 mmol/L Final   11/06/2020 105 98 - 107 mmol/L Final   11/05/2020 100 98 - 107 mmol/L Final   11/04/2020 100 98 - 107 mmol/L Final      Bicarbonate No results found for: PLASMABICARB   BUN BUN   Date Value Ref Range Status   11/07/2020 9 6 - 20 mg/dL Final   11/07/2020 9 6 - 20 mg/dL Final   11/06/2020 10 6 - 20 mg/dL Final   11/05/2020 17 6 - 20 mg/dL Final   11/04/2020 17 6 - 20 mg/dL Final      Creatinine Creatinine   Date Value Ref Range Status   11/07/2020 0.83 0.57 - 1.00 mg/dL Final   11/07/2020 0.88 0.57 - 1.00 mg/dL Final   11/06/2020 0.99 0.57 - 1.00 mg/dL Final   11/05/2020 1.50 (H) 0.57 - 1.00 mg/dL Final   11/04/2020 1.55 (H) 0.57 - 1.00 mg/dL Final      Calcium Calcium   Date Value Ref Range Status   11/07/2020 8.1 (L) 8.6 - 10.5 mg/dL Final   11/07/2020 8.3 (L) 8.6 - 10.5 mg/dL Final   11/06/2020 8.5 (L) 8.6 - 10.5 mg/dL Final   11/05/2020 9.0 8.6 - 10.5 mg/dL Final   11/04/2020 9.3 8.6 - 10.5 mg/dL Final      Magnesium Magnesium   Date Value Ref Range Status   11/07/2020 1.7 1.6 - 2.6 mg/dL Final            Assessment/Plan       Diverticulitis    Essential hypertension    Colonic diverticular abscess  Patient postop day 4 status post low anterior resection with loop ileostomy for diverticulitis.  Ileus appears to slowly be resolving.  Will try clamping NG tube and if residual is low will DC.  Repeat laboratories in the morning.        Cresencio Morris MD  11/07/20  13:14 EST

## 2020-11-07 NOTE — PLAN OF CARE
Goal Outcome Evaluation:  Plan of Care Reviewed With: patient  Progress: improving  Outcome Summary: VSS. Pt up and walking x2 this shift. NG has been clamped for majority of shift with no complaints of nausea. room air. NSR. Will continue to monitor.

## 2020-11-07 NOTE — NURSING NOTE
Inpatient Ostomy Therapy Note    Date of Surgery: 11/3/20      Days Post Procedure:  4 Days Post-Op    Surgeon:  Kojo Barney MD    Ostomy:  Loop Ileostomy-     Appliance Type:  Ruth, 1 Piece and Flat    Stoma Description: Viable, Red and Moist    Stoma Size:  32 x 44    Peristomal Skin:  Intact    Last Appliance Change:  11/7/20    Accessories:  Stomahesive Paste    Education:  Diet, Showering, Emptying, Changing Appliance, Ordering Supplies, Outpatient Followup, Treating Diarrhea and Peristomal Skin Issues    Dressing Change:  No    Supplies Provided:  No    Education Folder Provided:  Yes    Plan of Care:  WO Visit    Teaching provided to:  Patient and Spouse    Comments:  WOC nurse f/u for ileostomy. Stoma red and moist; protruding above skin level. 75mL watery dark brown/green stool emptied from pouch per pt. Stomal bridge removed. Appliance changed using [place] #[id number]. Abdomen distended today. NGT in place. Pt may eventually need convexity. Pt and  participated in appliance change.    Summary:  Extensive stomal education performed with pt and , as above. Ambulation encouraged with pt. Pt became sleepy during visit. WO nurse will f/u. Please contact WOC nurse as needed for concerns.     Jocelyne Taylor, [place] - 11/07/20, 12:32 PM EST

## 2020-11-07 NOTE — PROGRESS NOTES
Clinical Nutrition     Nutrition Support Assessment  Reason for Visit:   PN assessment, NPO/Clear Liquid >3 days    Patient Name: Daja Hubbard  YOB: 1960  MRN: [id number]  Date of Encounter: 11/07/20 08:48 EST  Admission date: 11/3/2020    Comments: PN assessment per surgery note 11/6. PICC placed 11/6.  Patient reports minimal PO intake since 10/31. NPO/clear liquids >3 days this admission. Recs provided to PharmD:    Initiation of PN to start at half dextrose concentration as patient is at risk for refeeding syndrome - D9% AA6% to start at 20 mL and advance by 25 mL Q8Hrs as tolerated to goal rate of 65 mL/hr. TGV = 1560 mL/day. 150 mL SMOF lipids daily.      Recommendations:  • Correct/replace depleted electrolyte concentrations prior to the initiation of enteral feedings  • Continue monitoring parameters (serum potassium, phosphorus and magnesium) following EN initiation and replace per protocol  • Slow advancement of EN   • Continue to provide supplemental thiamin (IV or PO) with EN initiation - 100 mg thiamine/day, 1 mg folic acid daily    Patient reports consuming minimal to no oral intake today d/t nausea. RD to continue to monitor oral intake, initiation of PN/olerance, and adjust PN macronutrient recommendations as medically appropriate    Nutrition Assessment   Assessment     Admission diagnosis  Recurrent diverticulitis, diverticular abscess    Additional applicable diagnosis/conditions/procedures this adm:  Recent [facility] admission 10/6 - 10/12 diverticulitis, abdominal pain    Abdominal pain, cramping, diarrhea  (11/3) s/p LAR, drainage of sigmoid abscess, appy, loop ileostomy, cystoscopy ureteral cath/stent insertion  Postop AARTI, resolved  Hyperkalemia    (11/5) NGT placed, plan for clamping trials 11/6    PMH/PSxH:   PMH: She  has a past medical history of Anemia, Cervical high risk human papillomavirus (HPV) DNA test positive (09/23/2015), Cervical stricture or  "stenosis, Depression, Diverticulitis, Fibromyalgia, History of kidney stones, Hypertension, Low grade squamous intraepithelial lesion (LGSIL) on cervical Pap smear (2020), Mild dysplasia of cervix, Mild dysplasia of cervix (2020), Mild dysplasia of cervix (DEV I) (2020), Recurrent UTI, Tobacco dependence, and Urge incontinence.   PSxH: She  has a past surgical history that includes Hernia repair;  section; Colposcopy (2015); Kidney stone surgery; Bladder surgery; Colonoscopy; LEEP (2020); Incision and Drainage Hematoma (Right); Colectomy (N/A, 11/3/2020); and Cystoscopy w/ ureteral stent placement (11/3/2020).       Reported/Observed/Food/Nutrition Related History:      Patient to be seen for NPO/clear liquid >3 days. Per surgery note ,     \"NG clamping trials  Clear liquid diet with protein supplements  TPN, PICC already in place for antibiotic therapy\"    RD spoke with PharmD who confirms consult to begin PN. Patient reports nausea today. States she was only able to tolerate some bites of jello and juice but it \"came back up.\" She reports the last time she was able to tolerate solid food that she kept down was Saturday 10/31. She reports a decreased appetite since previous admission at the beginning of October however, denies weight loss. Reports her UBW is ~180 lbs. Ordered updated standing scale weight as current weight in Epic is stated. Discussed MD request for initiation of PN via PICC while NGT in place and awaiting resolution of GI symptoms. Explained rationale for PN and encouraged oral intake/supplemental intake as able. Paitent and family in room acknowledge understanding of information discussed. Patient asking about NGT removal. Reports she has noticed some ileostomy output.    Anthropometrics     Height: 162.6 cm (64\")  Last filed wt: Weight: 81.6 kg (180 lb) (20 0704)  Weight Method: Stated    BMI: BMI (Calculated): 30.9  Obese Class I: " 30-34.9kg/m2    Ideal Body Weight (IBW) (kg): 55  Admission wt: 180 lb  Method obtained: stated weight per charting 11/3    Weight Change   UBW: ~180 lbs per patient  Weight change:   % wt change:   Time frame of weight loss:     Weight Weight (kg) Weight (lbs) Weight Method VISIT REPORT   12/19/2019 81.647 kg 180 lb Stated    12/19/2019 81.557 kg 179 lb 12.8 oz Standing scale    1/16/2020 77.111 kg 170 lb     9/11/2020 84.959 kg 187 lb 4.8 oz     10/6/2020 77.111 kg 170 lb Stated    10/6/2020 83.19 kg 183 lb 6.4 oz     10/30/2020 81.92 kg 180 lb 9.6 oz Standing scale    11/3/2020 81.647 kg 180 lb Stated        Labs reviewed     Results from last 7 days   Lab Units 11/07/20  0420 11/06/20  0449 11/05/20  0438   GLUCOSE mg/dL 85 91 100*   BUN mg/dL 9 10 17   CREATININE mg/dL 0.83 0.99 1.50*   SODIUM mmol/L 135* 135* 128*   CHLORIDE mmol/L 107 105 100   POTASSIUM mmol/L 4.2 4.6 4.3           Invalid input(s): PLAT        Lab Results   Lab Value Date/Time    HGBA1C 5.10 10/30/2020 1143       Medications reviewed   Pertinent: pepcid, risaquad, zosyn IV      Intake/Ouptut 24 hrs (7:00AM - 6:59 AM)     Intake & Output (last day)       11/06 0701 - 11/07 0700 11/07 0701 - 11/08 0700    IV Piggyback      Total Intake(mL/kg)      Urine (mL/kg/hr) 900 (0.5)     Emesis/NG output      Drains 325     Stool 125     Total Output 1350     Net -1350                 Nutrition Focused Physical Exam Findings      Unable to perform exam due to: Potential for patient discomfort/reported nausea    Needs Assessment (11/7)     Height used  162.6 cm (64 in)   Weight used  81.6 kg (180 lbs) - stated weight on admission         Estimated need Method/Equation used Result    Energy/Calorie need  ~1600 kcals/d  20 - 22 kcal/kg actBW  1632 - 1795 kcal             Protein   ~95 g/day  1.0 - 1.2 g/kg actBW  82 - 98                 Current Nutrition Prescription     PO: Diet Clear Liquid  Snack: Chewing gum x30 minutes three times daily to increase  saliva flow and provide gas pain relief. Do not give to ileostomy patients.  Dietary Nutrition Supplements: Boost Breeze (Clear Liquid)    Intake/Evaluation of Received Nutrient/Fluid Intake last day 5441-6516:     At Target Goal Volume    % Est needs Received per I/O's    % Est needs   Volume  1560 ml                 Energy/kcals 1151 kcals 72% kcals %   Protein  94g pro 99% g pro %                     Nutrition Diagnosis     11/7  Problem Inadequate oral intake   Etiology GI status   Signs/Symptoms NPO/clear liquid > 3 days     11/7  Problem Altered GI function   Etiology Alteration in GI tract structure   Signs/Symptoms Complicated diverticulitis s/p LAR, loop ileostomy requiring NGT       Nutrition Intervention     1.  Follow treatment progress, Care plan reviewed  2. NPO/Clear liquid >3 days. RD notes surgery request to initiate PN. PICC in place. RD recommends initiation of PN to start at half dextrose concentration as patient is at risk for refeeding syndrome. Discussed with PharmD - D9% AA6% to start at 20 mL and advance by 25 mL Q8Hrs as tolerated to goal rate of 65 mL/hr. TGV = 1560 mL/day. 150 mL SMOF lipids daily.    Route: PICC     At Target Goal Volume     % Est needs   Volume  1560 ml     Energy/kcals 1151 kcals 72%   Protein 94 g pro 99%            3. Patient reports minimal PO intake since 10/31. PN calculated to advance slowly as patient at risk for refeeding syndrome.    Recommendations:  • Correct/replace depleted electrolyte concentrations prior to the initiation of enteral feedings  • Continue monitoring parameters (serum potassium, phosphorus and magnesium) following EN initiation and replace per protocol  • Slow advancement of EN   • Continue to provide supplemental thiamin (IV or PO) with EN initiation    4. Ordered Phosphorus lab to check prior to initiation of PN  5. Ordered standing scale weight to assess most current weight status  6. Weekly Nutrition panel, CRP and Prealbumin ordered to  start Monday 11/9. Nutrition panel, CRP and Prealbumin ordered to be completed 11/8.   7. RD to continue to monitor initiation of PN and tolerance, adjusting PN macronutrient recommendations as medically appropriate    Goal:   General: Nutrition support treatment  PO: Tolerate PO, Advace diet as medically feasible/appropriate  EN/PN: Initiate PN    Monitoring/Evaluation:   Per protocol, I&O, PO intake, Supplement intake, Pertinent labs, PN delivery/tolerance, Weight, GI status, Symptoms    Will Continue to follow per protocol    Lora Cedillo RDN, LD  Time Spent: 90 minutes

## 2020-11-07 NOTE — PLAN OF CARE
Problem: Adult Inpatient Plan of Care  Goal: Plan of Care Review  Recent Flowsheet Documentation  Taken 11/7/2020 1449 by Rhonda Kaminski PTA  Progress: improving  Plan of Care Reviewed With: patient  Outcome Summary: Patient ambulated 120 feet with CGA, no AD needed. Ambulates at slow steady pace, no LOB noticed this date. Distance limited by weakness and fatigue.

## 2020-11-08 LAB
ALBUMIN SERPL-MCNC: 2.8 G/DL (ref 3.5–5.2)
ALBUMIN/GLOB SERPL: 1.4 G/DL
ALP SERPL-CCNC: 56 U/L (ref 39–117)
ALT SERPL W P-5'-P-CCNC: 6 U/L (ref 1–33)
ANION GAP SERPL CALCULATED.3IONS-SCNC: 9 MMOL/L (ref 5–15)
AST SERPL-CCNC: 11 U/L (ref 1–32)
BACTERIA SPEC ANAEROBE CULT: NORMAL
BASOPHILS # BLD AUTO: 0.01 10*3/MM3 (ref 0–0.2)
BASOPHILS NFR BLD AUTO: 0.1 % (ref 0–1.5)
BILIRUB SERPL-MCNC: 0.2 MG/DL (ref 0–1.2)
BUN SERPL-MCNC: 8 MG/DL (ref 6–20)
BUN/CREAT SERPL: 10.3 (ref 7–25)
CA-I SERPL ISE-MCNC: 1.22 MMOL/L (ref 1.12–1.32)
CALCIUM SPEC-SCNC: 8.1 MG/DL (ref 8.6–10.5)
CHLORIDE SERPL-SCNC: 102 MMOL/L (ref 98–107)
CO2 SERPL-SCNC: 21 MMOL/L (ref 22–29)
CREAT SERPL-MCNC: 0.78 MG/DL (ref 0.57–1)
DEPRECATED RDW RBC AUTO: 47.4 FL (ref 37–54)
EOSINOPHIL # BLD AUTO: 0.27 10*3/MM3 (ref 0–0.4)
EOSINOPHIL NFR BLD AUTO: 3.8 % (ref 0.3–6.2)
ERYTHROCYTE [DISTWIDTH] IN BLOOD BY AUTOMATED COUNT: 13.9 % (ref 12.3–15.4)
GFR SERPL CREATININE-BSD FRML MDRD: 76 ML/MIN/1.73
GLOBULIN UR ELPH-MCNC: 2 GM/DL
GLUCOSE BLDC GLUCOMTR-MCNC: 119 MG/DL (ref 70–130)
GLUCOSE BLDC GLUCOMTR-MCNC: 120 MG/DL (ref 70–130)
GLUCOSE BLDC GLUCOMTR-MCNC: 124 MG/DL (ref 70–130)
GLUCOSE BLDC GLUCOMTR-MCNC: 139 MG/DL (ref 70–130)
GLUCOSE SERPL-MCNC: 122 MG/DL (ref 65–99)
HCT VFR BLD AUTO: 30.8 % (ref 34–46.6)
HGB BLD-MCNC: 9.8 G/DL (ref 12–15.9)
IMM GRANULOCYTES # BLD AUTO: 0.01 10*3/MM3 (ref 0–0.05)
IMM GRANULOCYTES NFR BLD AUTO: 0.1 % (ref 0–0.5)
LYMPHOCYTES # BLD AUTO: 1.64 10*3/MM3 (ref 0.7–3.1)
LYMPHOCYTES NFR BLD AUTO: 23.2 % (ref 19.6–45.3)
MAGNESIUM SERPL-MCNC: 2.5 MG/DL (ref 1.6–2.6)
MCH RBC QN AUTO: 29.6 PG (ref 26.6–33)
MCHC RBC AUTO-ENTMCNC: 31.8 G/DL (ref 31.5–35.7)
MCV RBC AUTO: 93.1 FL (ref 79–97)
MONOCYTES # BLD AUTO: 0.68 10*3/MM3 (ref 0.1–0.9)
MONOCYTES NFR BLD AUTO: 9.6 % (ref 5–12)
NEUTROPHILS NFR BLD AUTO: 4.45 10*3/MM3 (ref 1.7–7)
NEUTROPHILS NFR BLD AUTO: 63.2 % (ref 42.7–76)
NRBC BLD AUTO-RTO: 0 /100 WBC (ref 0–0.2)
PLATELET # BLD AUTO: 238 10*3/MM3 (ref 140–450)
PMV BLD AUTO: 11.4 FL (ref 6–12)
POTASSIUM SERPL-SCNC: 3.9 MMOL/L (ref 3.5–5.2)
PREALB SERPL-MCNC: 12 MG/DL (ref 20–40)
PROT SERPL-MCNC: 4.8 G/DL (ref 6–8.5)
RBC # BLD AUTO: 3.31 10*6/MM3 (ref 3.77–5.28)
SODIUM SERPL-SCNC: 132 MMOL/L (ref 136–145)
WBC # BLD AUTO: 7.06 10*3/MM3 (ref 3.4–10.8)

## 2020-11-08 PROCEDURE — 25010000002 POTASSIUM CHLORIDE PER 2 MEQ OF POTASSIUM

## 2020-11-08 PROCEDURE — 25010000003 MAGNESIUM SULFATE 4 GM/100ML SOLUTION: Performed by: INTERNAL MEDICINE

## 2020-11-08 PROCEDURE — 99232 SBSQ HOSP IP/OBS MODERATE 35: CPT | Performed by: NURSE PRACTITIONER

## 2020-11-08 PROCEDURE — 25010000002 MAGNESIUM SULFATE PER 500 MG OF MAGNESIUM

## 2020-11-08 PROCEDURE — 83735 ASSAY OF MAGNESIUM: CPT

## 2020-11-08 PROCEDURE — 80053 COMPREHEN METABOLIC PANEL: CPT

## 2020-11-08 PROCEDURE — 25010000002 PIPERACILLIN SOD-TAZOBACTAM PER 1 G: Performed by: INTERNAL MEDICINE

## 2020-11-08 PROCEDURE — 84134 ASSAY OF PREALBUMIN: CPT

## 2020-11-08 PROCEDURE — 82962 GLUCOSE BLOOD TEST: CPT

## 2020-11-08 PROCEDURE — 85025 COMPLETE CBC W/AUTO DIFF WBC: CPT | Performed by: COLON & RECTAL SURGERY

## 2020-11-08 PROCEDURE — 82330 ASSAY OF CALCIUM: CPT

## 2020-11-08 PROCEDURE — 25010000002 ONDANSETRON PER 1 MG: Performed by: INTERNAL MEDICINE

## 2020-11-08 PROCEDURE — 25010000002 CALCIUM GLUCONATE PER 10 ML

## 2020-11-08 PROCEDURE — 25010000002 HEPARIN (PORCINE) PER 1000 UNITS: Performed by: COLON & RECTAL SURGERY

## 2020-11-08 RX ADMIN — ACETAMINOPHEN 650 MG: 325 TABLET, FILM COATED ORAL at 05:17

## 2020-11-08 RX ADMIN — CETIRIZINE HYDROCHLORIDE 5 MG: 10 TABLET, FILM COATED ORAL at 08:07

## 2020-11-08 RX ADMIN — POTASSIUM PHOSPHATE, MONOBASIC POTASSIUM PHOSPHATE, DIBASIC: 224; 236 INJECTION, SOLUTION, CONCENTRATE INTRAVENOUS at 18:29

## 2020-11-08 RX ADMIN — ONDANSETRON HYDROCHLORIDE 4 MG: 2 SOLUTION INTRAMUSCULAR; INTRAVENOUS at 23:30

## 2020-11-08 RX ADMIN — HEPARIN SODIUM 5000 UNITS: 5000 INJECTION, SOLUTION INTRAVENOUS; SUBCUTANEOUS at 05:18

## 2020-11-08 RX ADMIN — HEPARIN SODIUM 5000 UNITS: 5000 INJECTION, SOLUTION INTRAVENOUS; SUBCUTANEOUS at 20:34

## 2020-11-08 RX ADMIN — HYDROCODONE BITARTRATE AND ACETAMINOPHEN 1 TABLET: 7.5; 325 TABLET ORAL at 11:16

## 2020-11-08 RX ADMIN — FAMOTIDINE 20 MG: 20 TABLET, FILM COATED ORAL at 08:08

## 2020-11-08 RX ADMIN — HYDROCODONE BITARTRATE AND ACETAMINOPHEN 1 TABLET: 7.5; 325 TABLET ORAL at 17:25

## 2020-11-08 RX ADMIN — SODIUM CHLORIDE 125 ML/HR: 9 INJECTION, SOLUTION INTRAVENOUS at 10:59

## 2020-11-08 RX ADMIN — Medication 1 CAPSULE: at 08:08

## 2020-11-08 RX ADMIN — ACETAMINOPHEN 650 MG: 325 TABLET, FILM COATED ORAL at 14:02

## 2020-11-08 RX ADMIN — CITALOPRAM HYDROBROMIDE 40 MG: 40 TABLET ORAL at 08:08

## 2020-11-08 RX ADMIN — DIAZEPAM 5 MG: 5 TABLET ORAL at 14:02

## 2020-11-08 RX ADMIN — ONDANSETRON HYDROCHLORIDE 4 MG: 2 SOLUTION INTRAMUSCULAR; INTRAVENOUS at 08:04

## 2020-11-08 RX ADMIN — HEPARIN SODIUM 5000 UNITS: 5000 INJECTION, SOLUTION INTRAVENOUS; SUBCUTANEOUS at 14:02

## 2020-11-08 RX ADMIN — ACETAMINOPHEN 650 MG: 325 TABLET, FILM COATED ORAL at 20:33

## 2020-11-08 RX ADMIN — HYDROCODONE BITARTRATE AND ACETAMINOPHEN 1 TABLET: 7.5; 325 TABLET ORAL at 04:17

## 2020-11-08 RX ADMIN — SODIUM CHLORIDE, PRESERVATIVE FREE 10 ML: 5 INJECTION INTRAVENOUS at 08:08

## 2020-11-08 RX ADMIN — FAMOTIDINE 20 MG: 20 TABLET, FILM COATED ORAL at 20:34

## 2020-11-08 RX ADMIN — TAZOBACTAM SODIUM AND PIPERACILLIN SODIUM 3.38 G: 375; 3 INJECTION, SOLUTION INTRAVENOUS at 05:17

## 2020-11-08 RX ADMIN — MAGNESIUM SULFATE IN WATER 4 G: 40 INJECTION, SOLUTION INTRAVENOUS at 01:18

## 2020-11-08 RX ADMIN — SODIUM CHLORIDE, PRESERVATIVE FREE 10 ML: 5 INJECTION INTRAVENOUS at 20:30

## 2020-11-08 RX ADMIN — SMOFLIPID 150 ML: 6; 6; 5; 3 INJECTION, EMULSION INTRAVENOUS at 18:28

## 2020-11-08 RX ADMIN — LOSARTAN POTASSIUM 50 MG: 25 TABLET, FILM COATED ORAL at 08:07

## 2020-11-08 NOTE — PROGRESS NOTES
Pharmacy Parenteral Nutrition Evaluation    Daja Hubbard is a  60 y.o. female receiving TPN.     Indication:recurrent diverticulitis  Consulting Physician: Dr. Barney    Total Fluid Rate (if stated):  goal rate of 45 mL/hr.    Labs  Results from last 7 days   Lab Units 11/08/20  0637 11/07/20 0420 11/06/20  0449   SODIUM mmol/L 132* 136  135* 135*   POTASSIUM mmol/L 3.9 4.5  4.2 4.6   CHLORIDE mmol/L 102 106  107 105   CO2 mmol/L 21.0* 17.0*  20.0* 21.0*   BUN mg/dL 8 9  9 10   CREATININE mg/dL 0.78 0.88  0.83 0.99   CALCIUM mg/dL 8.1* 8.3*  8.1* 8.5*   BILIRUBIN mg/dL 0.2 0.2  --    ALK PHOS U/L 56 55  --    ALT (SGPT) U/L 6 5  --    AST (SGOT) U/L 11 12  --    GLUCOSE mg/dL 122* 85  85 91       Results from last 7 days   Lab Units 11/08/20 0637 11/07/20  0420   MAGNESIUM mg/dL 2.5 1.7   PHOSPHORUS mg/dL  --  3.0       Results from last 7 days   Lab Units 11/08/20 0637 11/07/20 0420 11/06/20 0449   WBC 10*3/mm3 7.06 7.86 9.05   HEMOGLOBIN g/dL 9.8* 10.0* 10.9*   HEMATOCRIT % 30.8* 31.8* 33.2*   PLATELETS 10*3/mm3 238 213 231       Triglycerides   Date Value Ref Range Status   11/07/2020 109 0 - 150 mg/dL Final     Comment:     Falsely depressed results may occur on samples drawn from patients receiving N-Acetylcysteine (NAC) or Metamizole.       estimated creatinine clearance is 83.6 mL/min (by C-G formula based on SCr of 0.78 mg/dL).    Intake & Output (last 3 days)         11/05 0701 - 11/06 0700 11/06 0701 - 11/07 0700 11/07 0701 - 11/08 0700 11/08 0701 - 11/09 0700    P.O.   100     I.V. (mL/kg)    2630 (29.7)    IV Piggyback 50       Total Intake(mL/kg) 50 (0.6)  100 (1.1) 2630 (29.7)    Urine (mL/kg/hr) 1800 (0.9) 900 (0.5) 1150 (0.5)     Emesis/NG output 1100       Drains 850 325      Stool  125 75     Total Output 3750 1350 1225     Net -3700 -1350 -1125 +2630            Urine Unmeasured Occurrence 1 x       Emesis Unmeasured Occurrence 1 x               Dietitian Recommendations  Dietary  Orders (From admission, onward)       Start     Ordered    11/08/20 1126  Diet Full Liquid  Diet Effective Now     Question:  Diet Texture / Consistency  Answer:  Full Liquid    11/08/20 1125    11/06/20 2200  Dietary Nutrition Supplements Boost Breeze (Clear Liquid)  3 Times Daily     Question:  Select Supplement:  Answer:  Boost Breeze (Clear Liquid)    11/06/20 1754    11/03/20 1600  Snack: Chewing gum x30 minutes three times daily to increase saliva flow and provide gas pain relief. Do not give to ileostomy patients.  3 Times Daily     Comments: Chewing gum x30 minutes three times daily to increase saliva flow and provide gas pain relief.      Do not give to ileostomy patients.    11/03/20 1139                        Current TPN Regimen Recommendation:  Dextrose 9% / Amino Acid 6% Currently at goal rate of 45mL/hr.  20% SMOF Lipids 150mL every 24 hours.    Assessment/Plan:  1. Pharmacy to dose TPN ordered for diverticulitis.TPN started on 11/7. Patient has a clear liquid diet ordered but has minial PO intake.  2. Initial macronutrient recommendations per RD. TPN currently running at goal rate of 45 mL/hr with 9% Dextrose and 6% AA. 150 mL of SMOF lipids ordered daily. Did not add low dose SSI at this time due to recent low BS, will continue to monitor.   3. Spoke with dietary, progressing diet and adding Boost supplements. Will follow-up with dietary tomorrow regarding option of advancing TPN or if aappropriate to begin weaning.   4. Patient's electrolytes are being replaced exogenously and are all WNL, except Na is slightly decreased. Patient's renal function appears stable. Will continue standard electrolytes and continue to make adjustments as appropriate  5. Pharmacy will continue to follow and adjust TPN as appropriate.    Guera Deng, PharmD  Pharmacy Resident  11/8/2020  13:58 EST

## 2020-11-08 NOTE — PROGRESS NOTES
Slow progress    Walking in the hallways encouraged  Decreased pain  Stoma appears to have output  Does not like sweets in the diet.    Low residue diet  Frequent ambulation encouraged  Lab work appears satisfactory  Plan to wean TPN off once evidence of tolerating diet.

## 2020-11-08 NOTE — PROGRESS NOTES
[facility] Medicine Services  PROGRESS NOTE    Patient Name: Daja Hubbard  : 1960  MRN: [id number]    Date of Admission: 11/3/2020  Primary Care Physician: Sp Marie MD    Subjective   Subjective     CC:  F/U diverticulitis s/p colon resection     HPI:  Patient resting in bed.  Reports some nausea and bloating this am, denies vomiting.  Reports pain is overall better.  Reporting some liquid output from her ostomy.    Review of Systems  Gen- Denies fevers, chills  CV- Denies chest pain, palpitations  Resp- Denies cough, dyspnea  GI- positive for nausea and bloating, negative for vomiting and abd pain      Objective   Objective     Vital Signs:   Temp:  [98.1 °F (36.7 °C)-98.7 °F (37.1 °C)] 98.1 °F (36.7 °C)  Heart Rate:  [58-93] 69  Resp:  [16-18] 16  BP: (123-153)/(73-87) 140/87        Physical Exam:  Constitutional: No acute distress, awake, alert, lying in bed, ill appearing.  HENT: NCAT, mucous membranes moist  Respiratory: Clear to auscultation bilaterally, respiratory effort normal   Cardiovascular: RRR, no murmurs, rubs, or gallops, palpable pedal pulses bilaterally  Gastrointestinal: Positive bowel sounds, some mild distention noted, appropriately tender in area surrounding ostomy.  Dark green liquid noted in ostomy bag.  Musculoskeletal: No bilateral ankle edema  Psychiatric: Appropriate affect, cooperative  Neurologic: Oriented x 3, speech clear  Skin: No rashes noted to exposed areas of skin, warm and dry      Results Reviewed:  Results from last 7 days   Lab Units 209   WBC 10*3/mm3 7.06 7.86 9.05   HEMOGLOBIN g/dL 9.8* 10.0* 10.9*   HEMATOCRIT % 30.8* 31.8* 33.2*   PLATELETS 10*3/mm3 238 213 231     Results from last 7 days   Lab Units 200 20  0449   SODIUM mmol/L 132* 136  135* 135*   POTASSIUM mmol/L 3.9 4.5  4.2 4.6   CHLORIDE mmol/L 102 106  107 105   CO2 mmol/L 21.0* 17.0*   20.0* 21.0*   BUN mg/dL 8 9  9 10   CREATININE mg/dL 0.78 0.88  0.83 0.99   GLUCOSE mg/dL 122* 85  85 91   CALCIUM mg/dL 8.1* 8.3*  8.1* 8.5*   ALT (SGPT) U/L 6 5  --    AST (SGOT) U/L 11 12  --      Estimated Creatinine Clearance: 83.6 mL/min (by C-G formula based on SCr of 0.78 mg/dL).    Microbiology Results Abnormal     Procedure Component Value - Date/Time    Anaerobic Culture - Wound, Rectal Abscess [[id number]] Collected: 11/03/20 1000    Lab Status: Final result Specimen: Wound from Rectal Abscess Updated: 11/08/20 0719     Anaerobic Culture No anaerobes isolated at 5 days    Wound Culture - Wound, Rectal Abscess [[id number]]  (Abnormal)  (Susceptibility) Collected: 11/03/20 1000    Lab Status: Final result Specimen: Wound from Rectal Abscess Updated: 11/06/20 0932     Wound Culture Rare Pseudomonas aeruginosa      Scant growth (1+) Normal Urogenital Aruna     Gram Stain Many (4+) Red blood cells      Rare (1+) WBCs seen      Few (2+) Gram negative bacilli    Susceptibility      Pseudomonas aeruginosa     JONO     Cefepime Susceptible     Ceftazidime Susceptible     Ciprofloxacin Susceptible     Gentamicin Susceptible     Levofloxacin Susceptible     Piperacillin + Tazobactam Susceptible                          Imaging Results (Last 24 Hours)     ** No results found for the last 24 hours. **               I have reviewed the medications:  Scheduled Meds:acetaminophen, 650 mg, Oral, Q8H  cetirizine, 5 mg, Oral, Daily  citalopram, 40 mg, Oral, Daily  famotidine, 20 mg, Oral, BID  Fat Emul Fish Oil/Plant Based, 150 mL, Intravenous, Q24H (TPN)  heparin (porcine), 5,000 Units, Subcutaneous, Q8H  lactobacillus acidophilus, 1 capsule, Oral, Daily  losartan, 50 mg, Oral, Daily  sodium chloride, 10 mL, Intravenous, Q12H  sodium chloride, 10 mL, Intravenous, Q12H      Continuous Infusions:Adult Cyclic 2-in-1 TPN, , Last Rate: 45 mL/hr at 11/08/20 1134  Pharmacy to Dose TPN,       PRN Meds:.diazePAM  •   HYDROcodone-acetaminophen  •  HYDROmorphone **AND** naloxone  •  magnesium sulfate **OR** magnesium sulfate **OR** magnesium sulfate  •  ondansetron **OR** ondansetron  •  Pharmacy to Dose TPN  •  potassium chloride **OR** potassium chloride **OR** potassium chloride  •  sodium chloride  •  sodium chloride  •  sodium chloride    Assessment/Plan   Assessment & Plan     Active Hospital Problems    Diagnosis  POA   • **Diverticulitis [K57.92]  Yes   • Colonic diverticular abscess [K57.20]  Yes   • Essential hypertension [I10]  Yes      Resolved Hospital Problems   No resolved problems to display.        Brief Hospital Course to date:  Daja Hubbard is a 60 y.o. female with medical history significant for HTN.  She was admitted for scheduled lab colon resection and drainage of sigmoid abscess due to diverticulitis.      Diveritcular Abscess  S/p lap colon resection, abscess drainage  -wound culture rare pseudomonas  -Dr Arias with ID following   -Plan for Zosyn through 11/17/2020  -PICC line in place   -Dr. Barney following     S/P cystoscopy, [facility]  -Performed per Dr. Chang       AARTI-resolved  Hyponatremia-improving/resolved  Hyperkalemia-resolved   -cr 0.78 today  -Na is 132 today, down from 135 yesterday  -Advanced to full liquid diet today per Dr. Morris  -repeat BMP in am     HTN  - continue losartan       Depression  - continue celexa       DVT Prophylaxis:  Subcutaneous Hep      Disposition: I expect the patient to be discharged TBD.     CODE STATUS:   Code Status and Medical Interventions:   Ordered at: 11/03/20 1059     Level Of Support Discussed With:    Patient     Code Status:    CPR     Medical Interventions (Level of Support Prior to Arrest):    Full       Anisha Wynn, APRN  11/08/20

## 2020-11-08 NOTE — NURSING NOTE
WOC nurse f/u for ileostomy.    Stoma red and moist. Appliance intact with no leakage. Small amount watery dark brown output in pouch.     Pt not feeling well; c/o pain which RN is aware of. Not interested in stomal education at this time.     NGT has been D/C.     WO nurse will f/u. Please contact [facility] nurse as needed for concerns.

## 2020-11-08 NOTE — PROGRESS NOTES
Dajaabisai Hubbard     LOS: 5 days     Subjective   Taking small amount of liquids.  NG has been removed.  Good urine output.  White blood cell count 7.0.  Hemoglobin 9.8.  CO2 up to 21.  Passing thin bilious liquid stool.      Objective     Vital Signs  Vitals:    11/08/20 0900   BP:    Pulse: 69   Resp:    Temp:    SpO2:        I/O  Intake & Output (last day)       11/07 0701 - 11/08 0700 11/08 0701 - 11/09 0700    P.O. 100     I.V. (mL/kg)  2563 (29)    Total Intake(mL/kg) 100 (1.1) 2563 (29)    Urine (mL/kg/hr) 1150 (0.5)     Drains      Stool 75     Total Output 1225     Net -1125 +2563                Physical Exam:    Abdomen soft and nontender.  Wound healing well.  Stoma pink and healthy.         Results Review:       WBC WBC   Date Value Ref Range Status   11/08/2020 7.06 3.40 - 10.80 10*3/mm3 Final   11/07/2020 7.86 3.40 - 10.80 10*3/mm3 Final   11/06/2020 9.05 3.40 - 10.80 10*3/mm3 Final      HGB Hemoglobin   Date Value Ref Range Status   11/08/2020 9.8 (L) 12.0 - 15.9 g/dL Final   11/07/2020 10.0 (L) 12.0 - 15.9 g/dL Final   11/06/2020 10.9 (L) 12.0 - 15.9 g/dL Final      HCT Hematocrit   Date Value Ref Range Status   11/08/2020 30.8 (L) 34.0 - 46.6 % Final   11/07/2020 31.8 (L) 34.0 - 46.6 % Final   11/06/2020 33.2 (L) 34.0 - 46.6 % Final      PLT        Results from last 7 days   Lab Units 11/08/20  0637   PLATELETS 10*3/mm3 238            Sodium Sodium   Date Value Ref Range Status   11/08/2020 132 (L) 136 - 145 mmol/L Final   11/07/2020 135 (L) 136 - 145 mmol/L Final   11/07/2020 136 136 - 145 mmol/L Final   11/06/2020 135 (L) 136 - 145 mmol/L Final      Potassium Potassium   Date Value Ref Range Status   11/08/2020 3.9 3.5 - 5.2 mmol/L Final   11/07/2020 4.2 3.5 - 5.2 mmol/L Final   11/07/2020 4.5 3.5 - 5.2 mmol/L Final   11/06/2020 4.6 3.5 - 5.2 mmol/L Final      Chloride Chloride   Date Value Ref Range Status   11/08/2020 102 98 - 107 mmol/L Final   11/07/2020 107 98 - 107 mmol/L Final   11/07/2020  106 98 - 107 mmol/L Final   11/06/2020 105 98 - 107 mmol/L Final      Bicarbonate No results found for: PLASMABICARB   BUN BUN   Date Value Ref Range Status   11/08/2020 8 6 - 20 mg/dL Final   11/07/2020 9 6 - 20 mg/dL Final   11/07/2020 9 6 - 20 mg/dL Final   11/06/2020 10 6 - 20 mg/dL Final      Creatinine Creatinine   Date Value Ref Range Status   11/08/2020 0.78 0.57 - 1.00 mg/dL Final   11/07/2020 0.83 0.57 - 1.00 mg/dL Final   11/07/2020 0.88 0.57 - 1.00 mg/dL Final   11/06/2020 0.99 0.57 - 1.00 mg/dL Final      Calcium Calcium   Date Value Ref Range Status   11/08/2020 8.1 (L) 8.6 - 10.5 mg/dL Final   11/07/2020 8.1 (L) 8.6 - 10.5 mg/dL Final   11/07/2020 8.3 (L) 8.6 - 10.5 mg/dL Final   11/06/2020 8.5 (L) 8.6 - 10.5 mg/dL Final      Magnesium Magnesium   Date Value Ref Range Status   11/08/2020 2.5 1.6 - 2.6 mg/dL Final   11/07/2020 1.7 1.6 - 2.6 mg/dL Final            Assessment/Plan       Diverticulitis    Essential hypertension    Colonic diverticular abscess      Patient making steady progress.  Will advance to full liquid diet.  Decrease TPN.  Hopefully home in the next few days.    Cresencio Morris MD  11/08/20  11:05 EST

## 2020-11-08 NOTE — PLAN OF CARE
Goal Outcome Evaluation:  Plan of Care Reviewed With: patient  Progress: no change  Outcome Summary: VSS. NG pulled this shift with no new complaints of nausea. Good UOP. TPN increased to 45. Resting well. RA. NSR. Will continue to monitor.

## 2020-11-09 LAB
ALBUMIN SERPL-MCNC: 3.1 G/DL (ref 3.5–5.2)
ALBUMIN/GLOB SERPL: 1.5 G/DL
ALP SERPL-CCNC: 59 U/L (ref 39–117)
ALT SERPL W P-5'-P-CCNC: 6 U/L (ref 1–33)
ANION GAP SERPL CALCULATED.3IONS-SCNC: 7 MMOL/L (ref 5–15)
AST SERPL-CCNC: 9 U/L (ref 1–32)
BILIRUB SERPL-MCNC: 0.2 MG/DL (ref 0–1.2)
BUN SERPL-MCNC: 9 MG/DL (ref 6–20)
BUN/CREAT SERPL: 11.3 (ref 7–25)
CA-I SERPL ISE-MCNC: 1.31 MMOL/L (ref 1.12–1.32)
CALCIUM SPEC-SCNC: 8.7 MG/DL (ref 8.6–10.5)
CHLORIDE SERPL-SCNC: 102 MMOL/L (ref 98–107)
CO2 SERPL-SCNC: 27 MMOL/L (ref 22–29)
CREAT SERPL-MCNC: 0.8 MG/DL (ref 0.57–1)
CRP SERPL-MCNC: 1.81 MG/DL (ref 0–0.5)
GFR SERPL CREATININE-BSD FRML MDRD: 73 ML/MIN/1.73
GLOBULIN UR ELPH-MCNC: 2.1 GM/DL
GLUCOSE BLDC GLUCOMTR-MCNC: 125 MG/DL (ref 70–130)
GLUCOSE BLDC GLUCOMTR-MCNC: 128 MG/DL (ref 70–130)
GLUCOSE SERPL-MCNC: 120 MG/DL (ref 65–99)
MAGNESIUM SERPL-MCNC: 1.8 MG/DL (ref 1.6–2.6)
POTASSIUM SERPL-SCNC: 4.7 MMOL/L (ref 3.5–5.2)
PREALB SERPL-MCNC: 13.4 MG/DL (ref 20–40)
PROT SERPL-MCNC: 5.2 G/DL (ref 6–8.5)
SODIUM SERPL-SCNC: 136 MMOL/L (ref 136–145)

## 2020-11-09 PROCEDURE — 84134 ASSAY OF PREALBUMIN: CPT

## 2020-11-09 PROCEDURE — 97116 GAIT TRAINING THERAPY: CPT | Performed by: PHYSICAL THERAPIST

## 2020-11-09 PROCEDURE — 63710000001 ONDANSETRON PER 8 MG: Performed by: INTERNAL MEDICINE

## 2020-11-09 PROCEDURE — 25010000002 HEPARIN (PORCINE) PER 1000 UNITS: Performed by: COLON & RECTAL SURGERY

## 2020-11-09 PROCEDURE — 97530 THERAPEUTIC ACTIVITIES: CPT | Performed by: PHYSICAL THERAPIST

## 2020-11-09 PROCEDURE — 83735 ASSAY OF MAGNESIUM: CPT

## 2020-11-09 PROCEDURE — 86140 C-REACTIVE PROTEIN: CPT

## 2020-11-09 PROCEDURE — 25010000002 HYDROMORPHONE PER 4 MG: Performed by: COLON & RECTAL SURGERY

## 2020-11-09 PROCEDURE — 82330 ASSAY OF CALCIUM: CPT

## 2020-11-09 PROCEDURE — 99232 SBSQ HOSP IP/OBS MODERATE 35: CPT | Performed by: NURSE PRACTITIONER

## 2020-11-09 PROCEDURE — 82962 GLUCOSE BLOOD TEST: CPT

## 2020-11-09 PROCEDURE — 80053 COMPREHEN METABOLIC PANEL: CPT

## 2020-11-09 RX ADMIN — SODIUM CHLORIDE, PRESERVATIVE FREE 10 ML: 5 INJECTION INTRAVENOUS at 08:56

## 2020-11-09 RX ADMIN — ONDANSETRON HYDROCHLORIDE 4 MG: 4 TABLET, FILM COATED ORAL at 09:03

## 2020-11-09 RX ADMIN — HYDROCODONE BITARTRATE AND ACETAMINOPHEN 1 TABLET: 7.5; 325 TABLET ORAL at 21:37

## 2020-11-09 RX ADMIN — ONDANSETRON HYDROCHLORIDE 4 MG: 4 TABLET, FILM COATED ORAL at 18:11

## 2020-11-09 RX ADMIN — CITALOPRAM HYDROBROMIDE 40 MG: 40 TABLET ORAL at 08:56

## 2020-11-09 RX ADMIN — Medication 1 CAPSULE: at 08:56

## 2020-11-09 RX ADMIN — ACETAMINOPHEN 650 MG: 325 TABLET, FILM COATED ORAL at 20:19

## 2020-11-09 RX ADMIN — SODIUM CHLORIDE, PRESERVATIVE FREE 10 ML: 5 INJECTION INTRAVENOUS at 21:00

## 2020-11-09 RX ADMIN — HEPARIN SODIUM 5000 UNITS: 5000 INJECTION, SOLUTION INTRAVENOUS; SUBCUTANEOUS at 05:05

## 2020-11-09 RX ADMIN — HYDROMORPHONE HYDROCHLORIDE 0.5 MG: 1 INJECTION, SOLUTION INTRAMUSCULAR; INTRAVENOUS; SUBCUTANEOUS at 22:02

## 2020-11-09 RX ADMIN — SODIUM CHLORIDE, PRESERVATIVE FREE 10 ML: 5 INJECTION INTRAVENOUS at 08:57

## 2020-11-09 RX ADMIN — HEPARIN SODIUM 5000 UNITS: 5000 INJECTION, SOLUTION INTRAVENOUS; SUBCUTANEOUS at 20:20

## 2020-11-09 RX ADMIN — ACETAMINOPHEN 650 MG: 325 TABLET, FILM COATED ORAL at 05:05

## 2020-11-09 RX ADMIN — LOSARTAN POTASSIUM 50 MG: 25 TABLET, FILM COATED ORAL at 08:56

## 2020-11-09 RX ADMIN — FAMOTIDINE 20 MG: 20 TABLET, FILM COATED ORAL at 08:56

## 2020-11-09 RX ADMIN — HEPARIN SODIUM 5000 UNITS: 5000 INJECTION, SOLUTION INTRAVENOUS; SUBCUTANEOUS at 13:29

## 2020-11-09 RX ADMIN — FAMOTIDINE 20 MG: 20 TABLET, FILM COATED ORAL at 20:20

## 2020-11-09 RX ADMIN — CETIRIZINE HYDROCHLORIDE 5 MG: 10 TABLET, FILM COATED ORAL at 08:56

## 2020-11-09 RX ADMIN — HYDROMORPHONE HYDROCHLORIDE 0.5 MG: 1 INJECTION, SOLUTION INTRAMUSCULAR; INTRAVENOUS; SUBCUTANEOUS at 12:43

## 2020-11-09 NOTE — PLAN OF CARE
Problem: Adult Inpatient Plan of Care  Goal: Plan of Care Review  Recent Flowsheet Documentation  Taken 11/9/2020 1411 by Angela Payton PT  Progress: improving  Plan of Care Reviewed With: patient  Outcome Summary: All PT goals met at this time.  Pt demonstrates independence with bed mobility, transfers, 300 feet of gait, and ambulating up/down 3 stairs.  Pt reports she is ambulating in the hallways multiple times a day independently.  Pt has no concerns re: mobility or safety.  PT will sign off.

## 2020-11-09 NOTE — PROGRESS NOTES
Clinical Nutrition     Nutrition Support Assessment  Reason for Visit:   Follow-up protocol, PN/PO    Patient Name: Daja Hubbard  YOB: 1960  MRN: [id number]  Date of Encounter: 20 11:58 EST  Admission date: 11/3/2020      Current TPN regimen: D9%, AA6% @ 45 ml/hr. 150 ml 20% SMOF lipid daily.    -Discontinued Boost Breeze. Ordered strawberry Boost Plus with each meal.  -Will provide patient with ileostomy nutrition education/written materials prior to discharge.  -RD recommends continuing current TPN regimen today as patient has yet to establish PO intake tolerance as of this morning.    Nutrition Assessment   Assessment     Admission diagnosis  Recurrent diverticulitis, diverticular abscess    Additional applicable diagnosis/conditions/procedures this adm:  Recent [facility] admission 10/6 - 10/12 diverticulitis, abdominal pain    Abdominal pain, cramping, diarrhea  (11/3) s/p LAR, drainage of sigmoid abscess, appy, loop ileostomy, cystoscopy ureteral cath/stent insertion  Postop AARTI, resolved  Hyperkalemia  Post-op ileus    () NGT placed  () PICC placed and TPN initiated  () NGT discontinued; full liquid diet --> low-fiber diet; TPN @ 45 ml/hr per CRS MD    PMH/PSxH:   PMH: She  has a past medical history of Anemia, Cervical high risk human papillomavirus (HPV) DNA test positive (2015), Cervical stricture or stenosis, Depression, Diverticulitis, Fibromyalgia, History of kidney stones, Hypertension, Low grade squamous intraepithelial lesion (LGSIL) on cervical Pap smear (2020), Mild dysplasia of cervix, Mild dysplasia of cervix (2020), Mild dysplasia of cervix (DEV I) (2020), Recurrent UTI, Tobacco dependence, and Urge incontinence.   PSxH: She  has a past surgical history that includes Hernia repair;  section; Colposcopy (2015); Kidney stone surgery; Bladder surgery; Colonoscopy; LEEP (2020); Incision and Drainage Hematoma  "(Right); Colectomy (N/A, 11/3/2020); and Cystoscopy w/ ureteral stent placement (11/3/2020).       Reported/Observed/Food/Nutrition Related History:   Per nursing doc I/Os (within past 24 hours):  Ileostomy output = 250 ml  UOP = 1600 ml    Patient and APRN present during visit. Patient ate bites of solid food last night and had N/V. This morning patient ate bites at breakfast and tolerated. Continues to receive Boost Breeze with meals and dislikes (too sweet). Would prefer strawberry Boost Plus.    Anthropometrics     Height: 162.6 cm (64\")  Last filed wt: Weight: 88.5 kg (195 lb) (11/09/20 0500)  Weight Method: Bed scale    BMI: BMI (Calculated): 33.5  Obese Class I: 30-34.9kg/m2    Ideal Body Weight (IBW) (kg): 55  Admission wt: 180 lb  Method obtained: stated weight per charting 11/3    Weight Change   UBW: ~180 lbs per patient  Weight change: none     Weight Weight (kg) Weight (lbs) Weight Method VISIT REPORT   12/19/2019 81.647 kg 180 lb Stated    12/19/2019 81.557 kg 179 lb 12.8 oz Standing scale    1/16/2020 77.111 kg 170 lb     9/11/2020 84.959 kg 187 lb 4.8 oz     10/6/2020 77.111 kg 170 lb Stated    10/6/2020 83.19 kg 183 lb 6.4 oz     10/30/2020 81.92 kg 180 lb 9.6 oz Standing scale    11/3/2020 81.647 kg 180 lb Stated        Labs reviewed   Labs reviewed: Yes    Medications reviewed   Medications reviewed: Yes  Pertinent: pepcid, probiotic  PRN: zofran    Needs Assessment (11/7)     Height used  162.6 cm (64 in)   Weight used  81.6 kg (180 lbs) - stated weight on admission         Estimated need Method/Equation used Result    Energy/Calorie need  ~1600 kcals/d  20 - 22 kcal/kg actBW  1632 - 1795 kcal             Protein   ~95 g/day  1.0 - 1.2 g/kg actBW  82 - 98                 Current Nutrition Prescription   PO: Low fiber  Dietary Nutrition Supplements: Boost Breeze 3x daily, Boost Plus 2x daily    PN: D9%, AA6% @ 45 ml/hr. 150 ml 20% SMOF lipid daily.  Route: PICC  Verified at bedside: Yes  Nutrition " provided at goal rate: 1.08 L, 890 calories (56% est needs), 65 g protein (68% est needs), GIR = 0.8 mg/kg/min    Intake/Evaluation of Received Nutrient/Fluid Intake:  PO - insufficient data  TPN - delivery past 24 hours = 991 ml (92% goal volume); lipids not documented in I/Os    Nutrition Diagnosis     11/7 (updated 11/9)  Problem Inadequate oral intake   Etiology GI status, nausea, anorexia   Signs/Symptoms Patient reports she ate bites of dinner last night followed by N/V, reports she ate bites at breakfast this morning and held down, no ONS intake at breakfast; TPN supplementing PO intake at this time   Status: ongoing    11/7  Problem Altered GI function   Etiology Alteration in GI tract structure   Signs/Symptoms Complicated diverticulitis s/p LAR, loop ileostomy requiring NGT     11/9  Problem Food and nutrition knowledge deficit   Etiology No prior need for education   Signs/Symptoms New ileostomy       Nutrition Intervention    Follow treatment progress, Care plan reviewed, Interview for preferences, Adjusted supplement, Encourage intake, Nutrition support order placed     -Discontinued Boost Breeze. Ordered strawberry Boost Plus with each meal.  -RD will provide patient with ileostomy nutrition education/written materials prior to discharge.  -RD recommends continuing current TPN regimen today as patient has yet to establish PO intake tolerance as of this morning.    Goal:   General: Nutrition support treatment  PO: Tolerate PO, Increase intake  EN/PN: Maintain PN, PN to PO    Monitoring/Evaluation:   Per protocol, I&O, PO intake, Supplement intake, Pertinent labs, PN delivery/tolerance, Weight, GI status, Symptoms    Fay Orozco RD  Time Spent: 45 minutes

## 2020-11-09 NOTE — THERAPY DISCHARGE NOTE
Patient Name: Daja Hubbard  : 1960    MRN: [id number]                              Today's Date: 2020       Admit Date: 11/3/2020    Visit Dx:     ICD-10-CM ICD-9-CM   1. Diverticulitis  K57.92 562.11     Patient Active Problem List   Diagnosis   • Sigmoid diverticulitis   • Pancreatic insufficiency   • Essential hypertension   • Depression   • Hypokalemia   • AARTI (acute kidney injury) (CMS/HCC)   • Atrophic vaginitis   • Mild dysplasia of cervix (DEV I)   • Urinary incontinence   • Diverticulitis   • Colonic diverticular abscess     Past Medical History:   Diagnosis Date   • Anemia    • Cervical high risk human papillomavirus (HPV) DNA test positive 2015    NON 16/18 POSITIVE   • Cervical stricture or stenosis     cervical stricture and stenosis   • Depression    • Diverticulitis    • Fibromyalgia    • History of kidney stones    • Hypertension    • Low grade squamous intraepithelial lesion (LGSIL) on cervical Pap smear 2020    colposcopy 2020 with BX at 6 and ECC; unsatisfactory; thick WE extending inside OS   • Mild dysplasia of cervix     (DEV I)   • Mild dysplasia of cervix 2020    ECC positive LGSIL; colposcopy unsatisfactory with WE extending inside os.   • Mild dysplasia of cervix (DEV I) 2020   • Recurrent UTI    • Tobacco dependence    • Urge incontinence     has a bladder stimulator     Past Surgical History:   Procedure Laterality Date   • BLADDER SURGERY     •  SECTION     • COLON RESECTION N/A 11/3/2020    Procedure: LAPROSCOPIC LOWER ANTERIOR RESECTION, LOOP ILEOSTOMY CREATION, MOBOLIZATION OF SPLENIC FLEXURE, APPENDECTOMY;  Surgeon: Kojo Barney MD;  Location:  [facility];  Service: General;  Laterality: N/A;   • COLONOSCOPY     • COLPOSCOPY  2015   • CYSTOSCOPY W/ URETERAL STENT PLACEMENT  11/3/2020    Procedure: CYSTOSCOPY URETERAL CATHETER INSERTION;  Surgeon: Kory Chang Jr., MD;  Location:  [facility];  Service: Urology;;   •  HERNIA REPAIR     • INCISION AND DRAINAGE HEMATOMA Right     inner thigh   • KIDNEY STONE SURGERY     • LEEP  08/11/2020     General Information     Row Name 11/09/20 1411          Physical Therapy Time and Intention    Document Type  therapy note (daily note);discharge treatment  -LM     Mode of Treatment  individual therapy;physical therapy  -LM     Row Name 11/09/20 1411          General Information    Patient Profile Reviewed  yes  -LM     Existing Precautions/Restrictions  other (see comments) Ileostomy  -LM     Row Name 11/09/20 1411          Cognition    Orientation Status (Cognition)  oriented x 4  -LM       User Key  (r) = Recorded By, (t) = Taken By, (c) = Cosigned By    Initials Name Provider Type    LM Angela Payton, PT Physical Therapist        Mobility     Row Name 11/09/20 1411          Bed Mobility    Bed Mobility  supine-sit  -LM     Supine-Sit [place] (Bed Mobility)  modified independence  -LM     Assistive Device (Bed Mobility)  bed rails;head of bed elevated  -LM     Row Name 11/09/20 1411          Transfers    Comment (Transfers)  Stood x 2 - first from EOB; second from low toilet.  Pt independent with pericare.  -LM     Row Name 11/09/20 1411          Sit-Stand Transfer    Sit-Stand [place] (Transfers)  independent  -LM     Assistive Device (Sit-Stand Transfers)  -- No AD  -LM     Row Name 11/09/20 1411          Gait/Stairs (Locomotion)    [place] Level (Gait)  independent  -LM     Assistive Device (Gait)  -- No AD  -LM     Distance in Feet (Gait)  300 feet  -LM     [place] Level (Stairs)  modified independence  -LM     Handrail Location (Stairs)  left side (ascending)  -LM     Number of Steps (Stairs)  3  -LM     Ascending Technique (Stairs)  step-over-step  -LM     Descending Technique (Stairs)  step-over-step  -LM     Comment (Gait/Stairs)  No unsteadiness noted with ambulation or stairs.  Pt reports she is ambulating in the halls multiple times a day.  -LM       User Key   (r) = Recorded By, (t) = Taken By, (c) = Cosigned By    Initials Name Provider Type    LM Angela Payton, SABRINA Physical Therapist        Obj/Interventions     Row Name 11/09/20 1411          Balance    Static Sitting Balance  WFL;sitting in chair;sitting, edge of bed  -LM     Static Standing Balance  WFL;unsupported  -LM     Dynamic Standing Balance  WFL;unsupported  -LM       User Key  (r) = Recorded By, (t) = Taken By, (c) = Cosigned By    Initials Name Provider Type    LM Angela Payton, PT Physical Therapist        Goals/Plan     Row Name 11/09/20 1411          Transfer Goal 1 (PT)    Activity/Assistive Device (Transfer Goal 1, PT)  bed-to-chair/chair-to-bed  -LM     [place] Level/Cues Needed (Transfer Goal 1, PT)  independent  -LM     Time Frame (Transfer Goal 1, PT)  long term goal (LTG);1 week  -LM     Progress/Outcome (Transfer Goal 1, PT)  (S) goal met  -LM     Row Name 11/09/20 1411          Gait Training Goal 1 (PT)    Activity/Assistive Device (Gait Training Goal 1, PT)  gait (walking locomotion)  -LM     [place] Level (Gait Training Goal 1, PT)  independent  -LM     Distance (Gait Training Goal 1, PT)  300 feet  -LM     Time Frame (Gait Training Goal 1, PT)  long term goal (LTG);1 week  -LM     Progress/Outcome (Gait Training Goal 1, PT)  (S) goal met  -LM     Row Name 11/09/20 1411          Stairs Goal 1 (PT)    Activity/Assistive Device (Stairs Goal 1, PT)  ascending stairs;descending stairs;using handrail, left;using handrail, right  -LM     [place] Level/Cues Needed (Stairs Goal 1, PT)  modified independence  -LM     Number of Stairs (Stairs Goal 1, PT)  3  -LM     Time Frame (Stairs Goal 1, PT)  long term goal (LTG);1 week  -LM     Progress/Outcome (Stairs Goal 1, PT)  (S) goal met  -LM       User Key  (r) = Recorded By, (t) = Taken By, (c) = Cosigned By    Initials Name Provider Type    LM Angela Payton, PT Physical Therapist        Clinical Impression     Row Name 11/09/20 1411           Pain    Additional Documentation  Pain Scale: Numbers Pre/Post-Treatment (Group)  -LM     Row Name 11/09/20 1411          Pain Scale: Numbers Pre/Post-Treatment    Pretreatment Pain Rating  2/10  -LM     Posttreatment Pain Rating  2/10  -LM     Pain Location  abdomen  -LM     Pain Intervention(s)  Repositioned;Ambulation/increased activity  -LM     Row Name 11/09/20 1411          Plan of Care Review    Plan of Care Reviewed With  patient  -LM     Progress  improving  -LM     Outcome Summary  All PT goals met at this time.  Pt demonstrates independence with bed mobility, transfers, 300 feet of gait, and ambulating up/down 3 stairs.  Pt reports she is ambulating in the hallways multiple times a day independently.  Pt has no concerns re: mobility or safety.  PT will sign off.  -LM     Row Name 11/09/20 1411          Positioning and Restraints    Pre-Treatment Position  in bed  -LM     Post Treatment Position  chair  -LM     In Chair  sitting;call light within reach;encouraged to call for assist;notified nsg  -LM       User Key  (r) = Recorded By, (t) = Taken By, (c) = Cosigned By    Initials Name Provider Type    LM Angela Payton, PT Physical Therapist        Outcome Measures     Row Name 11/09/20 1411          How much help from another person do you currently need...    Turning from your back to your side while in flat bed without using bedrails?  4  -LM     Moving from lying on back to sitting on the side of a flat bed without bedrails?  4  -LM     Moving to and from a bed to a chair (including a wheelchair)?  4  -LM     Standing up from a chair using your arms (e.g., wheelchair, bedside chair)?  4  -LM     Climbing 3-5 steps with a railing?  4  -LM     To walk in hospital room?  4  -LM     AM-PAC 6 Clicks Score (PT)  24  -LM     Row Name 11/09/20 1411          Functional Assessment    Outcome Measure Options  AM-PAC 6 Clicks Basic Mobility (PT)  -LM       User Key  (r) = Recorded By, (t) = Taken By, (c) =  Cosigned By    Initials Name Provider Type     Angela Payton, PT Physical Therapist        Physical Therapy Education                 Title: PT OT SLP Therapies (In Progress)     Topic: Physical Therapy (In Progress)     Point: Mobility training (In Progress)     Learning Progress Summary           Patient Acceptance, E, NR by AS at 11/7/2020 1450    Acceptance, E, VU by LM at 11/5/2020 1102                   Point: Precautions (In Progress)     Learning Progress Summary           Patient Acceptance, E, NR by AS at 11/7/2020 1450    Acceptance, E, VU by LM at 11/5/2020 1102                               User Key     Initials Effective Dates Name Provider Type Discipline    AS 06/22/15 -  Rhonda Kaminski PTA Physical Therapy Assistant PT     07/24/19 -  Angela Payton PT Physical Therapist PT              PT Recommendation and Plan  Planned Therapy Interventions (PT): balance training, bed mobility training, gait training, home exercise program, motor coordination training, neuromuscular re-education, patient/family education, postural re-education, ROM (range of motion), stair training, strengthening, stretching, transfer training  Plan of Care Reviewed With: patient  Progress: improving  Outcome Summary: All PT goals met at this time.  Pt demonstrates independence with bed mobility, transfers, 300 feet of gait, and ambulating up/down 3 stairs.  Pt reports she is ambulating in the hallways multiple times a day independently.  Pt has no concerns re: mobility or safety.  PT will sign off.     Time Calculation:   PT Charges     Row Name 11/09/20 1411             Time Calculation    Start Time  1411  -LM      PT Received On  11/09/20  -LM      PT Goal Re-Cert Due Date  11/15/20  -LM         Timed Charges    29427 - Gait Training Minutes   15  -LM      56319 - PT Therapeutic Activity Minutes  8  -LM        User Key  (r) = Recorded By, (t) = Taken By, (c) = Cosigned By    Initials Name Provider Type    LM  Angela Payton, PT Physical Therapist        Therapy Charges for Today     Code Description Service Date Service Provider Modifiers Qty    [id number] HC GAIT TRAINING EA 15 MIN 11/9/2020 Angela Payton, PT GP 1    [id number] HC PT THERAPEUTIC ACT EA 15 MIN 11/9/2020 Angela Payton, PT GP 1          PT G-Codes  Outcome Measure Options: AM-PAC 6 Clicks Basic Mobility (PT)  AM-PAC 6 Clicks Score (PT): 24  AM-PAC 6 Clicks Score (OT): 22    PT Discharge Summary  Anticipated Discharge Disposition (PT): home with assist    Angela Payton, SABRINA  11/9/2020

## 2020-11-09 NOTE — PROGRESS NOTES
"[facility]    INPATIENT PROGRESS NOTE  2020      PATIENT NAME: Daja Hubbard  :  1960  MRN:  [id number]  Date of Admission:  11/3/2020      Antimicrobials:  Zosyn    MAR reviewed.     Reason for consultation:  Complicated diverticulitis with abscess    Interval history: NG tube out and pt tolerating PO.  Some abd bloating but no n/v.  No fevers.  PICC intact.  TPN ongoing for now.  Feels overall better.    ROS:  No fevers/chills overnight.  No new rashes.  No SOB or chest pain.  Denies side effects from antimicrobials.    Objective:  Temp (24hrs), Av.2 °F (36.8 °C), Min:97.7 °F (36.5 °C), Max:98.5 °F (36.9 °C)    /86 (BP Location: Left arm, Patient Position: Lying)   Pulse 70   Temp 98.5 °F (36.9 °C) (Axillary)   Resp 16   Ht 162.6 cm (64\")   Wt 88.5 kg (195 lb)   SpO2 97%   BMI 33.47 kg/m²     Physical Examination:  GENERAL: Awake and alert, appears a bit uncomfortable.  LINES:  right arm PICC.  HEENT: Normocephalic, atraumatic.  EOMI. No conjunctival injection or subconjunctival hemorrhage.  CV: RRR. No murmur, rubs, gallops.  Normal S1S2.  LUNGS: Clear to auscultation bilaterally without wheezing, rales, rhonchi. Normal respiratory effort.  ABDOMEN: Soft, nontender, mildly distended. Positive bowel sounds.  Left lower quadrant drain is out.  Right-sided ileostomy in place with liquid output.  Midline surgical dressing in place, clean.  EXT:  No clubbing, cyanosis, or edema.    MSK: No joint effusions or inflammation noted.  SKIN: Warm and dry without rash or ulcer.   NEURO: A&Ox4. No focal deficits.  Face symmetric.  Speech fluent.  Moves all extremities well.   PSYCHIATRIC: Normal insight and judgement.  Cooperative.  Normal affect.       Laboratory Data:    Results from last 7 days   Lab Units 20  0637 20  0420 20  0449   WBC 10*3/mm3 7.06 7.86 9.05   HEMOGLOBIN g/dL 9.8* 10.0* 10.9*   HEMATOCRIT % 30.8* 31.8* 33.2*   PLATELETS " 10*3/mm3 238 213 231     Results from last 7 days   Lab Units 11/09/20  0555   SODIUM mmol/L 136   POTASSIUM mmol/L 4.7   CHLORIDE mmol/L 102   CO2 mmol/L 27.0   BUN mg/dL 9   CREATININE mg/dL 0.80   GLUCOSE mg/dL 120*   CALCIUM mg/dL 8.7     Results from last 7 days   Lab Units 11/09/20  0555   ALK PHOS U/L 59   BILIRUBIN mg/dL 0.2   ALT (SGPT) U/L 6   AST (SGOT) U/L 9         Results from last 7 days   Lab Units 11/09/20  0555   CRP mg/dL 1.81*     Estimated Creatinine Clearance: 81.5 mL/min (by C-G formula based on SCr of 0.8 mg/dL).                    Microbiology:  Microbiology Results (last 10 days)     Procedure Component Value - Date/Time    Anaerobic Culture - Wound, Rectal Abscess [[id number]] Collected: 11/03/20 1000    Lab Status: Final result Specimen: Wound from Rectal Abscess Updated: 11/08/20 0719     Anaerobic Culture No anaerobes isolated at 5 days    Wound Culture - Wound, Rectal Abscess [[id number]]  (Abnormal)  (Susceptibility) Collected: 11/03/20 1000    Lab Status: Final result Specimen: Wound from Rectal Abscess Updated: 11/06/20 0932     Wound Culture Rare Pseudomonas aeruginosa      Scant growth (1+) Normal Urogenital Aruna     Gram Stain Many (4+) Red blood cells      Rare (1+) WBCs seen      Few (2+) Gram negative bacilli    Susceptibility      Pseudomonas aeruginosa     JONO     Cefepime Susceptible     Ceftazidime Susceptible     Ciprofloxacin Susceptible     Gentamicin Susceptible     Levofloxacin Susceptible     Piperacillin + Tazobactam Susceptible                    COVID PRE-OP / PRE-PROCEDURE SCREENING ORDER (NO ISOLATION) - Swab, Nasopharynx [[id number]] Collected: 11/01/20 1113    Lab Status: Final result Specimen: Swab from Nasopharynx Updated: 11/01/20 2052    Narrative:      The following orders were created for panel order COVID PRE-OP / PRE-PROCEDURE SCREENING ORDER (NO ISOLATION) - Swab, Nasopharynx.  Procedure                               Abnormality         Status            "          ---------                               -----------         ------                     COVID-19,[facility], NP ...[[id number]]                      Final result                 Please view results for these tests on the individual orders.    COVID-19,[facility], NP SWAB IN [facility]EXAR VIRAL TRANSPORT MEDIA 24-30 HR TAT - Swab, Nasopharynx [[id number]] Collected: 11/01/20 1113    Lab Status: Final result Specimen: Swab from Nasopharynx Updated: 11/01/20 2052     SARS-CoV-2 WILLIAN Not Detected            Radiology:  Imaging Results (Last 72 Hours)     ** No results found for the last 72 hours. **          DISCUSSION:  60 y.o. female with history of recurrent diverticulitis who is admitted for planned colon resection.  Operative findings included ongoing inflammation in the sigmoid colon with contained perforation with abscess.  This was drained and culture was obtained.  S/p LAR with loop ileostomy.  Patient had recently completed a 2-week course of ertapenem followed by oral Augmentin.    PROBLEM LIST:   --Complicated sigmoid diverticulitis with contained perforation and abscess, status post surgical drainage.  Culture with pansensitive Pseudomonas.  --History of recurrent episodes of diverticulitis.  Status post low anterior resection with loop ileostomy 11/3/2020.  --AARTI, new.  Postop.  Now resolved.  --N/V/distention, suspect post-op ileus, s/p NGT.  Improved.     PLAN:  --Continue Zosyn (reordered as had \"fallen off\" of MAR yesterday)  --PICC in place  --Abx through 11/17/20  --Will have CM arrange:  Zosyn 13.5 g IV continuous infusion daily via PICC through 11/17/20, weekly PICC dressing change, and weekly labs - CBC/diff, CMP, ESR, CRP - fax to [facility] office at [phone].    Plan discussed with patient.    Osmar Arias MD  11/9/2020  16:59 EST    "

## 2020-11-09 NOTE — PLAN OF CARE
Goal Outcome Evaluation:  Plan of Care Reviewed With: patient  Progress: improving       Pts VSS.  Pt did have one bout of vomiting tonight after looking at her ileostomh.  She suleiman it made her nauseated.  Zofran was given IV for this.  Pt has not had any other complaints of nausea or vomiting since there episode.  Ileostomy putting out good amounts of liquid brown stool.  TPN remains at 45mls an hour.  No acute distress is noted.  Will monitor..........

## 2020-11-09 NOTE — NURSING NOTE
[facility]OC follow-up:     Appliance (Ruth [id number]) is intact.   Stoma looks good.   Some liquid green output noted.  NG-tube is out.   Did not eat much breakfast.   No reported nausea or emesis.     Encouraged continued ambulation.      Stomal education and appliance change performed on Saturday, 11-7-2020.     Will plan to meet with spouse one more time prior to discharge.   Will provide discharge supplies when appropriate.     Contact [facility] nurse if needs arise.     Thanks

## 2020-11-09 NOTE — PLAN OF CARE
Goal Outcome Evaluation:  Plan of Care Reviewed With: patient  Progress: improving  Outcome Summary: Pt has some complaints of pain and nausea. Pt ambulated in the [place] twice this shift. VSS, room air. No concerns at this time. Will continue to monitor.

## 2020-11-09 NOTE — PROGRESS NOTES
"[facility] ([facility])        Vital Signs:  Blood pressure 156/86, pulse 70, temperature 98.5 °F (36.9 °C), temperature source Axillary, resp. rate 16, height 162.6 cm (64\"), weight 88.5 kg (195 lb), SpO2 97 %.    Labs past 24 hours:  Lab Results (last 24 hours)     Procedure Component Value Units Date/Time    Prealbumin [[id number]]  (Abnormal) Collected: 11/09/20 0555    Specimen: Blood Updated: 11/09/20 1819     Prealbumin 13.4 mg/dL     POC Glucose Once [[id number]]  (Normal) Collected: 11/09/20 1226    Specimen: Blood Updated: 11/09/20 1243     Glucose 128 mg/dL     Magnesium [[id number]]  (Normal) Collected: 11/09/20 0555    Specimen: Blood Updated: 11/09/20 0726     Magnesium 1.8 mg/dL     Comprehensive Metabolic Panel [[id number]]  (Abnormal) Collected: 11/09/20 0555    Specimen: Blood Updated: 11/09/20 0724     Glucose 120 mg/dL      BUN 9 mg/dL      Creatinine 0.80 mg/dL      Sodium 136 mmol/L      Potassium 4.7 mmol/L      Chloride 102 mmol/L      CO2 27.0 mmol/L      Calcium 8.7 mg/dL      Total Protein 5.2 g/dL      Albumin 3.10 g/dL      ALT (SGPT) 6 U/L      AST (SGOT) 9 U/L      Alkaline Phosphatase 59 U/L      Total Bilirubin 0.2 mg/dL      eGFR Non African Amer 73 mL/min/1.73      Globulin 2.1 gm/dL      A/G Ratio 1.5 g/dL      BUN/Creatinine Ratio 11.3     Anion Gap 7.0 mmol/L     Narrative:      GFR Normal >60  Chronic Kidney Disease <60  Kidney Failure <15      Calcium, Ionized [[id number]]  (Normal) Collected: 11/09/20 0555    Specimen: Blood Updated: 11/09/20 0710     Ionized Calcium 1.31 mmol/L     C-reactive Protein [[id number]]  (Abnormal) Collected: 11/09/20 0555    Specimen: Blood Updated: 11/09/20 0655     C-Reactive Protein 1.81 mg/dL     POC Glucose Once [[id number]]  (Normal) Collected: 11/09/20 0625    Specimen: Blood Updated: 11/09/20 0626     Glucose 125 mg/dL     POC Glucose Once [[id number]]  (Normal) Collected: 11/08/20 2355    Specimen: Blood Updated: " 11/08/20 2358     Glucose 120 mg/dL           I/O last shift:  I/O this shift:  In: -   Out: 250 [Urine:200; Stool:50]     PHYSICAL EXAM-  Comfortable  cv- rsr  Chest- clear, =  Abd- soft, mild distention, good stoma function    Pathology:  Order Name Source Comment Collection Info Order Time   ANAEROBIC CULTURE Rectal Abscess  Collected By: Kojo Barney MD 11/3/2020 10:26 AM   WOUND CULTURE Rectal Abscess  Collected By: Kojo Barney MD 11/3/2020 10:26 AM   TYPE AND SCREEN   Collected By: Denise Grullon, RN 11/3/2020  6:20 AM   TISSUE PATHOLOGY EXAM Large Intestine, Sigmoid Colon  Collected By: Kojo Barney MD 11/3/2020 10:18 AM   .    Assessment and Plan:  Not very active but no evidence of any difficulties  DC TPN  Likely to do well at home, plan for transition to home tomorrow.  Discharge instructions reviewed    Kojo Barney MD  11/09/20  18:31 EST

## 2020-11-09 NOTE — PROGRESS NOTES
Pharmacy Parenteral Nutrition Evaluation  Daja Hubbard is a 60 y.o. female receiving TPN.     Indication:recurrent diverticulitis  Consulting Physician: Dr. Barney    Labs  Results from last 7 days   Lab Units 11/09/20 0555 11/08/20 0637 11/07/20  0420   SODIUM mmol/L 136 132* 136  135*   POTASSIUM mmol/L 4.7 3.9 4.5  4.2   CHLORIDE mmol/L 102 102 106  107   CO2 mmol/L 27.0 21.0* 17.0*  20.0*   BUN mg/dL 9 8 9  9   CREATININE mg/dL 0.80 0.78 0.88  0.83   CALCIUM mg/dL 8.7 8.1* 8.3*  8.1*   BILIRUBIN mg/dL 0.2 0.2 0.2   ALK PHOS U/L 59 56 55   ALT (SGPT) U/L 6 6 5   AST (SGOT) U/L 9 11 12   GLUCOSE mg/dL 120* 122* 85  85   Ionized Ca: 1.31 (11/9)    Results from last 7 days   Lab Units 11/09/20 0555 11/08/20 0637 11/07/20  0420   MAGNESIUM mg/dL 1.8 2.5 1.7   PHOSPHORUS mg/dL  --   --  3.0   PREALBUMIN mg/dL  --  12.0*  --      Results from last 7 days   Lab Units 11/08/20 0637 11/07/20 0420 11/06/20  0449   WBC 10*3/mm3 7.06 7.86 9.05   HEMOGLOBIN g/dL 9.8* 10.0* 10.9*   HEMATOCRIT % 30.8* 31.8* 33.2*   PLATELETS 10*3/mm3 238 213 231     Triglycerides   Date Value Ref Range Status   11/07/2020 109 0 - 150 mg/dL Final     Comment:     Falsely depressed results may occur on samples drawn from patients receiving N-Acetylcysteine (NAC) or Metamizole.     estimated creatinine clearance is 81.5 mL/min (by C-G formula based on SCr of 0.8 mg/dL).    Intake & Output (last 3 days)         11/06 0701 - 11/07 0700 11/07 0701 - 11/08 0700 11/08 0701 - 11/09 0700 11/09 0701 - 11/10 0700    P.O.  100      I.V. (mL/kg)   2630 (29.7)     IV Piggyback        TPN   991     Total Intake(mL/kg)  100 (1.1) 3621 (40.9)     Urine (mL/kg/hr) 900 (0.5) 1150 (0.5) 1600 (0.8) 200 (0.4)    Emesis/NG output   250     Drains 325       Stool 125 75 250 50    Total Output 1350 1225 2100 250    Net -1350 -1125 +1521 -250                  Dietitian Recommendations  Dietary Orders (From admission, onward)       Start     Ordered     11/09/20 1140  Dietary Nutrition Supplements Boost Plus; strawberry  Daily With Breakfast, Lunch & Dinner     Comments: Boost Plus (strawberry only) 3x daily   Question Answer Comment   Select Supplement: Boost Plus    Flavor: strawberry        11/09/20 1139    11/09/20 1140  DIET MESSAGE Please send a strawberry Boost Plus with lunch meal tray today. No Boost Breeze. Thank you.  Once     Comments: Please send a strawberry Boost Plus with lunch meal tray today. No Boost Breeze. Thank you.    11/09/20 1139 11/08/20 1802  Diet Regular; Low Fiber / Low Residue  Diet Effective Now     Question Answer Comment   Diet Texture / Consistency Regular    Common Modifiers Low Fiber / Low Residue        11/08/20 1802 11/03/20 1600  Snack: Chewing gum x30 minutes three times daily to increase saliva flow and provide gas pain relief. Do not give to ileostomy patients.  3 Times Daily     Comments: Chewing gum x30 minutes three times daily to increase saliva flow and provide gas pain relief.      Do not give to ileostomy patients.    11/03/20 1139                  Current TPN Regimen Recommendation:  Dextrose 9% / Amino Acid 6% at a goal rate of 45mL/hr.  20% SMOF Lipids 150mL every 24 hours.    Assessment/Plan:  1. Pharmacy to dose TPN for diverticulitis, started 11/7. Boost supplements ordered, but pt still with minimal PO intake.   2. Continue current macronutrient recommendations per RD -- 9% Dextrose and 6% AA at goal rate of 45mL/hr. 150 mL of SMOF lipids ordered daily.   3. BG range 119-135 in the last 24 hours -- no SSI ordered currently. Patient is not a diabetic, with an A1c of 5.1.   4. Electrolyte protocol ordered for exogenous replacement as needed. K and Mag are WNL today. Will repeat phos tomorrow.  5. Pharmacy will continue to follow and adjust TPN as appropriate.    Thank you,  Karyn Chong, PharmD, BCPS  11/9/2020  12:31 EST

## 2020-11-09 NOTE — PROGRESS NOTES
Continued Stay Note  [facility]     Patient Name: Daja Hubbard  MRN: [id number]  Today's Date: 11/9/2020    Admit Date: 11/3/2020    Discharge Plan     Row Name 11/09/20 1401       Plan    Plan  Home with family    Patient/Family in Agreement with Plan  yes    Plan Comments  Spoke with patient at bedside.  Patient continues to have TPN infusing.  States that she does not have much of an appetite.  Planning for discharge home when medically ready.  Will likely need IV antibiotics for discharge.  Patient states that she plans to have antibiotics administered at home.  She has had this service before.  Will continue to follow for discharge planning.    Final Discharge Disposition Code  01 - home or self-care        Discharge Codes    No documentation.       Expected Discharge Date and Time     Expected Discharge Date Expected Discharge Time    Nov 12, 2020             Rebekah Hooker RN

## 2020-11-09 NOTE — PROGRESS NOTES
[facility] Medicine Services  PROGRESS NOTE    Patient Name: Daja Hubbard  : 1960  MRN: [id number]    Date of Admission: 11/3/2020  Primary Care Physician: pS Marie MD    Subjective   Subjective     CC:  F/U diverticulitis s/p colon resection     HPI:  Patient seen resting in bed awake and alert.  In no acute distress.  S/O at bedside.  States she feels okay right now but not much appetite.  Had nausea and vomiting last p.m., none today.  Received solid meal tray last night and this morning for which she feels she is not ready.  Asking for strawberry boost.  Will discuss with dietitian.  Reports she has walked in the [place] several times and overall feels she is improving.    Review of Systems  Gen- Denies fevers, chills  CV- Denies chest pain, palpitations  Resp- Denies cough, dyspnea  GI- positive for nausea and bloating, negative for vomiting and abd pain      Objective   Objective     Vital Signs:   Temp:  [97.7 °F (36.5 °C)-98.5 °F (36.9 °C)] 98.5 °F (36.9 °C)  Heart Rate:  [63-70] 70  Resp:  [16-18] 16  BP: (151-156)/(84-86) 156/86        Physical Exam:  Constitutional: No acute distress, awake, alert, lying in bed,/O at bedside.  HENT: NCAT, mucous membranes moist  Respiratory: Clear to auscultation bilaterally A/P, respiratory effort normal on RA with sats 97%.  Cardiovascular: RRR, no murmurs, rubs, or gallops, palpable pedal pulses bilaterally  Gastrointestinal: Positive bowel sounds, some mild distention noted.  Appropriately tender in area surrounding ostomy and incision.  Small amount of dark green liquid stool noted in RLQostomy bag.  Stoma pink, moist, raised.  Musculoskeletal: No bilateral ankle edema.  Thomas spontaneously.  Psychiatric: Appropriate affect, cooperative and calm.  Neurologic: Oriented x 3, speech clear and appropriate.  Follows commands.  Nonfocal.  Skin: No rashes noted to exposed areas of skin, warm and dry.  Right upper extremity PICC line  in place C/d/i.       Results Reviewed:  Results from last 7 days   Lab Units 11/08/20  0637 11/07/20  0420 11/06/20  0449   WBC 10*3/mm3 7.06 7.86 9.05   HEMOGLOBIN g/dL 9.8* 10.0* 10.9*   HEMATOCRIT % 30.8* 31.8* 33.2*   PLATELETS 10*3/mm3 238 213 231     Results from last 7 days   Lab Units 11/09/20  0555 11/08/20 0637 11/07/20  0420   SODIUM mmol/L 136 132* 136  135*   POTASSIUM mmol/L 4.7 3.9 4.5  4.2   CHLORIDE mmol/L 102 102 106  107   CO2 mmol/L 27.0 21.0* 17.0*  20.0*   BUN mg/dL 9 8 9  9   CREATININE mg/dL 0.80 0.78 0.88  0.83   GLUCOSE mg/dL 120* 122* 85  85   CALCIUM mg/dL 8.7 8.1* 8.3*  8.1*   ALT (SGPT) U/L 6 6 5   AST (SGOT) U/L 9 11 12     Estimated Creatinine Clearance: 81.5 mL/min (by C-G formula based on SCr of 0.8 mg/dL).    Microbiology Results Abnormal     Procedure Component Value - Date/Time    Anaerobic Culture - Wound, Rectal Abscess [[id number]] Collected: 11/03/20 1000    Lab Status: Final result Specimen: Wound from Rectal Abscess Updated: 11/08/20 0719     Anaerobic Culture No anaerobes isolated at 5 days    Wound Culture - Wound, Rectal Abscess [[id number]]  (Abnormal)  (Susceptibility) Collected: 11/03/20 1000    Lab Status: Final result Specimen: Wound from Rectal Abscess Updated: 11/06/20 0932     Wound Culture Rare Pseudomonas aeruginosa      Scant growth (1+) Normal Urogenital Aruna     Gram Stain Many (4+) Red blood cells      Rare (1+) WBCs seen      Few (2+) Gram negative bacilli    Susceptibility      Pseudomonas aeruginosa     JONO     Cefepime Susceptible     Ceftazidime Susceptible     Ciprofloxacin Susceptible     Gentamicin Susceptible     Levofloxacin Susceptible     Piperacillin + Tazobactam Susceptible                          Imaging Results (Last 24 Hours)     ** No results found for the last 24 hours. **               I have reviewed the medications:  Scheduled Meds:acetaminophen, 650 mg, Oral, Q8H  cetirizine, 5 mg, Oral, Daily  citalopram, 40 mg, Oral,  Daily  famotidine, 20 mg, Oral, BID  Fat Emul Fish Oil/Plant Based, 150 mL, Intravenous, Q24H (TPN)  heparin (porcine), 5,000 Units, Subcutaneous, Q8H  lactobacillus acidophilus, 1 capsule, Oral, Daily  losartan, 50 mg, Oral, Daily  sodium chloride, 10 mL, Intravenous, Q12H  sodium chloride, 10 mL, Intravenous, Q12H      Continuous Infusions:Adult Central 2-in-1 TPN, , Last Rate: 45 mL/hr at 11/08/20 1829  Adult Central 2-in-1 TPN,   Pharmacy to Dose TPN,       PRN Meds:.diazePAM  •  HYDROcodone-acetaminophen  •  HYDROmorphone **AND** naloxone  •  magnesium sulfate **OR** magnesium sulfate **OR** magnesium sulfate  •  ondansetron **OR** ondansetron  •  Pharmacy to Dose TPN  •  potassium chloride **OR** potassium chloride **OR** potassium chloride  •  sodium chloride  •  sodium chloride  •  sodium chloride    Assessment/Plan   Assessment & Plan     Active Hospital Problems    Diagnosis  POA   • **Diverticulitis [K57.92]  Yes   • Colonic diverticular abscess [K57.20]  Yes   • Essential hypertension [I10]  Yes      Resolved Hospital Problems   No resolved problems to display.        Brief Hospital Course to date:  Daja Hubbard is a 60 y.o. female with medical history significant for HTN.  She was admitted for scheduled lab colon resection and drainage of sigmoid abscess due to diverticulitis.      This patient's problems and plans were partially entered by my partner and updated as appropriate by me 11/09/20.    Assessment/plan:  Patient is new to me today    Diveritcular Abscess  S/p lap colon resection, abscess drainage  -wound culture rare pseudomonas  -Dr Arias with ID following   -Plan for Zosyn through 11/17/2020  -PICC line in place   -Dr. Barney following   --A.m. labs    S/P cystoscopy, Olsen  -Performed per Dr. Chang  --Olsen has since been removed.  Voiding well spontaneously.    AARTI-resolved  Hyponatremia-improving/resolved  Hyperkalemia-resolved   -cr 0.78, Na is 132, down from 135 all at last check  yesterday.  -Was advanced to soft GI diet bland diet yesterday per Dr. Morris.  States she does not feel ready for much solid food.  --Will have dietitian see as patient prefers strawberry boost.  States the pre-/post juices are too sweet for her.  --TPN still infusing at 45 cc an hour.  Wean per colorectal Recs.  PICC line intact.  -repeat BMP in am     HTN  - continue losartan    --Stable     Depression  - continue celexa       DVT Prophylaxis:  Subcutaneous Hep      Disposition: I expect the patient to be discharged TBD.     CODE STATUS:   Code Status and Medical Interventions:   Ordered at: 11/03/20 1059     Level Of Support Discussed With:    Patient     Code Status:    CPR     Medical Interventions (Level of Support Prior to Arrest):    Full       Cheryle Jackson, APRN  11/09/20

## 2020-11-10 VITALS
DIASTOLIC BLOOD PRESSURE: 80 MMHG | WEIGHT: 191.9 LBS | BODY MASS INDEX: 32.76 KG/M2 | TEMPERATURE: 98.4 F | HEIGHT: 64 IN | HEART RATE: 73 BPM | SYSTOLIC BLOOD PRESSURE: 138 MMHG | OXYGEN SATURATION: 97 % | RESPIRATION RATE: 16 BRPM

## 2020-11-10 LAB
ANION GAP SERPL CALCULATED.3IONS-SCNC: 10 MMOL/L (ref 5–15)
BASOPHILS # BLD MANUAL: 0.06 10*3/MM3 (ref 0–0.2)
BASOPHILS NFR BLD AUTO: 1 % (ref 0–1.5)
BUN SERPL-MCNC: 7 MG/DL (ref 6–20)
BUN/CREAT SERPL: 9 (ref 7–25)
CALCIUM SPEC-SCNC: 9 MG/DL (ref 8.6–10.5)
CHLORIDE SERPL-SCNC: 102 MMOL/L (ref 98–107)
CO2 SERPL-SCNC: 24 MMOL/L (ref 22–29)
CREAT SERPL-MCNC: 0.78 MG/DL (ref 0.57–1)
DEPRECATED RDW RBC AUTO: 47.9 FL (ref 37–54)
EOSINOPHIL # BLD MANUAL: 0.12 10*3/MM3 (ref 0–0.4)
EOSINOPHIL NFR BLD MANUAL: 2 % (ref 0.3–6.2)
ERYTHROCYTE [DISTWIDTH] IN BLOOD BY AUTOMATED COUNT: 14.3 % (ref 12.3–15.4)
GFR SERPL CREATININE-BSD FRML MDRD: 76 ML/MIN/1.73
GLUCOSE SERPL-MCNC: 111 MG/DL (ref 65–99)
HCT VFR BLD AUTO: 31.8 % (ref 34–46.6)
HGB BLD-MCNC: 10.6 G/DL (ref 12–15.9)
LYMPHOCYTES # BLD MANUAL: 1.84 10*3/MM3 (ref 0.7–3.1)
LYMPHOCYTES NFR BLD MANUAL: 10 % (ref 5–12)
LYMPHOCYTES NFR BLD MANUAL: 32 % (ref 19.6–45.3)
MCH RBC QN AUTO: 30.9 PG (ref 26.6–33)
MCHC RBC AUTO-ENTMCNC: 33.3 G/DL (ref 31.5–35.7)
MCV RBC AUTO: 92.7 FL (ref 79–97)
MONOCYTES # BLD AUTO: 0.58 10*3/MM3 (ref 0.1–0.9)
NEUTROPHILS # BLD AUTO: 2.7 10*3/MM3 (ref 1.7–7)
NEUTROPHILS NFR BLD MANUAL: 41 % (ref 42.7–76)
NEUTS BAND NFR BLD MANUAL: 6 % (ref 0–5)
PLAT MORPH BLD: NORMAL
PLATELET # BLD AUTO: 273 10*3/MM3 (ref 140–450)
PMV BLD AUTO: 10.8 FL (ref 6–12)
POTASSIUM SERPL-SCNC: 3.8 MMOL/L (ref 3.5–5.2)
RBC # BLD AUTO: 3.43 10*6/MM3 (ref 3.77–5.28)
RBC MORPH BLD: NORMAL
SODIUM SERPL-SCNC: 136 MMOL/L (ref 136–145)
VARIANT LYMPHS NFR BLD MANUAL: 8 % (ref 0–5)
WBC # BLD AUTO: 5.75 10*3/MM3 (ref 3.4–10.8)
WBC MORPH BLD: NORMAL

## 2020-11-10 PROCEDURE — 85007 BL SMEAR W/DIFF WBC COUNT: CPT | Performed by: NURSE PRACTITIONER

## 2020-11-10 PROCEDURE — 63710000001 ONDANSETRON PER 8 MG: Performed by: INTERNAL MEDICINE

## 2020-11-10 PROCEDURE — 80048 BASIC METABOLIC PNL TOTAL CA: CPT | Performed by: NURSE PRACTITIONER

## 2020-11-10 PROCEDURE — 25010000002 HEPARIN (PORCINE) PER 1000 UNITS: Performed by: COLON & RECTAL SURGERY

## 2020-11-10 PROCEDURE — 25010000002 PIPERACILLIN SOD-TAZOBACTAM PER 1 G: Performed by: INTERNAL MEDICINE

## 2020-11-10 PROCEDURE — 85025 COMPLETE CBC W/AUTO DIFF WBC: CPT | Performed by: NURSE PRACTITIONER

## 2020-11-10 PROCEDURE — 99239 HOSP IP/OBS DSCHRG MGMT >30: CPT | Performed by: NURSE PRACTITIONER

## 2020-11-10 RX ORDER — HYDROCODONE BITARTRATE AND ACETAMINOPHEN 7.5; 325 MG/1; MG/1
1 TABLET ORAL EVERY 6 HOURS PRN
Start: 2020-11-10 | End: 2023-02-23

## 2020-11-10 RX ORDER — CETIRIZINE HYDROCHLORIDE 5 MG/1
5 TABLET ORAL DAILY
Start: 2020-11-11 | End: 2023-02-23

## 2020-11-10 RX ORDER — FAMOTIDINE 20 MG/1
20 TABLET, FILM COATED ORAL 2 TIMES DAILY
Qty: 60 TABLET | Refills: 0 | Status: SHIPPED | OUTPATIENT
Start: 2020-11-10 | End: 2023-02-23

## 2020-11-10 RX ADMIN — TAZOBACTAM SODIUM AND PIPERACILLIN SODIUM 3.38 G: 375; 3 INJECTION, SOLUTION INTRAVENOUS at 05:10

## 2020-11-10 RX ADMIN — LOSARTAN POTASSIUM 50 MG: 25 TABLET, FILM COATED ORAL at 08:41

## 2020-11-10 RX ADMIN — ACETAMINOPHEN 650 MG: 325 TABLET, FILM COATED ORAL at 13:45

## 2020-11-10 RX ADMIN — SODIUM CHLORIDE, PRESERVATIVE FREE 10 ML: 5 INJECTION INTRAVENOUS at 08:42

## 2020-11-10 RX ADMIN — CITALOPRAM HYDROBROMIDE 40 MG: 40 TABLET ORAL at 08:42

## 2020-11-10 RX ADMIN — HEPARIN SODIUM 5000 UNITS: 5000 INJECTION, SOLUTION INTRAVENOUS; SUBCUTANEOUS at 05:09

## 2020-11-10 RX ADMIN — CETIRIZINE HYDROCHLORIDE 5 MG: 10 TABLET, FILM COATED ORAL at 08:42

## 2020-11-10 RX ADMIN — Medication 1 CAPSULE: at 08:41

## 2020-11-10 RX ADMIN — HYDROCODONE BITARTRATE AND ACETAMINOPHEN 1 TABLET: 7.5; 325 TABLET ORAL at 09:56

## 2020-11-10 RX ADMIN — ACETAMINOPHEN 650 MG: 325 TABLET, FILM COATED ORAL at 05:09

## 2020-11-10 RX ADMIN — HYDROCODONE BITARTRATE AND ACETAMINOPHEN 1 TABLET: 7.5; 325 TABLET ORAL at 17:15

## 2020-11-10 RX ADMIN — ONDANSETRON HYDROCHLORIDE 4 MG: 4 TABLET, FILM COATED ORAL at 13:26

## 2020-11-10 RX ADMIN — FAMOTIDINE 20 MG: 20 TABLET, FILM COATED ORAL at 08:42

## 2020-11-10 RX ADMIN — TAZOBACTAM SODIUM AND PIPERACILLIN SODIUM 3.38 G: 375; 3 INJECTION, SOLUTION INTRAVENOUS at 08:41

## 2020-11-10 NOTE — PLAN OF CARE
Goal Outcome Evaluation:  Plan of Care Reviewed With: patient  Progress: improving       Pts VSS.  No nausea or vomiting noted this shift.  Good urinary output tonight.  Ostomy with good output tonight also.  TPN weaned off tonight.  No distress noted.  Condition good.....

## 2020-11-10 NOTE — PROGRESS NOTES
Case Management Discharge Note      Final Note: Patient discharing home today with home infusions and home health.  Patient will have cont. IV Zosyn until 11/17.  Spoke with Rhonda in [facility] home infusion.  Medications are covered with insurnace and patient will have a $0 copay.  Home infusion will prepare medications for patient today, and deliver to bedside.  They will provide teaching prior to discharge on medication administration.  Patient has chosen [facility] health ofr discharge.  Orders placed for skilled nursing to manage IV infusions, weekly lab draws, and PICC line dressing changes.  Spoke with Jordin with [facility].  She has accepted referral for home health and will see patient today.  Patient declines DME at this time.  Follow up appointments have been scheduled and added to AVS.  Family will transport at discharge.         Selected Continued Care - Admitted Since 11/3/2020     Destination    No services have been selected for the patient.              Durable Medical Equipment    No services have been selected for the patient.              Dialysis/Infusion Coordination complete    Service Provider Selected Services Address Phone Fax    [facility] HOME INFUSION  [facility] IV Therapy [address] [id number] [phone]          Home Medical Care Coordination complete    Service Provider Selected Services Address Phone Fax    [facility] HOME CARE  Home Health Services [address] [facility] [address] [phone]          Therapy    No services have been selected for the patient.              Community Resources    No services have been selected for the patient.                Selected Continued Care - Prior Encounters Includes selections from prior encounters from 8/5/2020 to 11/10/2020    Discharged on 10/12/2020 Admission date: 10/6/2020 - Discharge disposition: Home-Health Care Svc    Dialysis/Infusion      Service Provider Selected Services Address Phone Fax    [facility] HOME INFUSION  Infusion and IV Therapy [address], [facility] [address] [phone]          Home Medical Care     Service Provider Selected Services Address Phone Fax    [facility] HOME CARE  Home Health Services [address], [facility] [address] [phone]                         Final Discharge Disposition Code: 06 - home with home health care

## 2020-11-10 NOTE — PROGRESS NOTES
"[facility]    INPATIENT PROGRESS NOTE  11/10/2020      PATIENT NAME: Daja Hubbard  :  1960  MRN:  [id number]  Date of Admission:  11/3/2020      Antimicrobials:  Zosyn    MAR reviewed.     Reason for consultation:  Complicated diverticulitis with abscess    Interval history: Just back from a walk.  No fevers.  Tolerating PO better.  No n/v.  Weaned off of TPN.    ROS:  No fevers/chills overnight.  No new rashes.  No SOB or chest pain.  Denies side effects from antimicrobials.    Objective:  Temp (24hrs), Av.2 °F (36.8 °C), Min:98 °F (36.7 °C), Max:98.4 °F (36.9 °C)    /81   Pulse 82   Temp 98 °F (36.7 °C) (Oral)   Resp 16   Ht 162.6 cm (64\")   Wt 87 kg (191 lb 14.4 oz)   SpO2 97%   BMI 32.94 kg/m²     Physical Examination:  GENERAL: Awake and alert, appears a bit uncomfortable.  LINES:  right arm PICC.  HEENT: Normocephalic, atraumatic.  EOMI. No conjunctival injection or subconjunctival hemorrhage.  CV: RRR. No murmur, rubs, gallops.  Normal S1S2.  LUNGS: Clear to auscultation bilaterally without wheezing, rales, rhonchi. Normal respiratory effort.  ABDOMEN: Soft, nontender, mildly distended. Positive bowel sounds.  Left lower quadrant drain is out.  Right-sided ileostomy in place with liquid output.  Midline surgical dressing in place, clean.  EXT:  No clubbing, cyanosis, or edema.    MSK: No joint effusions or inflammation noted.  SKIN: Warm and dry without rash or ulcer.   NEURO: A&Ox4. No focal deficits.  Face symmetric.  Speech fluent.  Moves all extremities well.   PSYCHIATRIC: Normal insight and judgement.  Cooperative.  Normal affect.       Laboratory Data:    Results from last 7 days   Lab Units 11/10/20  0549 20  0637 20  0420   WBC 10*3/mm3 5.75 7.06 7.86   HEMOGLOBIN g/dL 10.6* 9.8* 10.0*   HEMATOCRIT % 31.8* 30.8* 31.8*   PLATELETS 10*3/mm3 273 238 213     Results from last 7 days   Lab Units 11/10/20  0549   SODIUM mmol/L 136 "   POTASSIUM mmol/L 3.8   CHLORIDE mmol/L 102   CO2 mmol/L 24.0   BUN mg/dL 7   CREATININE mg/dL 0.78   GLUCOSE mg/dL 111*   CALCIUM mg/dL 9.0     Results from last 7 days   Lab Units 11/09/20  0555   ALK PHOS U/L 59   BILIRUBIN mg/dL 0.2   ALT (SGPT) U/L 6   AST (SGOT) U/L 9         Results from last 7 days   Lab Units 11/09/20  0555   CRP mg/dL 1.81*     Estimated Creatinine Clearance: 82.9 mL/min (by C-G formula based on SCr of 0.78 mg/dL).                    Microbiology:  Microbiology Results (last 10 days)     Procedure Component Value - Date/Time    Anaerobic Culture - Wound, Rectal Abscess [[id number]] Collected: 11/03/20 1000    Lab Status: Final result Specimen: Wound from Rectal Abscess Updated: 11/08/20 0719     Anaerobic Culture No anaerobes isolated at 5 days    Wound Culture - Wound, Rectal Abscess [[id number]]  (Abnormal)  (Susceptibility) Collected: 11/03/20 1000    Lab Status: Final result Specimen: Wound from Rectal Abscess Updated: 11/06/20 0932     Wound Culture Rare Pseudomonas aeruginosa      Scant growth (1+) Normal Urogenital Aruna     Gram Stain Many (4+) Red blood cells      Rare (1+) WBCs seen      Few (2+) Gram negative bacilli    Susceptibility      Pseudomonas aeruginosa     JONO     Cefepime Susceptible     Ceftazidime Susceptible     Ciprofloxacin Susceptible     Gentamicin Susceptible     Levofloxacin Susceptible     Piperacillin + Tazobactam Susceptible                    COVID PRE-OP / PRE-PROCEDURE SCREENING ORDER (NO ISOLATION) - Swab, Nasopharynx [[id number]] Collected: 11/01/20 1113    Lab Status: Final result Specimen: Swab from Nasopharynx Updated: 11/01/20 2052    Narrative:      The following orders were created for panel order COVID PRE-OP / PRE-PROCEDURE SCREENING ORDER (NO ISOLATION) - Swab, Nasopharynx.  Procedure                               Abnormality         Status                     ---------                               -----------         ------                      COVID-19,[facility], NP ...[[id number]]                      Final result                 Please view results for these tests on the individual orders.    COVID-19,[facility], NP SWAB IN [facility] VIRAL TRANSPORT MEDIA 24-30 HR TAT - Swab, Nasopharynx [[id number]] Collected: 11/01/20 1113    Lab Status: Final result Specimen: Swab from Nasopharynx Updated: 11/01/20 2052     SARS-CoV-2 WILLIAN Not Detected            Radiology:  Imaging Results (Last 72 Hours)     ** No results found for the last 72 hours. **          DISCUSSION:  60 y.o. female with history of recurrent diverticulitis who is admitted for planned colon resection.  Operative findings included ongoing inflammation in the sigmoid colon with contained perforation with abscess.  This was drained and culture was obtained.  S/p LAR with loop ileostomy.  Patient had recently completed a 2-week course of ertapenem followed by oral Augmentin.    PROBLEM LIST:   --Complicated sigmoid diverticulitis with contained perforation and abscess, status post surgical drainage.  Culture with pansensitive Pseudomonas.  --History of recurrent episodes of diverticulitis.  Status post low anterior resection with loop ileostomy 11/3/2020.  --AARTI.  Postop.  Now resolved.  --N/V/distention, suspect post-op ileus, s/p NGT.  Improved.     PLAN:  --Continue Zosyn  --PICC in place  --Abx through 11/17/20  --Will have CM arrange:  Zosyn 13.5 g IV continuous infusion daily via PICC through 11/17/20, weekly PICC dressing change, and weekly labs - CBC/diff, CMP, ESR, CRP - fax to [facility] office at [phone].  --Followup with me next week.  --OK for discharge from my perspective.    Plan discussed with patient.    Osmar Arias MD  11/10/2020  11:30 EST

## 2020-11-10 NOTE — DISCHARGE SUMMARY
[facility] Medicine Services  DISCHARGE SUMMARY    Patient Name: Daja Hubbard  : 1960  MRN: [id number]    Date of Admission: 11/3/2020  6:08 AM  Date of Discharge:  11/10/2020  Primary Care Physician: Sp Marie MD    Consults     Date and Time Order Name Status Description    11/3/2020 1021 Inpatient Infectious Diseases Consult Completed     10/7/2020 0032 Inpatient Infectious Diseases Consult Completed     10/6/2020 1500 Inpatient Colorectal Surgery Consult Completed           Hospital Course     Presenting Problem:   Diverticulitis [K57.92]  Diverticulitis [K57.92]    Active Hospital Problems    Diagnosis  POA   • **Diverticulitis [K57.92]  Yes   • Colonic diverticular abscess [K57.20]  Yes   • Essential hypertension [I10]  Yes      Resolved Hospital Problems   No resolved problems to display.          Hospital Course:  Daja Hubbard is a 60 y.o. female with medical history significant for HTN.  She was admitted for scheduled lab colon resection and drainage of sigmoid abscess due to diverticulitis.      Diveritcular Abscess  S/p lap colon resection, abscess drainage  -wound culture rare pseudomonas  -Dr Arias with ID following   -Plan for Zosyn through 2020  -PICC line in place.  Antibiotic infusions/teaching done at bedside prior to discharge per infectious disease team.  All antibiotics and supplies provided to patient prior to discharge.  Home health nursing, lab PICC dressing changes  -Dr. Barney following      S/P cystoscopy, Olsen  -Performed per Dr. Chang  --Olsen has since been removed.  Voiding well spontaneously.  --Follow-up with urology require per       AARTI-resolved  Hyponatremia-improving/resolved  Hyperkalemia-resolved   -cr 0.78, Na is 132, down from 135 all at last check yesterday.  -Was advanced to soft GI diet bland diet yesterday per Dr. Morris.  States she does not feel ready for much solid food.  --TPN weaned off.   PICC line to  remain in place for home antibiotic infusions.      HTN  - continue losartan    --Stable     Depression  - continue celexa    Today patient is seen resting in bed in no acute distress.  No visitors at bedside.  States she feels much better.  Has PICC line in place dry and intact.  Overall tolerating solid diet.  Having good output from stoma.  Pain is controlled at 4/10.  WOCN has been educated on ostomy care and patient has supplies at bedside.  Currently hemodynamically stable and afebrile.  Continue with Zosyn infusion now up to begin running.  All other supplies for wound care.  Infusions at bedside.  Patient will discharge home today and follow-up colorectal surgery infectious disease as noted.      Discharge Follow Up Recommendations for outpatient labs/diagnostics:  Patient is cleared for discharge home today by all services.    Home with PICC line in place and continuous Zosyn antibiotic infusion per  DC recommendations.    Case management has arranged for home health, skilled nursing, labs, PICC dressing changes.  WOC has done ostomy teaching provided patient with supplies at bedside prior to discharge.  ID has done antibiotic teaching and supplies prior to discharge.    Follow-up PCP 1 week of discharge  Follow-up with Dr. Barney in 6-10 days  Follow-up Dr. Osmar Arias next week  No follow-up required with Dr. Chang with urology at this time.  Follow-up as needed.    Day of Discharge     HPI:   Patient is seen resting up in bed awake and alert.  No acute distress.  No visitors at bedside.  Smiling and conversant.  States she feels better today.  Has right PICC line in place.  Tolerating solid diet.  Rates pain to incision currently 4/10.  Having increased green liquid output of her stoma.  Ports she has already been educated on continuous IV antibiotic infusion and care.  No new issues.  Eager to get home today.  Agreeable to home health and home infusion per ID recommendations via her PICC  line.    Review of Systems  Gen- No fevers, chills  CV- No chest pain, palpitations  Resp- No cough, dyspnea  GI- No N/V/D, mild abdominal pain      Vital Signs:   Temp:  [97.8 °F (36.6 °C)-98.4 °F (36.9 °C)] 98.4 °F (36.9 °C)  Heart Rate:  [64-82] 73  Resp:  [16] 16  BP: (116-166)/(77-94) 138/80     Physical Exam:  Constitutional: No acute distress, awake, alert, lying in bed, no visitors at bedside.  HENT: NCAT, mucous membranes moist  Respiratory: Clear to auscultation bilaterally A/P, respiratory effort normal on RA with sats WNL.  Cardiovascular: RRR, no murmurs, rubs, or gallops, palpable pedal pulses bilaterally  Gastrointestinal: Positive bowel sounds, some mild distention noted (stable).  Appropriately mildly tender in area surrounding ostomy and incision.  Small amount of dark green liquid stool noted in RLQ ostomy bag.  Stoma pink, moist, raised.  Small Mepilex dressing to left LLQ with old dried staining.  Midline vertical dressing C/D/I.  Musculoskeletal: No bilateral ankle edema.  GOINS spontaneously.  Psychiatric: Appropriate affect, cooperative and calm.  Neurologic: Oriented x 3, speech clear and appropriate.  Follows commands. Nonfocal.   Skin: No rashes noted to exposed areas of skin, warm and dry.  Right upper extremity PICC line in place C/d/i.     Pertinent  and/or Most Recent Results     Results from last 7 days   Lab Units 11/10/20  0549 11/09/20  0555 11/08/20  0637 11/07/20  0420 11/06/20  0449 11/05/20  0438 11/04/20  1721 11/04/20  0438   WBC 10*3/mm3 5.75  --  7.06 7.86 9.05 9.84  --  10.29   HEMOGLOBIN g/dL 10.6*  --  9.8* 10.0* 10.9* 9.8*  --  9.6*   HEMATOCRIT % 31.8*  --  30.8* 31.8* 33.2* 29.8*  --  30.1*   PLATELETS 10*3/mm3 273  --  238 213 231 216  --  201   SODIUM mmol/L 136 136 132* 136  135* 135* 128* 128* 136   POTASSIUM mmol/L 3.8 4.7 3.9 4.5  4.2 4.6 4.3 5.0 5.8*   CHLORIDE mmol/L 102 102 102 106  107 105 100 100 105   CO2 mmol/L 24.0 27.0 21.0* 17.0*  20.0* 21.0* 21.0*  19.0* 22.0   BUN mg/dL 7 9 8 9  9 10 17 17 15   CREATININE mg/dL 0.78 0.80 0.78 0.88  0.83 0.99 1.50* 1.55* 1.75*   GLUCOSE mg/dL 111* 120* 122* 85  85 91 100* 122* 125*   CALCIUM mg/dL 9.0 8.7 8.1* 8.3*  8.1* 8.5* 9.0 9.3 9.1     Results from last 7 days   Lab Units 11/09/20  0555 11/08/20  0637 11/07/20  0420   BILIRUBIN mg/dL 0.2 0.2 0.2   ALK PHOS U/L 59 56 55   ALT (SGPT) U/L 6 6 5   AST (SGOT) U/L 9 11 12     Results from last 7 days   Lab Units 11/07/20  0420   CHOLESTEROL mg/dL 127   TRIGLYCERIDES mg/dL 109           Brief Urine Lab Results  (Last result in the past 365 days)      Color   Clarity   Blood   Leuk Est   Nitrite   Protein   CREAT   Urine HCG        10/06/20 0958 Dark Yellow Cloudy Negative Small (1+) Positive 100 mg/dL (2+)               Microbiology Results Abnormal     Procedure Component Value - Date/Time    Anaerobic Culture - Wound, Rectal Abscess [[id number]] Collected: 11/03/20 1000    Lab Status: Final result Specimen: Wound from Rectal Abscess Updated: 11/08/20 0719     Anaerobic Culture No anaerobes isolated at 5 days    Wound Culture - Wound, Rectal Abscess [[id number]]  (Abnormal)  (Susceptibility) Collected: 11/03/20 1000    Lab Status: Final result Specimen: Wound from Rectal Abscess Updated: 11/06/20 0932     Wound Culture Rare Pseudomonas aeruginosa      Scant growth (1+) Normal Urogenital Aruna     Gram Stain Many (4+) Red blood cells      Rare (1+) WBCs seen      Few (2+) Gram negative bacilli    Susceptibility      Pseudomonas aeruginosa     JONO     Cefepime Susceptible     Ceftazidime Susceptible     Ciprofloxacin Susceptible     Gentamicin Susceptible     Levofloxacin Susceptible     Piperacillin + Tazobactam Susceptible                          Imaging Results (All)     None                         Plan for Follow-up of Pending Labs/Results:     Discharge Details        Discharge Medications      New Medications      Instructions Start Date   cetirizine 5 MG  tablet  Commonly known as: zyrTEC   5 mg, Oral, Daily   Start Date: November 11, 2020     famotidine 20 MG tablet  Commonly known as: PEPCID   20 mg, Oral, 2 Times Daily      HYDROcodone-acetaminophen 7.5-325 MG per tablet  Commonly known as: NORCO   1 tablet, Oral, Every 6 Hours PRN, See hard rx per Dr Barney      piperacillin-tazobactam 3-0.375 GM/50ML IVPB  Commonly known as: ZOSYN   3.375 g, Intravenous, Every 8 Hours, Continuous IV zosyn infusion per Dr Arias         Continue These Medications      Instructions Start Date   cholecalciferol 25 MCG (1000 UT) tablet  Commonly known as: VITAMIN D3   2,000 Units, Oral, Daily      citalopram 40 MG tablet  Commonly known as: CeleXA   40 mg, Oral, Daily      lactobacillus acidophilus capsule capsule   1 capsule, Oral, Daily      losartan 25 MG tablet  Commonly known as: COZAAR   50 mg, Oral, Daily      ondansetron 8 MG tablet  Commonly known as: ZOFRAN   8 mg, Oral, Every 8 Hours PRN         Stop These Medications    amoxicillin-clavulanate 875-125 MG per tablet  Commonly known as: AUGMENTIN     cyclobenzaprine 10 MG tablet  Commonly known as: FLEXERIL     ferrous sulfate 325 (65 FE) MG tablet     meloxicam 15 MG tablet  Commonly known as: MOBIC            Allergies   Allergen Reactions   • Sulfa Antibiotics Hives     Itching, swelling         Discharge Disposition:  Home or Self Care    Diet:  Hospital:  Diet Order   Procedures   • Diet Regular; Low Fiber / Low Residue       Activity:  Activity Instructions     Activity as Tolerated            Restrictions or Other Recommendations:       CODE STATUS:    Code Status and Medical Interventions:   Ordered at: 11/03/20 1059     Level Of Support Discussed With:    Patient     Code Status:    CPR     Medical Interventions (Level of Support Prior to Arrest):    Full       Future Appointments   Date Time Provider Department Center   12/16/2020 10:00 AM Suman Ortiz MD [facility]       Additional Instructions for  the Follow-ups that You Need to Schedule     Ambulatory Referral to Home Health   As directed      Face to Face Visit Date: 11/10/2020    Follow-up provider for Plan of Care?: I treated the patient in an acute care facility and will not continue treatment after discharge.    Follow-up provider: MYRTLE MARIE [[id number]]    Reason/Clinical Findings: diverticulitis    Describe mobility limitations that make leaving home difficult: impaired functional mobility, balance and gait.  Decreased strength and generalized weakness    Nursing/Therapeutic Services Requested: Skilled Nursing    Skilled nursing orders: Medication education Wound care dressing/changes Infusion therapy    Instructions: Zosyn 13.5 g IV continuous infusion daily via PICC through 11/17/20, weekly PICC dressing change, and weekly labs - CBC/diff, CMP, ESR, CRP         Discharge Follow-up with PCP   As directed       Currently Documented PCP:    Myrtle Marie MD    PCP Phone Number:    [phone]     Follow Up Details: 1 week of dc (please schedule appointment for patient prior to discharge home today)         Discharge Follow-up with Specialty: Follow-up with Dr. Osmar Arias with [facility] next week (please ensure appointment is scheduled prior to discharge)   As directed      Specialty: Follow-up with Dr. Osmar Arias with [facility] next week (please ensure appointment is scheduled prior to discharge)         Discharge Follow-up with Specified Provider: Follow-up Dr. Kojo Grande in 6-10 days (please schedule appointment for patient prior to discharge home today)   As directed      To: Follow-up Dr. Kojo Grande in 6-10 days (please schedule appointment for patient prior to discharge home today)               BRENNON Ornelas  11/10/20      Time Spent on Discharge:  I spent 55  minutes on this discharge activity which included: face-to-face encounter with the patient, reviewing the data in the system, coordination of the care with the nursing  staff as well as consultants, documentation, and entering orders.

## 2020-11-10 NOTE — PLAN OF CARE
Goal Outcome Evaluation:  Plan of Care Reviewed With: patient  Progress: improving  Outcome Summary: VSS, room air. Pt has no complaints of pain. Plans for discharge today. no concerns at this time. Will continue to monitor.

## 2020-11-10 NOTE — PROGRESS NOTES
Clinical Nutrition     Nutrition Support Assessment  Reason for Visit:   Follow-up protocol    Patient Name: Daja Hubbard  YOB: 1960  MRN: [id number]  Date of Encounter: 11/10/20 13:41 EST  Admission date: 11/3/2020      -Provided patient with ileostomy nutrition education and written materials.    Nutrition Assessment   Assessment     Admission diagnosis  Recurrent diverticulitis, diverticular abscess    Additional applicable diagnosis/conditions/procedures this adm:  Recent [facility] admission 10/6 - 10/12 diverticulitis, abdominal pain    Abdominal pain, cramping, diarrhea  (11/3) s/p LAR, drainage of sigmoid abscess, appy, loop ileostomy, cystoscopy ureteral cath/stent insertion  Postop AARTI, resolved  Hyperkalemia  Post-op ileus    () NGT placed  () PICC placed and TPN initiated  () NGT discontinued; full liquid diet --> low-fiber diet; TPN @ 45 ml/hr per CRS MD  () TPN discontinued    PMH/PSxH:   PMH: She  has a past medical history of Anemia, Cervical high risk human papillomavirus (HPV) DNA test positive (2015), Cervical stricture or stenosis, Depression, Diverticulitis, Fibromyalgia, History of kidney stones, Hypertension, Low grade squamous intraepithelial lesion (LGSIL) on cervical Pap smear (2020), Mild dysplasia of cervix, Mild dysplasia of cervix (2020), Mild dysplasia of cervix (DEV I) (2020), Recurrent UTI, Tobacco dependence, and Urge incontinence.   PSxH: She  has a past surgical history that includes Hernia repair;  section; Colposcopy (2015); Kidney stone surgery; Bladder surgery; Colonoscopy; LEEP (2020); Incision and Drainage Hematoma (Right); Colectomy (N/A, 11/3/2020); and Cystoscopy w/ ureteral stent placement (11/3/2020).       Reported/Observed/Food/Nutrition Related History:   Per nursing doc I/Os (within past 24 hours):  Ileostomy output = 400 ml  UOP = 950 ml    Patient reports she did not eat much at  "dinner last night, but today has eaten food and tolerated it. At breakfast consumed eggs and bread as well as 1/2 carton Boost Plus. At lunch ate 1/2 a sandwich. Denies N/V. Anticipates discharge home today.    Anthropometrics     Height: 162.6 cm (64\")  Last filed wt: Weight: 87 kg (191 lb 14.4 oz) (11/10/20 0500)  Weight Method: Bed scale    BMI: BMI (Calculated): 32.9  Obese Class I: 30-34.9kg/m2    Ideal Body Weight (IBW) (kg): 55  Admission wt: 180 lb  Method obtained: stated weight per charting 11/3    Weight Change   UBW: ~180 lbs per patient  Weight change: none     Weight Weight (kg) Weight (lbs) Weight Method VISIT REPORT   12/19/2019 81.647 kg 180 lb Stated    12/19/2019 81.557 kg 179 lb 12.8 oz Standing scale    1/16/2020 77.111 kg 170 lb     9/11/2020 84.959 kg 187 lb 4.8 oz     10/6/2020 77.111 kg 170 lb Stated    10/6/2020 83.19 kg 183 lb 6.4 oz     10/30/2020 81.92 kg 180 lb 9.6 oz Standing scale    11/3/2020 81.647 kg 180 lb Stated        Labs reviewed   Labs reviewed: Yes    Medications reviewed   Medications reviewed: Yes  Pertinent: pepcid, probiotic, antibiotic  PRN: zofran    Needs Assessment (11/7)     Height used  162.6 cm (64 in)   Weight used  81.6 kg (180 lbs) - stated weight on admission         Estimated need Method/Equation used Result    Energy/Calorie need  ~1600 kcals/d  20 - 22 kcal/kg actBW  1632 - 1795 kcal             Protein   ~95 g/day  1.0 - 1.2 g/kg actBW  82 - 98                 Current Nutrition Prescription   PO: Low fiber  Dietary Nutrition Supplements: Boost Plus 3x daily    Intake/Evaluation of Received Nutrient/Fluid Intake:  PO - insufficient data    Nutrition Diagnosis     11/7 (updated 11/9, 11/10)  Problem Inadequate oral intake   Etiology Decreased appetite   Signs/Symptoms Patient reports she did not eat much at dinner last night; consumed eggs, bread, and 1/2 carton Boost Plus at breakfast and 1/2 sandwich at lunch   Status: ongoing/improving    11/7  Problem " Altered GI function   Etiology Alteration in GI tract structure   Signs/Symptoms Complicated diverticulitis s/p LAR, loop ileostomy requiring NGT   Status: resolved 11/10    11/9  Problem Food and nutrition knowledge deficit   Etiology No prior need for education   Signs/Symptoms New ileostomy   Status: resolved 11/10 - RD provided educatio    Nutrition Intervention    Follow treatment progress, Care plan reviewed, Encourage intake, Education provided     -Provided patient with ileostomy nutrition education and written materials. RD reviewed low-fiber diet x 4-6 weeks post-op, slow re-introduction of high-fiber foods after 4-6 weeks, daily chewable/liquid MVI, high ileostomy output/dehydration/oral rehydration solutions, dietary strategies for managing high ileostomy output, food lists pertaining to gas/odor/blockage/diarrhea and stool thickening, appropriate oral nutrition supplements, and recommended daily protein intake for post-op wound healing.    Goal:   General: Nutrition support treatment  PO: Increase intake    Monitoring/Evaluation:   Per protocol, I&O, PO intake, Supplement intake, Pertinent labs, Weight, GI status, Symptoms    Fay Orozco RD  Time Spent: 45 minutes

## 2020-11-10 NOTE — NURSING NOTE
Inpatient Ostomy Therapy Note    Date of Surgery: 11/3/20      Days Post Procedure:  7 Days Post-Op    Surgeon:  Kojo Barney MD    Ostomy:  Loop Ileostomy- RLQ    Appliance Type:  [place] and 1 Piece Convex    Appliance Size:  Size: 2-3/4    Stoma Description: Viable, Red and Moist    Stoma Size:  32 X 51 mm    Peristomal Skin:  Mucocutaneous separation    Last Appliance Change:  11/10/20    Accessories:  Stomahesive Paste, Stoma Powder and Barrier Spray    Education:  Diet, Showering, Travel, Emptying, Changing Appliance, Ordering Supplies, Outpatient Followup, Ostomysecrets, Treating Diarrhea, Peristomal Skin Issues and Crusting    Dressing Change:  No    Supplies Provided:  Yes    Education Folder Provided:  Yes    Plan of Care:  WOC Visit    Teaching provided to:  Patient    Comments:  WOC nurse f/u for loop ileostomy.    Stoma red and moist; minimal protrusion above skin level. Small area of mc separation at 2-3:00 where bridge was removed - crusted with stoma powder and barrier spray. Pt will likely benefit from shallow convexity due to minimal protrusion of stoma above skin level. [place] #[id number] 1 piece convex appliance used, cut to 32 X 51 mm with paste around opening. Pt performed appliance change with help of WOCN.     Summary:  Education reviewed, as above. Pt was very engaged in the educational process.Pt understands how to contact WOC nurse as needed after discharge. Given discharge supplies. Please contact WOC nurse for concerns.       Jocelyne Taylor, APRN - 11/10/20, 12:31 PM EST

## 2020-11-11 ENCOUNTER — READMISSION MANAGEMENT (OUTPATIENT)
Dept: CALL CENTER | Facility: HOSPITAL | Age: 60
End: 2020-11-11

## 2020-11-11 NOTE — OUTREACH NOTE
Prep Survey      Responses   [facility] [place] facility patient discharged from?  [place]   Is LACE score < 7 ?  No   Eligibility  Readm Mgmt   Discharge diagnosis  **Diverticulitis    Does the patient have one of the following disease processes/diagnoses(primary or secondary)?  General Surgery   Does the patient have Home health ordered?  Yes   What is the Home health agency?   [facility]    Is there a DME ordered?  No   Prep survey completed?  Yes          Emeli Finnegan RN

## 2020-11-13 ENCOUNTER — READMISSION MANAGEMENT (OUTPATIENT)
Dept: CALL CENTER | Facility: HOSPITAL | Age: 60
End: 2020-11-13

## 2020-11-13 NOTE — OUTREACH NOTE
General Surgery Week 1 Survey      Responses   [facility] patient discharged from?  [place]   Does the patient have one of the following disease processes/diagnoses(primary or secondary)?  General Surgery   Week 1 attempt successful?  Yes   Call start time  1006   Call end time  1014   Discharge diagnosis  Diverticulitis    Is patient permission given to speak with other caregiver?  No   List who call center can speak with  Patient only   Meds reviewed with patient/caregiver?  Yes   Is the patient having any side effects they believe may be caused by any medication additions or changes?  No   Does the patient have all medications related to this admission filled (includes all antibiotics, pain medications, etc.)  Yes   Is the patient taking all medications as directed (includes completed medication regime)?  Yes   Medication comments  PICC line for IV antibiotics   Does the patient have a follow up appointment scheduled with their surgeon?  Yes   Has the patient kept scheduled appointments due by today?  N/A   Comments  Dr Barney 11/23/2020           Dr Arias 11/18/200   What is the Home health agency?   [facility]    Has home health visited the patient within 72 hours of discharge?  Yes   Home health comments  Home Health teaching care of colostomy   Has all DME been delivered?  Yes   Psychosocial issues?  No   Did the patient receive a copy of their discharge instructions?  Yes   Nursing interventions  Reviewed instructions with patient   What is the patient's perception of their health status since discharge?  Improving   Nursing interventions  Nurse provided patient education   Is the patient /caregiver able to teach back basic post-op care?  Take showers only when approved by MD-sponge bathe until then, Drive as instructed by MD in discharge instructions, Practice 'cough and deep breath', No tub bath, swimming, or hot tub until instructed by MD, Keep incision areas clean,dry and  protected, Do not remove steri-strips, Lifting as instructed by MD in discharge instructions, Continue use of incentive spirometry at least 1 week post discharge   Is the patient/caregiver able to teach back signs and symptoms of incisional infection?  Increased redness, swelling or pain at the incisonal site, Increased drainage or bleeding, Incisional warmth, Pus or odor from incision, Fever   Is the patient/caregiver able to teach back steps to recovery at home?  Eat a well-balance diet, Rest and rebuild strength, gradually increase activity   Is the patient/caregiver able to teach back the hierarchy of who to call/visit for symptoms/problems? PCP, Specialist, Home health nurse, Urgent Care, ED, 911  Yes   Week 1 call completed?  Yes          Magdalena Patrick RN

## 2020-11-18 ENCOUNTER — TRANSCRIBE ORDERS (OUTPATIENT)
Dept: WOUND CARE | Facility: HOSPITAL | Age: 60
End: 2020-11-18

## 2020-11-18 ENCOUNTER — HOSPITAL ENCOUNTER (OUTPATIENT)
Dept: WOUND CARE | Facility: HOSPITAL | Age: 60
Discharge: HOME | End: 2020-11-18
Admitting: COLON & RECTAL SURGERY

## 2020-11-18 DIAGNOSIS — Z71.89 OSTOMY NURSE CONSULTATION: Primary | ICD-10-CM

## 2020-11-18 PROCEDURE — G0463 HOSPITAL OUTPT CLINIC VISIT: HCPCS | Performed by: NURSE PRACTITIONER

## 2020-11-18 NOTE — NURSING NOTE
Pt seen as out-patient by LAURA for ileostomy. Pt has loop ileostomy done 11/3 per Dr. Barney for diverticulitis. She has been working with home health, who has been having difficulty calculating how to cut the wafer; referred pt to WO for evaluation. No problems with leaking appliances.     Stoma red and moist with minimal protrusion above skin level mc separation on medial side of stoma. Stoma irregularly shaped. Mildly erythematous peristomal skin. Pt needs shallow convexity; has been using [place] #[id number] since discharge.     Stoma powder applied to area of mc separation; barrier spray applied to peristomal skin. [place] #[id number] convex appliance used, cut about 32 X 41mm - to fit around stoma and cover area of mc separation; Leonid paste applied around wafer opening. Appliance belt then applied for support. Pt and  understand pouching technique; given stencil for wafer cutting area.     Reviewed pt's protein intake. Pt encouraged to eat 100-120 Gm of protein daily.     Stool varies from applesauce to watery. Pt taking Imodium 2 tabs daily at this time. Pt advised to increase Imodium to 4 tabs daily (1 tab at ac and HS); may increase to up to 8 tabs daily to achieve stool consistency of applesauce.     Pt encouraged to increase fluid intake to 80-96 oz daily, including 1-2 Gatorade/pedialyte daily.      present for visit.     Pt to f/u with LAURA Nov. 23 at 1000.

## 2020-11-20 ENCOUNTER — READMISSION MANAGEMENT (OUTPATIENT)
Dept: CALL CENTER | Facility: HOSPITAL | Age: 60
End: 2020-11-20

## 2020-11-20 NOTE — OUTREACH NOTE
General Surgery Week 2 Survey      Responses   [facility] patient discharged from?  [place]   Does the patient have one of the following disease processes/diagnoses(primary or secondary)?  General Surgery   Week 2 attempt successful?  No   Unsuccessful attempts  Attempt 1          Cyndi Sierra RN

## 2020-11-23 ENCOUNTER — HOSPITAL ENCOUNTER (OUTPATIENT)
Dept: WOUND CARE | Facility: HOSPITAL | Age: 60
Discharge: HOME | End: 2020-11-23
Admitting: COLON & RECTAL SURGERY

## 2020-11-23 ENCOUNTER — READMISSION MANAGEMENT (OUTPATIENT)
Dept: CALL CENTER | Facility: HOSPITAL | Age: 60
End: 2020-11-23

## 2020-11-23 PROCEDURE — G0463 HOSPITAL OUTPT CLINIC VISIT: HCPCS | Performed by: NURSE PRACTITIONER

## 2020-11-23 NOTE — NURSING NOTE
Pt seen by [facility] as out-patient for f/u ileostomy.    Pt reports no leakage from ostomy appliance wafer - Ruth #[id number]; appliance last changed 11/20; likes the support of the appliance belt. Increased Imodium to 4 tabs/day; stool continues to alternate between watery to pudding consistency; developed abdominal cramping with this increased dose. Pt saw Dr. Barney this morning, who advised her to take Imodium 3 tabs daily. Pt has increased her protein intake.     Stoma red and moist; 25 X 38 mm. Area of mc separation medial to stoma granulating well. Peristomal skin intact. Pt has shallow dip in skin at 9:00. Stool very watery today.    Stomal powder applied to area of mc separation, then Leonid paste. 1/3 Ruth ring flattened and placed on lateral side of stoma. [place] #[id number] convex appliance used, cut to 25 X 38 mm with stomal paste around opening. Appliance belt applied.     Pt advised to take Imodium 3 tabs daily to help thicken her stool; continue to drink plenty of fluids and good protein intake. Two extra Ruth #[id number] appliances and 2 stoma rings given to pt.     Pt is to f/u Tuesday, Dec. 1 at 0900.

## 2020-11-23 NOTE — OUTREACH NOTE
General Surgery Week 2 Survey      Responses   [facility] patient discharged from?  [facility]   Does the patient have one of the following disease processes/diagnoses(primary or secondary)?  General Surgery   Week 2 attempt successful?  No   Unsuccessful attempts  Attempt 2          Isacc Germain RN

## 2020-12-01 ENCOUNTER — TRANSCRIBE ORDERS (OUTPATIENT)
Dept: WOUND CARE | Facility: HOSPITAL | Age: 60
End: 2020-12-01

## 2020-12-01 ENCOUNTER — HOSPITAL ENCOUNTER (OUTPATIENT)
Dept: WOUND CARE | Facility: HOSPITAL | Age: 60
Discharge: HOME | End: 2020-12-01
Admitting: COLON & RECTAL SURGERY

## 2020-12-01 DIAGNOSIS — Z71.89 OSTOMY NURSE CONSULTATION: Primary | ICD-10-CM

## 2020-12-01 PROCEDURE — G0463 HOSPITAL OUTPT CLINIC VISIT: HCPCS

## 2020-12-01 NOTE — NURSING NOTE
Mrs. Hubbard was seen this morning for follow-up assessment of MC separation and peristomal skin breakdown.     Her current appliance is a [place] Premier [id number] convexity.     At this time her peristomal skin is intact and dry.   Stoma looks good.  She has has minimal leaking except for last night.   MC separation as has resolved, however there is noted hypergranulation.   Area is staying to wet.     Silver nitrate applied.   Stomal care performed.  I placed her in a Coloplast 1-piece shallow soft convexity cut at [id number].   Was able to achieve a good seal.   I the place her in a [place] belt.     Reviewed care needs and provided extra appliances for her to take home.   I will also send samples to patients home.     She has a follow-up with me for reassessment on Wednesday, December 8 at 0800.     Thanks

## 2020-12-02 ENCOUNTER — READMISSION MANAGEMENT (OUTPATIENT)
Dept: CALL CENTER | Facility: HOSPITAL | Age: 60
End: 2020-12-02

## 2020-12-02 NOTE — OUTREACH NOTE
General Surgery Week 3 Survey      Responses   [facility] patient discharged from?  [facility]   Does the patient have one of the following disease processes/diagnoses(primary or secondary)?  General Surgery   Week 3 attempt successful?  No   Unsuccessful attempts  Attempt 1          Noni Rojas RN

## 2020-12-07 ENCOUNTER — READMISSION MANAGEMENT (OUTPATIENT)
Dept: CALL CENTER | Facility: HOSPITAL | Age: 60
End: 2020-12-07

## 2020-12-09 ENCOUNTER — HOSPITAL ENCOUNTER (OUTPATIENT)
Dept: WOUND CARE | Facility: HOSPITAL | Age: 60
Discharge: HOME | End: 2020-12-09
Admitting: COLON & RECTAL SURGERY

## 2020-12-09 PROCEDURE — G0463 HOSPITAL OUTPT CLINIC VISIT: HCPCS | Performed by: NURSE PRACTITIONER

## 2020-12-09 NOTE — NURSING NOTE
Pt f/u with [facility] as out-patient for f/u ileostomy.    Pt reports Coloplast appliance has worked well for her; she likes the shorter profile. Has changed Q3days; the edges of the appliance have come loose at times. Stool loose, but has thickened to applesauce consistency.     Stoma red and moist; wound healed; small areas of hypergranulation 12-2:00 and at 7:00. Stoma protrudes slightly above skin level; peristomal surface flat.    AgNO3 applied to areas of hypergranulation. Stoma 22 X 35 mm. Coloplast 1-piece convex appliance used, cut to 22 X 35 mm. Barrier strips applied -  assisted.  Appliance belt applied.     Pt and  understand how to change appliance. She needs no f/u at this time. She understands how to contact [facility] nurse as needed.

## 2021-01-22 ENCOUNTER — TRANSCRIBE ORDERS (OUTPATIENT)
Dept: ADMINISTRATIVE | Facility: HOSPITAL | Age: 61
End: 2021-01-22

## 2021-01-22 DIAGNOSIS — K57.92 DIVERTICULITIS: Primary | ICD-10-CM

## 2021-02-22 ENCOUNTER — HOSPITAL ENCOUNTER (OUTPATIENT)
Dept: GENERAL RADIOLOGY | Facility: HOSPITAL | Age: 61
Discharge: HOME | End: 2021-02-22
Admitting: COLON & RECTAL SURGERY

## 2021-02-22 DIAGNOSIS — K57.92 DIVERTICULITIS: ICD-10-CM

## 2021-02-22 PROCEDURE — 74270 X-RAY XM COLON 1CNTRST STD: CPT

## 2021-02-22 PROCEDURE — 0 DIATRIZOATE MEGLUMINE & SODIUM PER 1 ML: Performed by: COLON & RECTAL SURGERY

## 2021-02-22 RX ADMIN — DIATRIZOATE MEGLUMINE AND DIATRIZOATE SODIUM 480 ML: 660; 100 LIQUID ORAL; RECTAL at 11:16

## 2022-03-21 RX ORDER — CITALOPRAM 40 MG/1
TABLET ORAL
Qty: 90 TABLET | Refills: 1 | Status: SHIPPED | OUTPATIENT
Start: 2022-03-21 | End: 2023-02-23

## 2022-04-25 RX ORDER — MELOXICAM 15 MG/1
TABLET ORAL
Qty: 30 TABLET | Refills: 0 | Status: SHIPPED | OUTPATIENT
Start: 2022-04-25 | End: 2022-06-02

## 2022-04-25 NOTE — TELEPHONE ENCOUNTER
Rx Refill Note    Requested Prescriptions     Pending Prescriptions Disp Refills   • meloxicam (MOBIC) 15 MG tablet [Pharmacy Med Name: MELOXICAM 15 MG TABLET] 30 tablet      Sig: TAKE 1 TABLET BY MOUTH EVERY DAY        Last office visit with prescribing clinician:  12/03/2021  Next office visit with prescribing clinician: 5/11/2022   Last labs:03/05/2021  Last refill: 02/17/2022  Pharmacy [place] [facility]

## 2022-06-02 RX ORDER — MELOXICAM 15 MG/1
TABLET ORAL
Qty: 30 TABLET | Refills: 0 | Status: SHIPPED | OUTPATIENT
Start: 2022-06-02 | End: 2022-06-28

## 2022-06-02 NOTE — TELEPHONE ENCOUNTER
Rx Refill Note    Requested Prescriptions     Pending Prescriptions Disp Refills   • meloxicam (MOBIC) 15 MG tablet [Pharmacy Med Name: MELOXICAM 15 MG TABLET] 30 tablet 0     Sig: TAKE 1 TABLET BY MOUTH EVERY DAY        Last office visit with prescribing clinician: Visit date not found      Next office visit with prescribing clinician: Visit date not found   Last labs:   Last refill: 04/25/22  Pharmacy (be sure to add in Epic). correct

## 2022-06-28 RX ORDER — MELOXICAM 15 MG/1
TABLET ORAL
Qty: 30 TABLET | Refills: 0 | Status: SHIPPED | OUTPATIENT
Start: 2022-06-28 | End: 2022-07-25

## 2022-06-28 NOTE — TELEPHONE ENCOUNTER
Rx Refill Note    Requested Prescriptions     Pending Prescriptions Disp Refills   • meloxicam (MOBIC) 15 MG tablet [Pharmacy Med Name: MELOXICAM 15 MG TABLET] 30 tablet 0     Sig: TAKE 1 TABLET BY MOUTH EVERY DAY        Last office visit with prescribing clinician: Visit date not found      Next office visit with prescribing clinician: Visit date not found   Last labs:   Last refill: 06/02/2022   Pharmacy (be sure to add in Epic). correct

## 2022-07-25 RX ORDER — MELOXICAM 15 MG/1
TABLET ORAL
Qty: 30 TABLET | Refills: 0 | Status: SHIPPED | OUTPATIENT
Start: 2022-07-25 | End: 2022-11-03

## 2022-07-25 NOTE — TELEPHONE ENCOUNTER
Rx Refill Note    Requested Prescriptions     Pending Prescriptions Disp Refills   • meloxicam (MOBIC) 15 MG tablet [Pharmacy Med Name: MELOXICAM 15 MG TABLET] 30 tablet 0     Sig: TAKE 1 TABLET BY MOUTH EVERY DAY        Last office visit with prescribing clinician: 05/12/2021 via nextgen      Next office visit with prescribing clinician: Visit date not found   Last labs:   Last refill: 06/28/2022 for 30    Pharmacy (be sure to add in Epic). correct

## 2022-08-29 RX ORDER — MELOXICAM 15 MG/1
TABLET ORAL
Qty: 30 TABLET | Refills: 0 | OUTPATIENT
Start: 2022-08-29

## 2022-09-23 DIAGNOSIS — I10 ESSENTIAL (PRIMARY) HYPERTENSION: ICD-10-CM

## 2022-09-23 RX ORDER — VALSARTAN 80 MG/1
TABLET ORAL
Qty: 90 TABLET | Refills: 0 | OUTPATIENT
Start: 2022-09-23

## 2022-10-27 RX ORDER — CITALOPRAM 40 MG/1
TABLET ORAL
Qty: 90 TABLET | Refills: 1 | OUTPATIENT
Start: 2022-10-27

## 2022-10-27 NOTE — TELEPHONE ENCOUNTER
Rx Refill Note    Requested Prescriptions     Pending Prescriptions Disp Refills   • citalopram (CeleXA) 40 MG tablet [Pharmacy Med Name: CITALOPRAM HBR 40 MG TABLET] 90 tablet 1     Sig: TAKE 1 TABLET BY MOUTH EVERY DAY        Last office visit with prescribing clinician:       Next office visit with prescribing clinician: Visit date not found   Last labs:   Last refill:    Pharmacy   Needs appt almost 1 year and last seen by Fiona

## 2022-11-02 RX ORDER — VALSARTAN 80 MG/1
80 TABLET ORAL DAILY
Qty: 30 TABLET | Refills: 0 | Status: SHIPPED | OUTPATIENT
Start: 2022-11-02 | End: 2022-11-03

## 2022-11-02 RX ORDER — VALSARTAN 80 MG/1
80 TABLET ORAL DAILY
COMMUNITY
End: 2022-11-02 | Stop reason: SDUPTHER

## 2022-11-02 NOTE — TELEPHONE ENCOUNTER
I had to call the pharmacy to make sure I put in her chart correctly, patient last refill was June 23rd for 90 days   Last seen 05/11/22  Labs 11/10/20 in epic

## 2022-11-03 RX ORDER — MELOXICAM 15 MG/1
TABLET ORAL
Qty: 30 TABLET | Refills: 0 | Status: SHIPPED | OUTPATIENT
Start: 2022-11-03

## 2022-11-03 RX ORDER — VALSARTAN 80 MG/1
TABLET ORAL
Qty: 90 TABLET | Refills: 1 | Status: SHIPPED | OUTPATIENT
Start: 2022-11-03

## 2022-11-03 NOTE — TELEPHONE ENCOUNTER
Rx Refill Note    Requested Prescriptions     Pending Prescriptions Disp Refills   • meloxicam (MOBIC) 15 MG tablet [Pharmacy Med Name: MELOXICAM 15 MG TABLET] 30 tablet 0     Sig: TAKE 1 TABLET BY MOUTH EVERY DAY        Last office visit with prescribing clinician: Visit date not found      Next office visit with prescribing clinician: 11/16/2022   Last labs:   Last refill: 07/25/2022   Pharmacy (be sure to add in Epic). correct

## 2022-11-30 RX ORDER — MELOXICAM 15 MG/1
TABLET ORAL
Qty: 30 TABLET | Refills: 0 | OUTPATIENT
Start: 2022-11-30

## 2023-01-27 RX ORDER — MELOXICAM 15 MG/1
TABLET ORAL
Qty: 30 TABLET | Refills: 0 | OUTPATIENT
Start: 2023-01-27

## 2023-01-27 RX ORDER — CITALOPRAM 40 MG/1
TABLET ORAL
Qty: 30 TABLET | Refills: 0 | OUTPATIENT
Start: 2023-01-27

## 2023-02-16 ENCOUNTER — TELEPHONE (OUTPATIENT)
Dept: FAMILY MEDICINE CLINIC | Facility: CLINIC | Age: 63
End: 2023-02-16
Payer: COMMERCIAL

## 2023-02-16 ENCOUNTER — TRANSCRIBE ORDERS (OUTPATIENT)
Dept: ADMINISTRATIVE | Facility: HOSPITAL | Age: 63
End: 2023-02-16
Payer: COMMERCIAL

## 2023-02-16 DIAGNOSIS — Z00.00 PHYSICAL EXAM: ICD-10-CM

## 2023-02-16 DIAGNOSIS — I10 HYPERTENSION, ESSENTIAL: Primary | ICD-10-CM

## 2023-02-16 DIAGNOSIS — Z12.31 VISIT FOR SCREENING MAMMOGRAM: Primary | ICD-10-CM

## 2023-02-16 NOTE — TELEPHONE ENCOUNTER
Caller: Daja Hubbard    Relationship: Self    Best call back number: [phone]    What orders are you requesting (i.e. lab or imaging): LAB FOR BLOOD PRESSURE, ETC.    In what timeframe would the patient need to come in: ASAP    Where will you receive your lab/imaging services: OFFICE

## 2023-02-23 ENCOUNTER — OFFICE VISIT (OUTPATIENT)
Dept: OBSTETRICS AND GYNECOLOGY | Facility: CLINIC | Age: 63
End: 2023-02-23
Payer: COMMERCIAL

## 2023-02-23 VITALS
DIASTOLIC BLOOD PRESSURE: 70 MMHG | SYSTOLIC BLOOD PRESSURE: 120 MMHG | WEIGHT: 175.4 LBS | HEIGHT: 64 IN | BODY MASS INDEX: 29.94 KG/M2

## 2023-02-23 DIAGNOSIS — K57.20 COLONIC DIVERTICULAR ABSCESS: ICD-10-CM

## 2023-02-23 DIAGNOSIS — Z01.419 ENCOUNTER FOR ANNUAL ROUTINE GYNECOLOGICAL EXAMINATION: Primary | ICD-10-CM

## 2023-02-23 DIAGNOSIS — N88.2 CERVICAL STRICTURE OR STENOSIS: ICD-10-CM

## 2023-02-23 DIAGNOSIS — N95.2 ATROPHIC VAGINITIS: ICD-10-CM

## 2023-02-23 DIAGNOSIS — N39.46 MIXED STRESS AND URGE URINARY INCONTINENCE: ICD-10-CM

## 2023-02-23 DIAGNOSIS — N87.0 MILD DYSPLASIA OF CERVIX (CIN I): ICD-10-CM

## 2023-02-23 DIAGNOSIS — Z12.31 ENCOUNTER FOR SCREENING MAMMOGRAM FOR MALIGNANT NEOPLASM OF BREAST: ICD-10-CM

## 2023-02-23 PROCEDURE — 99396 PREV VISIT EST AGE 40-64: CPT | Performed by: OBSTETRICS & GYNECOLOGY

## 2023-02-23 RX ORDER — MULTIPLE VITAMINS W/ MINERALS TAB 9MG-400MCG
1 TAB ORAL DAILY
COMMUNITY

## 2023-02-23 NOTE — PROGRESS NOTES
Gynecologic Annual Exam Note        GYN Annual Exam     CC - Here for annual exam.        HPI  Daja Hubbard is a 62 y.o. female, , who presents for annual well woman exam as a previous patient not seen in the last three years.  She is postmenopausal. Denies vaginal bleeding.  Patient reports problems with: hot flashes, night sweats and mild urinary incontinence. There were no changes to her medical or surgical history since her last visit.. Partner Status: Marital Status: .  She is is not currently sexually active. STD testing recommendations have been explained to the patient and she does not desire STD testing.    Additional OB/GYN History   On HRT? No    Last Pap : 20. Results: LSIL. HPV: not done.   Last Completed Pap Smear          Ordered - PAP SMEAR (Every 3 Years) Ordered on 2020  Done - in [place] (epithelial cell abnormality; LSIL              History of abnormal Pap smear: yes - led to LEEP on 20- LSIL with HPV related changes, ECC scant sample with limited detached inflammatory cells and rare epithelial cells without atypia   Family history of uterine, colon, breast, or ovarian cancer: no   Performs monthly Self-Breast Exam: no  Last mammogram: 3/14/17. Done at  . Has one scheduled for 2023    Last Completed Mammogram     This patient has no relevant Health Maintenance data.        Last colonoscopy: has had a colonoscopy 2 year(s) ago.    Last Completed Colonoscopy     This patient has no relevant Health Maintenance data.            Last bone density scan (DEXA): None  Exercises Regularly: yes  Feelings of Anxiety or Depression: no      Tobacco Usage?: No       Current Outpatient Medications:   •  cholecalciferol (VITAMIN D3) 25 MCG (1000 UT) tablet, Take 2,000 Units by mouth Daily., Disp: , Rfl:   •  meloxicam (MOBIC) 15 MG tablet, TAKE 1 TABLET BY MOUTH EVERY DAY (Patient taking differently: Taking every other day), Disp: 30 tablet, Rfl:  0  •  multivitamin with minerals tablet tablet, Take 1 tablet by mouth Daily., Disp: , Rfl:   •  valsartan (DIOVAN) 80 MG tablet, TAKE 1 TABLET BY MOUTH EVERY DAY, Disp: 90 tablet, Rfl: 1    Patient denies the need for medication refills today.    OB History        2    Para   2    Term   2            AB        Living           SAB        IAB        Ectopic        Molar        Multiple        Live Births                    Past Medical History:   Diagnosis Date   • Anemia    • Cervical high risk human papillomavirus (HPV) DNA test positive 2015    NON 16/18 POSITIVE   • Cervical stricture or stenosis     cervical stricture and stenosis   • Depression    • Diverticulitis    • Fibromyalgia    • History of kidney stones    • Hypertension    • Kidney stone    • Low grade squamous intraepithelial lesion (LGSIL) on cervical Pap smear 2020    colposcopy 2020 with BX at 6 and ECC; unsatisfactory; thick WE extending inside OS   • Mild dysplasia of cervix     (DEV I)   • Mild dysplasia of cervix 2020    ECC positive LGSIL; colposcopy unsatisfactory with WE extending inside os.   • Mild dysplasia of cervix (DEV I) 2020   • Recurrent UTI    • Tobacco dependence    • Urge incontinence     has a bladder stimulator   • Varicella         Past Surgical History:   Procedure Laterality Date   • APPENDECTOMY     • BLADDER SURGERY      implant for bladder   •  SECTION     • COLON RESECTION N/A 2020    Procedure: LAPROSCOPIC LOWER ANTERIOR RESECTION, LOOP ILEOSTOMY CREATION, MOBOLIZATION OF SPLENIC FLEXURE, APPENDECTOMY;  Surgeon: Kojo Barney MD;  Location: [facility] OR;  Service: General;  Laterality: N/A;   • COLONOSCOPY     • COLPOSCOPY  2015   • CYSTOSCOPY W/ URETERAL STENT PLACEMENT  2020    Procedure: CYSTOSCOPY URETERAL CATHETER INSERTION;  Surgeon: Kory Chang Jr., MD;  Location: [facility] OR;  Service: Urology;;   • HERNIA REPAIR     • INCISION AND  "DRAINAGE HEMATOMA Right     inner thigh   • KIDNEY STONE SURGERY     • LEEP  08/11/2020   • OSTOMY TAKE DOWN      3/2021       Health Maintenance   Topic Date Due   • COVID-19 Vaccine (1) Never done   • ZOSTER VACCINE (1 of 2) Never done   • MAMMOGRAM  03/14/2019   • HEPATITIS C SCREENING  Never done   • ANNUAL PHYSICAL  Never done   • INFLUENZA VACCINE  08/01/2022   • COLORECTAL CANCER SCREENING  12/26/2022   • PAP SMEAR  02/28/2023   • Annual Gynecologic Pelvic and Breast Exam  02/24/2024   • TDAP/TD VACCINES (3 - Tdap) 08/30/2028   • Pneumococcal Vaccine 0-64  Aged Out       The additional following portions of the patient's history were reviewed and updated as appropriate: allergies, current medications, past family history, past medical history, past social history, past surgical history and problem list.    Review of Systems   Constitutional: Negative.    HENT: Negative.    Eyes: Negative.    Respiratory: Negative.    Cardiovascular: Negative.    Gastrointestinal: Negative.    Endocrine: Positive for heat intolerance (hot flashes, night sweats ).   Genitourinary: Positive for urinary incontinence (mild ).   Musculoskeletal: Negative.    Skin: Negative.    Allergic/Immunologic: Negative.    Neurological: Negative.    Hematological: Negative.    Psychiatric/Behavioral: Negative.        I have reviewed and agree with the HPI, ROS, and historical information as entered above. Suman Ortiz MD    Objective   /70   Ht 162.6 cm (64\")   Wt 79.6 kg (175 lb 6.4 oz)   BMI 30.11 kg/m²     Physical Exam  Vitals and nursing note reviewed. Exam conducted with a chaperone present.   Constitutional:       Appearance: Normal appearance. She is well-developed.   HENT:      Head: Normocephalic and atraumatic.   Neck:      Thyroid: No thyroid mass or thyromegaly.   Cardiovascular:      Rate and Rhythm: Normal rate and regular rhythm.      Heart sounds: Normal heart sounds. No murmur heard.  Pulmonary:      Effort: " Pulmonary effort is normal. No retractions.      Breath sounds: Normal breath sounds. No wheezing, rhonchi or rales.   Chest:      Chest wall: No mass or tenderness.   Breasts:     Right: Normal. No mass, nipple discharge, skin change or tenderness.      Left: Normal. No mass, nipple discharge, skin change or tenderness.   Abdominal:      General: Bowel sounds are normal.      Palpations: Abdomen is soft. Abdomen is not rigid. There is no mass.      Tenderness: There is no abdominal tenderness. There is no guarding.      Hernia: No hernia is present. There is no hernia in the left inguinal area.   Genitourinary:     General: Normal vulva.      Labia:         Right: No rash, tenderness or lesion.         Left: No rash, tenderness or lesion.       Vagina: Normal. No vaginal discharge or lesions.      Cervix: No cervical motion tenderness, discharge, lesion or cervical bleeding.      Uterus: Normal. Not enlarged, not fixed and not tender.       Adnexa:         Right: No mass or tenderness.          Left: No mass or tenderness.        Rectum: No external hemorrhoid.      Comments: Atrophy and flattening of rugae. Cx flush with vaginal vault; Stenotic os. Uterus mobile small. Ovaries non palpable.   Musculoskeletal:      Cervical back: Normal range of motion. No muscular tenderness.   Neurological:      Mental Status: She is alert and oriented to person, place, and time.   Psychiatric:         Behavior: Behavior normal.            Assessment and Plan    Problem List Items Addressed This Visit     Atrophic vaginitis    Overview     Declines RX         Mild dysplasia of cervix (DEV I)    Overview     LEEP performed 8/11/2020 due to unsatisfactory colposcopy.> Low grade dysplasia;     Did not return for f/u paps.          Urinary incontinence    Overview      InterStim placement August 2020.         Colonic diverticular abscess    Cervical stricture or stenosis    Overview     cervical stricture and stenosis          Encounter for screening mammogram for malignant neoplasm of breast    Overview     Mamm scheduled.         Other Visit Diagnoses     Encounter for annual routine gynecological examination    -  Primary    Relevant Orders    LIQUID-BASED PAP SMEAR, [facility] ([facility])          1. GYN annual well woman exam. 61 yo;  2. S/p LEEP 8/2022 with LGSIL; did not return for f/u pap.   3. Pap with HPV screen done. Can repeat in one yr if negative.   4.  Mammogram overdue. Scheduled.   5. H/o ruptured diverticular abcess; s/p ileostomy and reanastomosis.   6. Reviewed monthly self breast exams.  Instructed to call with lumps, pain, or breast discharge.  Yearly mammograms ordered.  7. Ordered mammogram today.  8. Recommended use of Vitamin D and getting adequate calcium in her diet. (1500mg)  9. Reviewed exercise as a preventative health measures.   10. Recommended Flu Vaccine in Fall of each year.  11. RTC in 1 year or PRN with problems.  12. Return in about 1 year (around 2/23/2024) for Annual physical.     Suman Ortiz MD  02/23/2023

## 2023-03-01 LAB — REF LAB TEST METHOD: NORMAL

## 2023-03-08 ENCOUNTER — HOSPITAL ENCOUNTER (OUTPATIENT)
Dept: MAMMOGRAPHY | Facility: HOSPITAL | Age: 63
Discharge: HOME | End: 2023-03-08
Admitting: OBSTETRICS & GYNECOLOGY
Payer: COMMERCIAL

## 2023-03-08 DIAGNOSIS — Z12.31 VISIT FOR SCREENING MAMMOGRAM: ICD-10-CM

## 2023-03-08 PROCEDURE — 77063 BREAST TOMOSYNTHESIS BI: CPT | Performed by: RADIOLOGY

## 2023-03-08 PROCEDURE — 77063 BREAST TOMOSYNTHESIS BI: CPT

## 2023-03-08 PROCEDURE — 77067 SCR MAMMO BI INCL CAD: CPT | Performed by: RADIOLOGY

## 2023-03-08 PROCEDURE — 77067 SCR MAMMO BI INCL CAD: CPT

## 2023-03-09 ENCOUNTER — OFFICE VISIT (OUTPATIENT)
Dept: OBSTETRICS AND GYNECOLOGY | Facility: CLINIC | Age: 63
End: 2023-03-09
Payer: COMMERCIAL

## 2023-03-09 VITALS
SYSTOLIC BLOOD PRESSURE: 140 MMHG | BODY MASS INDEX: 30.39 KG/M2 | DIASTOLIC BLOOD PRESSURE: 90 MMHG | WEIGHT: 178 LBS | HEIGHT: 64 IN

## 2023-03-09 DIAGNOSIS — R87.620 ASCUS WITH POSITIVE HIGH RISK HUMAN PAPILLOMAVIRUS OF VAGINA: Primary | ICD-10-CM

## 2023-03-09 DIAGNOSIS — N95.2 ATROPHIC VAGINITIS: ICD-10-CM

## 2023-03-09 DIAGNOSIS — R87.811 ASCUS WITH POSITIVE HIGH RISK HUMAN PAPILLOMAVIRUS OF VAGINA: Primary | ICD-10-CM

## 2023-03-09 DIAGNOSIS — N87.0 MILD DYSPLASIA OF CERVIX (CIN I): ICD-10-CM

## 2023-03-09 DIAGNOSIS — R87.810 CERVICAL HIGH RISK HUMAN PAPILLOMAVIRUS (HPV) DNA TEST POSITIVE: ICD-10-CM

## 2023-03-09 PROCEDURE — 57455 BIOPSY OF CERVIX W/SCOPE: CPT | Performed by: OBSTETRICS & GYNECOLOGY

## 2023-03-09 PROCEDURE — 99213 OFFICE O/P EST LOW 20 MIN: CPT | Performed by: OBSTETRICS & GYNECOLOGY

## 2023-03-09 RX ORDER — ESTRADIOL 0.1 MG/G
0.5 CREAM VAGINAL 3 TIMES WEEKLY
Qty: 1 EACH | Refills: 4 | Status: SHIPPED | OUTPATIENT
Start: 2023-03-10 | End: 2023-04-09

## 2023-03-09 NOTE — PROGRESS NOTES
Colposcopy Procedure Note        Procedures    Indications: Daja Hubbard is a 62 y.o. female, , whose No LMP recorded. Patient is postmenopausal..  She presents for follow up for evaluation of an abnormal PAP smear that showed ASCUS with POSITIVE high risk HPV. Prior cervical treatment includes: prior colposcopy and LEEP. She understands the need for the procedure and is aware of the complications, including post-colposcopic vaginal bleeding, vaginal leukorrhea or cervicitis.  She is aware she may experience discomfort.  After being presented with the risk, benefits, and alternatives the patient wished to proceed.      Urine pregnancy test done in the office today was UPT not done.      The patient has not had Gardasil.  She is not a smoker.      Procedure Details   The risks and benefits of the procedure and Verbal informed consent obtained.    She was positioned in the dorsal lithotomy position and a speculum was inserted into the vagina and excellent visualization of cervix achieved, cervix swabbed x 3 with acetic acid solution. The transformation zone was not completely visualized due to cervical stenosis.  A cervical biopsy was not obtained at 000 o'clock.  An endocervical curettage was not performed.  This colposcopy was satisfactory.     Findings: Cx small and flush with vaginal wall. Os stenotic. No cervical lesions.  Possible WE bilateral vaginal side wall.   The procedure was notable for: discomfort and vasovagal response to small vaginal wall biopsy.   Physical Exam  Vitals and nursing note reviewed. Exam conducted with a chaperone present.   Genitourinary:           Comments: Cx os stenotic. Cx flush with vaginal wall. No cervical WE.     Neurological:      Mental Status: She is alert and oriented to person, place, and time.   Psychiatric:         Behavior: Behavior normal.         Specimens: Biopsy right vaginal side wall.   Specimens labelled and sent to Pathology.    Complications:  pain, vasovagal episode.    The patient tolerated the procedure poorly and with severe discomfort and vasovagal response.    Assessment and Plan    Problem List Items Addressed This Visit     Atrophic vaginitis    Overview     3/9/23; Rx estradiol vaginal cream.          Mild dysplasia of cervix (DEV I)    Overview     LEEP performed 8/11/2020 due to unsatisfactory colposcopy.> Low grade dysplasia;     Did not return for f/u paps.     Pap smear 2/23/2023 with ASCUS; HPV high risk Pool positive.  HPV 16/18/45 negative.  3/9/2023 colposcopy with possible bilateral vaginal wall WE; small bx right vaginal side wall WE.            Cervical high risk human papillomavirus (HPV) DNA test positive    Overview     NON 16/18 POSITIVE    Pap smear 2/23/2023 with ASCUS; HPV high risk Pool positive.  HPV 16/18/45 negative.  Needs colposcopy.        Other Visit Diagnoses     ASCUS with positive high risk human papillomavirus of vagina    -  Primary    Relevant Orders    TISSUE EXAM, [facility] ([facility])          1. Will base further treatment on Pathology findings.  2. Begin Rx for vaginal estradiol cream.   3. Treatment options discussed with patient.  4. Post biopsy instructions given to patient.  5. Repeat PAP in 6 months.     Suman Ortiz MD  03/09/2023

## 2023-03-14 DIAGNOSIS — N89.0 VAIN I (VAGINAL INTRAEPITHELIAL NEOPLASIA GRADE I): Primary | ICD-10-CM

## 2023-03-14 LAB — REF LAB TEST METHOD: NORMAL

## 2023-03-30 ENCOUNTER — TELEPHONE (OUTPATIENT)
Dept: GYNECOLOGIC ONCOLOGY | Facility: CLINIC | Age: 63
End: 2023-03-30
Payer: COMMERCIAL

## 2023-03-30 NOTE — TELEPHONE ENCOUNTER
Caller: Daja Hubbard    Relationship: Self    Best call back number: [phone]    What is the best time to reach you: ANYTIME    Who are you requesting to speak with (clinical staff, provider, specific staff member): SCHEDULING    What was the call regarding: PLEASE CALL PT TO R/S HER MISSED NEW PT APPT FROM 3/29.     Do you require a callback: YES

## 2023-04-19 ENCOUNTER — OFFICE VISIT (OUTPATIENT)
Dept: FAMILY MEDICINE CLINIC | Facility: CLINIC | Age: 63
End: 2023-04-19
Payer: COMMERCIAL

## 2023-04-19 ENCOUNTER — TELEPHONE (OUTPATIENT)
Dept: FAMILY MEDICINE CLINIC | Facility: CLINIC | Age: 63
End: 2023-04-19
Payer: COMMERCIAL

## 2023-04-19 VITALS
TEMPERATURE: 98.2 F | DIASTOLIC BLOOD PRESSURE: 94 MMHG | OXYGEN SATURATION: 96 % | BODY MASS INDEX: 29.5 KG/M2 | HEIGHT: 64 IN | WEIGHT: 172.8 LBS | HEART RATE: 96 BPM | SYSTOLIC BLOOD PRESSURE: 158 MMHG

## 2023-04-19 DIAGNOSIS — R06.02 SHORTNESS OF BREATH: Primary | ICD-10-CM

## 2023-04-19 DIAGNOSIS — I10 HYPERTENSION, ESSENTIAL: ICD-10-CM

## 2023-04-19 DIAGNOSIS — Z00.00 PHYSICAL EXAM: ICD-10-CM

## 2023-04-19 LAB
EXPIRATION DATE: NORMAL
FLUAV AG UPPER RESP QL IA.RAPID: NOT DETECTED
FLUBV AG UPPER RESP QL IA.RAPID: NOT DETECTED
INTERNAL CONTROL: NORMAL
Lab: NORMAL
SARS-COV-2 AG UPPER RESP QL IA.RAPID: NOT DETECTED

## 2023-04-19 RX ORDER — AZITHROMYCIN 250 MG/1
TABLET, FILM COATED ORAL
Qty: 6 TABLET | Refills: 0 | Status: SHIPPED | OUTPATIENT
Start: 2023-04-19

## 2023-04-19 NOTE — TELEPHONE ENCOUNTER
Caller: Djaa Hubbard ANDRES    Relationship: Self    Best call back number: [phone]    What medications are you currently taking:   Current Outpatient Medications on File Prior to Visit   Medication Sig Dispense Refill   • azithromycin (Zithromax Z-Reginaldo) 250 MG tablet Take 2 tablets by mouth on day 1, then 1 tablet daily on days 2-5 6 tablet 0   • cholecalciferol (VITAMIN D3) 25 MCG (1000 UT) tablet Take 2 tablets by mouth Daily.     • meloxicam (MOBIC) 15 MG tablet TAKE 1 TABLET BY MOUTH EVERY DAY (Patient taking differently: Taking every other day) 30 tablet 0   • multivitamin with minerals tablet tablet Take 1 tablet by mouth Daily.     • valsartan (DIOVAN) 80 MG tablet TAKE 1 TABLET BY MOUTH EVERY DAY 90 tablet 1     No current facility-administered medications on file prior to visit.        What are your concerns: PT CALLED STATED THAT INHALER HAS NOT BEEN RECEIVED BY PHARMACY:[id number] [place] [facility] [address] [phone] [facility] [phone] FX

## 2023-04-19 NOTE — PROGRESS NOTES
"Chief Complaint  Sinusitis (X1.5W. Cough, SOB, ST, drainage. Negative COVID test.)    Subjective        Daja Hubbard presents to [facility]  History of Present Illness  She has been short of breath since Saturday and she has had 2 negative covid tests. She had a sore throat a couple of weeks ago and she had some chills. She cannot have a conversation w/o getting out of breath. She is vaccinated for Covid but has not had flu vaccine this year.  Objective   Vital Signs:  /94 (BP Location: Left arm, Patient Position: Sitting, Cuff Size: Adult)   Pulse 96   Temp 98.2 °F (36.8 °C) (Oral)   Ht 162.6 cm (64\")   Wt 78.4 kg (172 lb 12.8 oz)   SpO2 96%   BMI 29.66 kg/m²   Estimated body mass index is 29.66 kg/m² as calculated from the following:    Height as of this encounter: 162.6 cm (64\").    Weight as of this encounter: 78.4 kg (172 lb 12.8 oz).             Physical Exam  Vitals and nursing note reviewed.   Constitutional:       Appearance: Normal appearance.   HENT:      Head: Normocephalic and atraumatic.      Right Ear: Tympanic membrane and ear canal normal.      Left Ear: Tympanic membrane and ear canal normal.      Nose: Nose normal.      Mouth/Throat:      Pharynx: Oropharynx is clear.   Eyes:      Extraocular Movements: Extraocular movements intact.      Conjunctiva/sclera: Conjunctivae normal.      Pupils: Pupils are equal, round, and reactive to light.   Cardiovascular:      Rate and Rhythm: Normal rate and regular rhythm.      Heart sounds: Normal heart sounds.   Pulmonary:      Effort: Pulmonary effort is normal. No respiratory distress.      Breath sounds: Wheezing and rhonchi present.   Abdominal:      General: Abdomen is flat. Bowel sounds are normal. There is no distension.      Palpations: Abdomen is soft.      Tenderness: There is no abdominal tenderness. There is no guarding or rebound.   Musculoskeletal:         General: Normal range of motion.      " Cervical back: Normal range of motion and neck supple.   Skin:     General: Skin is warm and dry.   Neurological:      General: No focal deficit present.      Mental Status: She is alert and oriented to person, place, and time. Mental status is at baseline.   Psychiatric:         Mood and Affect: Mood normal.         Behavior: Behavior normal.         Thought Content: Thought content normal.         Judgment: Judgment normal.        Result Review :                   Assessment and Plan   Diagnoses and all orders for this visit:    1. Shortness of breath (Primary)  Assessment & Plan:  CXR today. Will Add antibiotic.She is to go to ER for worsening symptoms.     Orders:  -     XR Chest PA & Lateral; Future    2. Hypertension, essential  -     CBC (No Diff); Future  -     Hemoglobin A1c; Future  -     CBC & Differential  -     Lipid Panel  -     Comprehensive Metabolic Panel  -     TSH Rfx On Abnormal To Free T4  -     POCT SARS-CoV-2 Antigen RASHMI  -     azithromycin (Zithromax Z-Reginaldo) 250 MG tablet; Take 2 tablets by mouth on day 1, then 1 tablet daily on days 2-5  Dispense: 6 tablet; Refill: 0    3. Physical exam  -     CBC & Differential  -     Lipid Panel  -     Comprehensive Metabolic Panel  -     TSH Rfx On Abnormal To Free T4           Follow Up   Return in about 2 weeks (around 5/3/2023), or if symptoms worsen or fail to improve.  Patient was given instructions and counseling regarding her condition or for health maintenance advice. Please see specific information pulled into the AVS if appropriate.

## 2023-04-20 ENCOUNTER — TELEPHONE (OUTPATIENT)
Dept: FAMILY MEDICINE CLINIC | Facility: CLINIC | Age: 63
End: 2023-04-20
Payer: COMMERCIAL

## 2023-04-20 ENCOUNTER — TELEPHONE (OUTPATIENT)
Dept: FAMILY MEDICINE CLINIC | Facility: CLINIC | Age: 63
End: 2023-04-20

## 2023-04-20 DIAGNOSIS — R06.02 SHORTNESS OF BREATH: Primary | ICD-10-CM

## 2023-04-20 LAB
ALBUMIN SERPL-MCNC: NORMAL G/DL
ALP SERPL-CCNC: NORMAL U/L
ALT SERPL-CCNC: NORMAL U/L
AST SERPL-CCNC: NORMAL U/L
BASOPHILS # BLD AUTO: 0 X10E3/UL (ref 0–0.2)
BASOPHILS NFR BLD AUTO: 0 %
BILIRUB SERPL-MCNC: NORMAL MG/DL
BUN SERPL-MCNC: NORMAL MG/DL
CALCIUM SERPL-MCNC: NORMAL MG/DL
CHLORIDE SERPL-SCNC: NORMAL MMOL/L
CHOLEST SERPL-MCNC: NORMAL MG/DL
CO2 SERPL-SCNC: NORMAL MMOL/L
CREAT SERPL-MCNC: NORMAL MG/DL
EOSINOPHIL # BLD AUTO: 0 X10E3/UL (ref 0–0.4)
EOSINOPHIL NFR BLD AUTO: 0 %
ERYTHROCYTE [DISTWIDTH] IN BLOOD BY AUTOMATED COUNT: 12.1 % (ref 11.7–15.4)
GLUCOSE SERPL-MCNC: NORMAL MG/DL
HCT VFR BLD AUTO: 38.1 % (ref 34–46.6)
HDLC SERPL-MCNC: NORMAL MG/DL
HGB BLD-MCNC: 12.8 G/DL (ref 11.1–15.9)
IMM GRANULOCYTES # BLD AUTO: 0 X10E3/UL (ref 0–0.1)
IMM GRANULOCYTES NFR BLD AUTO: 0 %
LYMPHOCYTES # BLD AUTO: 2 X10E3/UL (ref 0.7–3.1)
LYMPHOCYTES NFR BLD AUTO: 23 %
MCH RBC QN AUTO: 29.6 PG (ref 26.6–33)
MCHC RBC AUTO-ENTMCNC: 33.6 G/DL (ref 31.5–35.7)
MCV RBC AUTO: 88 FL (ref 79–97)
MONOCYTES # BLD AUTO: 0.6 X10E3/UL (ref 0.1–0.9)
MONOCYTES NFR BLD AUTO: 7 %
NEUTROPHILS # BLD AUTO: 6 X10E3/UL (ref 1.4–7)
NEUTROPHILS NFR BLD AUTO: 70 %
PLATELET # BLD AUTO: 380 X10E3/UL (ref 150–450)
POTASSIUM SERPL-SCNC: NORMAL MMOL/L
PROT SERPL-MCNC: NORMAL G/DL
RBC # BLD AUTO: 4.32 X10E6/UL (ref 3.77–5.28)
SODIUM SERPL-SCNC: NORMAL MMOL/L
SPECIMEN STATUS: NORMAL
TRIGL SERPL-MCNC: NORMAL MG/DL
TSH SERPL DL<=0.005 MIU/L-ACNC: 2.41 UIU/ML (ref 0.45–4.5)
VLDLC SERPL CALC-MCNC: NORMAL MG/DL
WBC # BLD AUTO: 8.7 X10E3/UL (ref 3.4–10.8)

## 2023-04-20 RX ORDER — ALBUTEROL SULFATE 90 UG/1
2 AEROSOL, METERED RESPIRATORY (INHALATION) EVERY 4 HOURS PRN
Qty: 3 G | Refills: 1 | Status: SHIPPED | OUTPATIENT
Start: 2023-04-20

## 2023-04-20 NOTE — TELEPHONE ENCOUNTER
Caller: Daja Hubbard    Relationship: Self    Best call back number: [phone]    What medication are you requesting:  INHALER    What are your current symptoms: SHORTNESS OF BREATH    If a prescription is needed, what is your preferred pharmacy and phone number:  [id number] - [place], [place] - [address] - [phone]  - [phone]       Additional notes:

## 2023-04-21 ENCOUNTER — TELEPHONE (OUTPATIENT)
Dept: FAMILY MEDICINE CLINIC | Facility: CLINIC | Age: 63
End: 2023-04-21

## 2023-04-21 DIAGNOSIS — B37.31 VAGINAL YEAST INFECTION: Primary | ICD-10-CM

## 2023-04-21 LAB
ALBUMIN SERPL-MCNC: 4.8 G/DL (ref 3.8–4.8)
ALBUMIN/GLOB SERPL: 1.7 {RATIO} (ref 1.2–2.2)
ALP SERPL-CCNC: 104 IU/L (ref 44–121)
ALT SERPL-CCNC: 14 IU/L (ref 0–32)
AST SERPL-CCNC: 18 IU/L (ref 0–40)
BILIRUB SERPL-MCNC: 0.2 MG/DL (ref 0–1.2)
BUN SERPL-MCNC: 15 MG/DL (ref 8–27)
BUN/CREAT SERPL: 13 (ref 12–28)
CALCIUM SERPL-MCNC: 10.4 MG/DL (ref 8.7–10.3)
CHLORIDE SERPL-SCNC: 99 MMOL/L (ref 96–106)
CHOLEST SERPL-MCNC: 198 MG/DL (ref 100–199)
CO2 SERPL-SCNC: 17 MMOL/L (ref 20–29)
CREAT SERPL-MCNC: 1.18 MG/DL (ref 0.57–1)
EGFRCR SERPLBLD CKD-EPI 2021: 53 ML/MIN/1.73
GLOBULIN SER CALC-MCNC: 2.8 G/DL (ref 1.5–4.5)
GLUCOSE SERPL-MCNC: 95 MG/DL (ref 70–99)
HDLC SERPL-MCNC: 56 MG/DL
LDLC SERPL CALC-MCNC: 127 MG/DL (ref 0–99)
POTASSIUM SERPL-SCNC: 5.1 MMOL/L (ref 3.5–5.2)
PROT SERPL-MCNC: 7.6 G/DL (ref 6–8.5)
SODIUM SERPL-SCNC: 137 MMOL/L (ref 134–144)
TRIGL SERPL-MCNC: 84 MG/DL (ref 0–149)
VLDLC SERPL CALC-MCNC: 15 MG/DL (ref 5–40)

## 2023-04-21 NOTE — TELEPHONE ENCOUNTER
Patient was seen by Fernanda yesterday and was prescribed antibiotics, but always gets a yeast infection following the cycle of biotics. Wanted to know if she could get prescribed the one pill you take after you've taken all of the antibiotics.     [facility]

## 2023-04-21 NOTE — TELEPHONE ENCOUNTER
----- Message from Sp Marie MD sent at 4/21/2023  7:21 AM EDT -----  Abnormal test, needs an appointment, Thanks.

## 2023-04-21 NOTE — TELEPHONE ENCOUNTER
Called pt and informed inhaler was called in but pt has not seen Dr. Marie in almost a year, she did see obi 4/2023 for shortness of breath, but needs to make a appt to see Dr. Marie

## 2023-04-24 RX ORDER — FLUCONAZOLE 150 MG/1
150 TABLET ORAL ONCE
Qty: 1 TABLET | Refills: 0 | Status: SHIPPED | OUTPATIENT
Start: 2023-04-24 | End: 2023-04-24

## 2023-04-24 NOTE — TELEPHONE ENCOUNTER
Caller: Daja Hubbard    Relationship to patient: Self    Best call back number: [phone]    Patient is needing:     WANTS TO KNOW IF ONE PILL (NOT SURE OF NAME, FOR YEAST INFECTION PREVENTION) WILL BE CALLED IN AFTER TAKING A ROUND OF ANTIBIOTICS

## 2023-04-28 NOTE — DISCHARGE PLACEMENT REQUEST
"Daja St (60 y.o. Female)     Date of Birth Social Security Number Address Home Phone MRN    1960  [address] [place] [id number] [phone] [id number]    [employer] Marital Status          Scientology        Admission Date Admission Type Admitting Provider Attending Provider Department, Room/Bed    11/3/20 Elective Cortes Sainz MD Sloan, Walker E, MD [address], S[address]/1    Discharge Date Discharge Disposition Discharge Destination                       Attending Provider: Cortes Sainz MD    Allergies: Sulfa Antibiotics    Isolation: None   Infection: None   Code Status: CPR    Ht: 162.6 cm (64\")   Wt: 87 kg (191 lb 14.4 oz)    Admission Cmt: None   Principal Problem: Diverticulitis [K57.92]                 Active Insurance as of 11/3/2020     Primary Coverage     Payor Plan Insurance Group Employer/Plan Group    [facility] [id number]     Payor Plan Address Payor Plan Phone Number Payor Plan Fax Number Effective Dates    [address]   1/1/2017 - None Entered    [facility] [id number]       Subscriber Name Subscriber Birth Date Member ID       SHASHI ST 1/15/1955 [id number]                 Emergency Contacts      (Rel.) Home Phone Work Phone Mobile Phone    Shashi St (Spouse) -- -- [phone]        Home infusions arranged with [facility], and home health will be provided by [facility].      Zosyn 13.5 g IV continuous infusion daily via PICC through 11/17/20, weekly PICC dressing change, and weekly labs - CBC/diff, CMP, ESR, CRP    "
28-Apr-2023 08:42

## 2023-05-01 RX ORDER — VALSARTAN 80 MG/1
TABLET ORAL
Qty: 30 TABLET | Refills: 0 | Status: SHIPPED | OUTPATIENT
Start: 2023-05-01

## 2023-05-01 NOTE — TELEPHONE ENCOUNTER
Rx Refill Note    Requested Prescriptions     Pending Prescriptions Disp Refills   • valsartan (DIOVAN) 80 MG tablet [Pharmacy Med Name: VALSARTAN 80 MG TABLET] 30 tablet 0     Sig: TAKE 1 TABLET BY MOUTH EVERY DAY        Last office visit with prescribing clinician: Visit date not found      Next office visit with prescribing clinician: 5/4/2023   Last labs:   Last refill: 11/03/2022   Pharmacy (be sure to add in Epic). correct

## 2023-05-04 ENCOUNTER — TELEPHONE (OUTPATIENT)
Dept: FAMILY MEDICINE CLINIC | Facility: CLINIC | Age: 63
End: 2023-05-04

## 2023-05-04 NOTE — TELEPHONE ENCOUNTER
Caller: Daja Hubbard    Relationship to patient: Self    Best call back number: [phone]    If rescheduling, when is the original appointment: 5.4.23    Additional notes: THE PATIENT WOULD LIKE TO KNOW IF SHE WILL NEED TO HAVE BLOOD WORK COMPLETED, AND IF SO, SHE WOULD LIKE TO COMPLETE IT PRIOR TO SEEING DR. ARENAS.     PLEASE CALL BACK TO ADVISE AND SCHEDULE,  IF NEEDED.     THANK YOU.

## 2023-05-24 NOTE — TELEPHONE ENCOUNTER
Rx Refill Note    Requested Prescriptions     Pending Prescriptions Disp Refills   • valsartan (DIOVAN) 80 MG tablet [Pharmacy Med Name: VALSARTAN 80 MG TABLET] 30 tablet 0     Sig: TAKE 1 TABLET BY MOUTH EVERY DAY        Last office visit with prescribing clinician: 04/19/2023 by Fernanda      Next office visit with prescribing clinician: Visit date not found   Last labs:   Last refill: 05/01/2023   Pharmacy (be sure to add in Epic). correct

## 2023-05-25 RX ORDER — VALSARTAN 80 MG/1
TABLET ORAL
Qty: 30 TABLET | Refills: 0 | Status: SHIPPED | OUTPATIENT
Start: 2023-05-25

## 2023-06-22 PROBLEM — E83.52 HYPERCALCEMIA: Status: ACTIVE | Noted: 2023-06-22

## 2023-06-22 PROBLEM — F43.22 ADJUSTMENT DISORDER WITH ANXIOUS MOOD: Status: ACTIVE | Noted: 2023-06-22

## 2023-06-22 PROBLEM — R61 NIGHT SWEATS: Status: ACTIVE | Noted: 2023-06-22

## 2023-06-22 PROBLEM — N18.31 CHRONIC KIDNEY DISEASE, STAGE 3A: Status: ACTIVE | Noted: 2023-06-22

## 2023-06-27 ENCOUNTER — TELEPHONE (OUTPATIENT)
Dept: FAMILY MEDICINE CLINIC | Facility: CLINIC | Age: 63
End: 2023-06-27

## 2023-06-27 NOTE — TELEPHONE ENCOUNTER
Caller: Daja Hubbard    Relationship to patient: Self    Best call back number: [phone]     Patient is needing: PATIENT WAS SEEN ON 6.22.23 AND SHE WAS TOLD BY MD ARENAS TO SCHEDULE A 2 WEEK FOLLOW UP APPOINTMENT. PATIENT IS AVAILABLE IN THE AFTERNOON ON 7.6.23.     THE NEXT AVAILABLE APPOINTMENT WITH MD ARENAS IS 8.2.23. PLEASE CALL PATIENT BACK WITH A SOONER APPOINTMENT IF POSSIBLE. IF NO ANSWER LEAVE A MESSAGE.

## 2023-06-27 NOTE — TELEPHONE ENCOUNTER
Caller: Haydee Daja L    Relationship: Self    Best call back number: [phone]     What test was performed:LAB WORK     When was the test performed: 6.22.23    Where was the test performed: [facility]     Additional notes: PLEASE CALL PATIENT BACK WITH THE RESULTS, IF NO ANSWER LEAVE A DETAILED MESSAGE.

## 2023-07-19 PROBLEM — E55.9 VITAMIN D DEFICIENCY: Status: ACTIVE | Noted: 2023-07-19

## 2023-07-19 PROBLEM — E53.8 VITAMIN B12 DEFICIENCY: Status: ACTIVE | Noted: 2023-07-19

## 2023-07-19 PROBLEM — N18.32 CHRONIC KIDNEY DISEASE, STAGE 3B: Status: ACTIVE | Noted: 2023-07-19

## 2023-07-19 PROBLEM — F41.9 ANXIETY: Status: ACTIVE | Noted: 2023-07-19

## 2023-08-07 ENCOUNTER — TELEPHONE (OUTPATIENT)
Dept: FAMILY MEDICINE CLINIC | Facility: CLINIC | Age: 63
End: 2023-08-07
Payer: COMMERCIAL

## 2023-08-07 NOTE — TELEPHONE ENCOUNTER
Caller: Daja Hubbard    Relationship: Self    Best call back number: [phone]    What medications are you currently taking:   Current Outpatient Medications on File Prior to Visit   Medication Sig Dispense Refill    amLODIPine (NORVASC) 5 MG tablet Take 1 tablet by mouth Daily. 90 tablet 1    Biotin 1 MG capsule Take  by mouth.      cholecalciferol (VITAMIN D3) 25 MCG (1000 UT) tablet Take 2 tablets by mouth Daily.      estradiol (ESTRACE) 0.1 MG/GM vaginal cream INSERT 0.5 G INTO THE VAGINA 3 (THREE) TIMES A WEEK FOR 30 DAYS.      hydrOXYzine (ATARAX) 50 MG tablet Take 1 tablet by mouth Every 8 (Eight) Hours As Needed for Anxiety. 90 tablet 11    meloxicam (MOBIC) 15 MG tablet TAKE 1 TABLET BY MOUTH EVERY DAY 30 tablet 0    multivitamin with minerals tablet tablet Take 1 tablet by mouth Daily.      valsartan (DIOVAN) 80 MG tablet Take 1 tablet by mouth Daily. 90 tablet 0     No current facility-administered medications on file prior to visit.        What are your concerns: PT REQUEST 25 MG HYDROXYZINE INSTEAD OF 50MG BECAUSE SHE IS HAVING TO BREAK TABLETS IN HALF

## 2023-08-08 RX ORDER — HYDROXYZINE HYDROCHLORIDE 25 MG/1
25 TABLET, FILM COATED ORAL AS NEEDED
COMMUNITY
End: 2023-08-08 | Stop reason: SDUPTHER

## 2023-08-08 RX ORDER — HYDROXYZINE HYDROCHLORIDE 25 MG/1
25 TABLET, FILM COATED ORAL AS NEEDED
Qty: 90 TABLET | Refills: 0 | Status: SHIPPED | OUTPATIENT
Start: 2023-08-08

## 2023-08-16 RX ORDER — MELOXICAM 15 MG/1
TABLET ORAL
Qty: 30 TABLET | Refills: 0 | Status: SHIPPED | OUTPATIENT
Start: 2023-08-16

## 2023-08-16 NOTE — TELEPHONE ENCOUNTER
Rx Refill Note    Requested Prescriptions     Pending Prescriptions Disp Refills    meloxicam (MOBIC) 15 MG tablet [Pharmacy Med Name: MELOXICAM 15 MG TABLET] 30 tablet 0     Sig: TAKE 1 TABLET BY MOUTH EVERY DAY        Last office visit with prescribing clinician: 7/19/2023      Next office visit with prescribing clinician: 10/23/2023   Last labs:   Last refill: 07/20/2023   Pharmacy (be sure to add in Epic). correct

## 2023-09-11 RX ORDER — MELOXICAM 15 MG/1
TABLET ORAL
Qty: 30 TABLET | Refills: 0 | Status: SHIPPED | OUTPATIENT
Start: 2023-09-11

## 2023-09-11 RX ORDER — HYDROXYZINE HYDROCHLORIDE 25 MG/1
25 TABLET, FILM COATED ORAL AS NEEDED
Qty: 90 TABLET | Refills: 0 | Status: SHIPPED | OUTPATIENT
Start: 2023-09-11

## 2023-09-11 NOTE — TELEPHONE ENCOUNTER
Rx Refill Note    Requested Prescriptions     Pending Prescriptions Disp Refills    meloxicam (MOBIC) 15 MG tablet [Pharmacy Med Name: MELOXICAM 15 MG TABLET] 30 tablet 0     Sig: TAKE 1 TABLET BY MOUTH EVERY DAY    hydrOXYzine (ATARAX) 25 MG tablet [Pharmacy Med Name: HYDROXYZINE HCL 25 MG TABLET] 90 tablet 0     Sig: TAKE 1 TABLET BY MOUTH AS NEEDED FOR ITCHING.        Last office visit with prescribing clinician: 7/19/2023      Next office visit with prescribing clinician: 10/23/2023   Last labs:   Last refill: should just need meloxicam   Pharmacy (be sure to add in Epic). Correct

## 2023-09-19 ENCOUNTER — TELEPHONE (OUTPATIENT)
Dept: FAMILY MEDICINE CLINIC | Facility: CLINIC | Age: 63
End: 2023-09-19
Payer: COMMERCIAL

## 2023-09-19 NOTE — TELEPHONE ENCOUNTER
HUB TO READ    PLEASE LET PATIENT KNOW THAT HER APPOINTMENT WITH  ON 10/23 IS NEEDING TO BE RESCHEDULED DUE TO HIM BEING OUT OF THE OFFICE ALL WEEK.

## 2023-09-28 ENCOUNTER — OFFICE VISIT (OUTPATIENT)
Dept: FAMILY MEDICINE CLINIC | Facility: CLINIC | Age: 63
End: 2023-09-28
Payer: COMMERCIAL

## 2023-09-28 VITALS
RESPIRATION RATE: 16 BRPM | OXYGEN SATURATION: 100 % | DIASTOLIC BLOOD PRESSURE: 80 MMHG | HEART RATE: 76 BPM | BODY MASS INDEX: 33.12 KG/M2 | SYSTOLIC BLOOD PRESSURE: 122 MMHG | HEIGHT: 64 IN | WEIGHT: 194 LBS

## 2023-09-28 DIAGNOSIS — R19.7 DIARRHEA, UNSPECIFIED TYPE: Primary | ICD-10-CM

## 2023-09-28 DIAGNOSIS — I10 ESSENTIAL HYPERTENSION: ICD-10-CM

## 2023-09-28 DIAGNOSIS — E55.9 VITAMIN D DEFICIENCY: ICD-10-CM

## 2023-09-28 DIAGNOSIS — N18.31 CHRONIC KIDNEY DISEASE, STAGE 3A: ICD-10-CM

## 2023-09-28 DIAGNOSIS — F41.9 ANXIETY: ICD-10-CM

## 2023-09-28 DIAGNOSIS — E83.52 HYPERCALCEMIA: ICD-10-CM

## 2023-09-28 DIAGNOSIS — E53.8 VITAMIN B12 DEFICIENCY: ICD-10-CM

## 2023-09-28 NOTE — ASSESSMENT & PLAN NOTE
Patient has had diarrhea for the last 6 weeks.  She does not remember eating anything abnormal or any other symptoms associated with it.  She has not had any blood in her stool.    I am going to get a celiac panel today, she is going to avoid lactose for 2 weeks, then avoid gluten for 2 weeks.  I am also going get stool studies.    She is can return in 6 weeks and if she is not better we are going to send her for a colonoscopy to rule out microscopic colitis or other causes of chronic diarrhea.

## 2023-09-28 NOTE — PROGRESS NOTES
Patient Name: Daja Hubbard  : 1960   MRN: [id number]     Chief Complaint:  recheck on diarrhea  Chief Complaint   Patient presents with    Diarrhea     Onset aug 20th       History of Present Illness: Daja Hubbard is a 63 y.o. female who is here today for follow up.  Diarrhea           Review of Systems:   Review of Systems   Constitutional: Negative.    HENT: Negative.     Eyes: Negative.    Respiratory: Negative.     Cardiovascular: Negative.    Gastrointestinal:  Positive for diarrhea.   Neurological: Negative.       Past Medical History:   Past Medical History:   Diagnosis Date    Anemia     Cervical high risk human papillomavirus (HPV) DNA test positive 2015    NON 16/18 POSITIVE    Cervical stricture or stenosis     cervical stricture and stenosis    Depression     Diverticulitis     Fibromyalgia     History of kidney stones     Hypertension     Kidney stone     Low grade squamous intraepithelial lesion (LGSIL) on cervical Pap smear 2020    colposcopy 2020 with BX at 6 and ECC; unsatisfactory; thick WE extending inside OS    Mild dysplasia of cervix     (DEV I)    Mild dysplasia of cervix 2020    ECC positive LGSIL; colposcopy unsatisfactory with WE extending inside os.    Mild dysplasia of cervix (DEV I) 2020    Recurrent UTI     Tobacco dependence     Urge incontinence     has a bladder stimulator    Varicella        Past Surgical History:   Past Surgical History:   Procedure Laterality Date    APPENDECTOMY      BLADDER SURGERY      implant for bladder     SECTION      COLON RESECTION N/A 2020    Procedure: LAPROSCOPIC LOWER ANTERIOR RESECTION, LOOP ILEOSTOMY CREATION, MOBOLIZATION OF SPLENIC FLEXURE, APPENDECTOMY;  Surgeon: Kojo Barney MD;  Location: [facility];  Service: General;  Laterality: N/A;    COLONOSCOPY      COLPOSCOPY  2015    CYSTOSCOPY W/ URETERAL STENT PLACEMENT  2020    Procedure: CYSTOSCOPY URETERAL CATHETER  INSERTION;  Surgeon: Kory Chang Jr., MD;  Location: [facility];  Service: Urology;;    HERNIA REPAIR      INCISION AND DRAINAGE HEMATOMA Right     inner thigh    KIDNEY STONE SURGERY      LEEP  2020    OSTOMY TAKE DOWN      3/2021       Family History:   Family History   Problem Relation Age of Onset    Colon cancer Other     Osteoporosis Other     Alzheimer's disease Other     Breast cancer Neg Hx        Social History:   Social History     Socioeconomic History    Marital status:    Tobacco Use    Smoking status: Former     Packs/day: 1.00     Years: 20.00     Pack years: 20.00     Types: Cigarettes     Start date:      Quit date: 2018     Years since quittin.7     Passive exposure: Past    Smokeless tobacco: Never   Vaping Use    Vaping Use: Some days    Substances: Nicotine, Flavoring    Devices: Disposable, Pre-filled pod    Passive vaping exposure: Yes   Substance and Sexual Activity    Alcohol use: Not Currently     Comment: Per Ciera, occasional/no abuse    Drug use: Never    Sexual activity: Not Currently     Partners: Male     Birth control/protection: Post-menopausal       Medications:     Current Outpatient Medications:     amLODIPine (NORVASC) 5 MG tablet, Take 1 tablet by mouth Daily., Disp: 90 tablet, Rfl: 1    Biotin 1 MG capsule, Take  by mouth., Disp: , Rfl:     cholecalciferol (VITAMIN D3) 25 MCG (1000 UT) tablet, Take 2 tablets by mouth Daily., Disp: , Rfl:     estradiol (ESTRACE) 0.1 MG/GM vaginal cream, INSERT 0.5 G INTO THE VAGINA 3 (THREE) TIMES A WEEK FOR 30 DAYS., Disp: , Rfl:     hydrOXYzine (ATARAX) 25 MG tablet, TAKE 1 TABLET BY MOUTH AS NEEDED FOR ITCHING. (Patient taking differently: Take 1 tablet by mouth As Needed for Itching. Pt take up to 6 times a day), Disp: 90 tablet, Rfl: 0    meloxicam (MOBIC) 15 MG tablet, TAKE 1 TABLET BY MOUTH EVERY DAY, Disp: 30 tablet, Rfl: 0    multivitamin with minerals tablet tablet, Take 1 tablet by mouth  "Daily., Disp: , Rfl:     valsartan (DIOVAN) 80 MG tablet, Take 1 tablet by mouth Daily., Disp: 90 tablet, Rfl: 0    Allergies:   Allergies   Allergen Reactions    Sulfa Antibiotics Hives     Itching, swelling         Physical Exam:  Vital Signs:   Vitals:    09/28/23 1437   BP: 122/80   BP Location: Left arm   Patient Position: Sitting   Cuff Size: Adult   Pulse: 76   Resp: 16   SpO2: 100%   Weight: 88 kg (194 lb)   Height: 162.6 cm (64.02\")     Body mass index is 33.28 kg/m².     Physical Exam  Vitals and nursing note reviewed.   Constitutional:       Appearance: Normal appearance. She is normal weight.   HENT:      Head: Normocephalic and atraumatic.      Right Ear: Tympanic membrane, ear canal and external ear normal.      Left Ear: Tympanic membrane, ear canal and external ear normal.      Nose: Nose normal.      Mouth/Throat:      Mouth: Mucous membranes are dry.      Pharynx: Oropharynx is clear.   Eyes:      Extraocular Movements: Extraocular movements intact.      Conjunctiva/sclera: Conjunctivae normal.      Pupils: Pupils are equal, round, and reactive to light.   Cardiovascular:      Rate and Rhythm: Normal rate and regular rhythm.      Pulses: Normal pulses.      Heart sounds: Normal heart sounds.   Pulmonary:      Effort: Pulmonary effort is normal.      Breath sounds: Normal breath sounds.   Musculoskeletal:      Cervical back: Normal range of motion and neck supple.   Feet:      Comments:      Neurological:      Mental Status: She is alert.       Procedures      Assessment/Plan:   Diagnoses and all orders for this visit:    1. Diarrhea, unspecified type (Primary)  Assessment & Plan:  Patient has had diarrhea for the last 6 weeks.  She does not remember eating anything abnormal or any other symptoms associated with it.  She has not had any blood in her stool.    I am going to get a celiac panel today, she is going to avoid lactose for 2 weeks, then avoid gluten for 2 weeks.  I am also going get stool " studies.    She is can return in 6 weeks and if she is not better we are going to send her for a colonoscopy to rule out microscopic colitis or other causes of chronic diarrhea.    Orders:  -     Celiac Disease Panel; Future             Follow Up:   No follow-ups on file.    Sp Marie MD  [facility]

## 2023-09-29 LAB
25(OH)D3+25(OH)D2 SERPL-MCNC: 28.6 NG/ML (ref 30–100)
ALBUMIN SERPL-MCNC: 4.2 G/DL (ref 3.9–4.9)
ALBUMIN/GLOB SERPL: 2 {RATIO} (ref 1.2–2.2)
ALP SERPL-CCNC: 90 IU/L (ref 44–121)
ALT SERPL-CCNC: 8 IU/L (ref 0–32)
AST SERPL-CCNC: 14 IU/L (ref 0–40)
BASOPHILS # BLD AUTO: 0 X10E3/UL (ref 0–0.2)
BASOPHILS NFR BLD AUTO: 0 %
BILIRUB SERPL-MCNC: 0.2 MG/DL (ref 0–1.2)
BUN SERPL-MCNC: 18 MG/DL (ref 8–27)
BUN/CREAT SERPL: 12 (ref 12–28)
CALCIUM SERPL-MCNC: 9.5 MG/DL (ref 8.7–10.3)
CHLORIDE SERPL-SCNC: 104 MMOL/L (ref 96–106)
CHOLEST SERPL-MCNC: 203 MG/DL (ref 100–199)
CO2 SERPL-SCNC: 21 MMOL/L (ref 20–29)
CREAT SERPL-MCNC: 1.45 MG/DL (ref 0.57–1)
EGFRCR SERPLBLD CKD-EPI 2021: 41 ML/MIN/1.73
ENDOMYSIUM IGA SER QL: NEGATIVE
EOSINOPHIL # BLD AUTO: 0 X10E3/UL (ref 0–0.4)
EOSINOPHIL NFR BLD AUTO: 0 %
ERYTHROCYTE [DISTWIDTH] IN BLOOD BY AUTOMATED COUNT: 12.5 % (ref 11.7–15.4)
GLOBULIN SER CALC-MCNC: 2.1 G/DL (ref 1.5–4.5)
GLUCOSE SERPL-MCNC: 101 MG/DL (ref 70–99)
HBA1C MFR BLD: 5.5 % (ref 4.8–5.6)
HCT VFR BLD AUTO: 32.9 % (ref 34–46.6)
HDLC SERPL-MCNC: 44 MG/DL
HGB BLD-MCNC: 10.8 G/DL (ref 11.1–15.9)
IGA SERPL-MCNC: 249 MG/DL (ref 87–352)
IMM GRANULOCYTES # BLD AUTO: 0 X10E3/UL (ref 0–0.1)
IMM GRANULOCYTES NFR BLD AUTO: 0 %
LDLC SERPL CALC-MCNC: 132 MG/DL (ref 0–99)
LYMPHOCYTES # BLD AUTO: 2.5 X10E3/UL (ref 0.7–3.1)
LYMPHOCYTES NFR BLD AUTO: 51 %
MCH RBC QN AUTO: 28.4 PG (ref 26.6–33)
MCHC RBC AUTO-ENTMCNC: 32.8 G/DL (ref 31.5–35.7)
MCV RBC AUTO: 87 FL (ref 79–97)
MONOCYTES # BLD AUTO: 0.6 X10E3/UL (ref 0.1–0.9)
MONOCYTES NFR BLD AUTO: 12 %
NEUTROPHILS # BLD AUTO: 1.8 X10E3/UL (ref 1.4–7)
NEUTROPHILS NFR BLD AUTO: 37 %
PLATELET # BLD AUTO: 405 X10E3/UL (ref 150–450)
POTASSIUM SERPL-SCNC: 4.3 MMOL/L (ref 3.5–5.2)
PROT SERPL-MCNC: 6.3 G/DL (ref 6–8.5)
RBC # BLD AUTO: 3.8 X10E6/UL (ref 3.77–5.28)
SODIUM SERPL-SCNC: 139 MMOL/L (ref 134–144)
TRIGL SERPL-MCNC: 151 MG/DL (ref 0–149)
TSH SERPL DL<=0.005 MIU/L-ACNC: 3.99 UIU/ML (ref 0.45–4.5)
TTG IGA SER-ACNC: <2 U/ML (ref 0–3)
VIT B12 SERPL-MCNC: 727 PG/ML (ref 232–1245)
VLDLC SERPL CALC-MCNC: 27 MG/DL (ref 5–40)
WBC # BLD AUTO: 4.9 X10E3/UL (ref 3.4–10.8)

## 2023-09-29 NOTE — TELEPHONE ENCOUNTER
Caller: Daja Hubbard    Relationship: Self    Best call back number: [phone]     Requested Prescriptions:   Requested Prescriptions     Pending Prescriptions Disp Refills    hydrOXYzine (ATARAX) 25 MG tablet 90 tablet 0     Sig: Take 1 tablet by mouth As Needed for Itching.        Pharmacy where request should be sent: [facility] #[id number] - [place] [address] [address] - [phone]  - [phone] FX     Last office visit with prescribing clinician: 9/28/2023   Last telemedicine visit with prescribing clinician: Visit date not found   Next office visit with prescribing clinician: 10/23/2023     Additional details provided by patient: THE PATIENT WOULD ALSO LIKE TO GET HER LAB AND STOOL SAMPLE RESULTS     Does the patient have less than a 3 day supply:  [] Yes  [x] No    Would you like a call back once the refill request has been completed: [x] Yes [] No    If the office needs to give you a call back, can they leave a voicemail: [x] Yes [] No    Ivanna Fuentes Rep   09/29/23 13:17 EDT

## 2023-10-02 ENCOUNTER — TELEPHONE (OUTPATIENT)
Dept: FAMILY MEDICINE CLINIC | Facility: CLINIC | Age: 63
End: 2023-10-02
Payer: COMMERCIAL

## 2023-10-02 DIAGNOSIS — D64.9 ANEMIA, UNSPECIFIED TYPE: Primary | ICD-10-CM

## 2023-10-02 RX ORDER — HYDROXYZINE HYDROCHLORIDE 25 MG/1
25 TABLET, FILM COATED ORAL AS NEEDED
Qty: 90 TABLET | Refills: 0 | Status: SHIPPED | OUTPATIENT
Start: 2023-10-02 | End: 2023-10-03 | Stop reason: SDUPTHER

## 2023-10-03 ENCOUNTER — TELEPHONE (OUTPATIENT)
Dept: FAMILY MEDICINE CLINIC | Facility: CLINIC | Age: 63
End: 2023-10-03
Payer: COMMERCIAL

## 2023-10-03 DIAGNOSIS — F41.9 ANXIETY: Primary | ICD-10-CM

## 2023-10-03 RX ORDER — HYDROXYZINE HYDROCHLORIDE 25 MG/1
25 TABLET, FILM COATED ORAL EVERY 4 HOURS PRN
Qty: 640 TABLET | Refills: 3 | Status: SHIPPED | OUTPATIENT
Start: 2023-10-03

## 2023-10-03 NOTE — TELEPHONE ENCOUNTER
PATIENT HAS CALLED REQUESTING A CALL BACK WITH RESULTS OF HER LAB WORK AND STOOL SAMPLE.    CALL BACK NUMBER [phone]

## 2023-10-03 NOTE — TELEPHONE ENCOUNTER
Caller: Daja Hubbard    Relationship: Self    Best call back number: [phone]    What medications are you currently taking:   Current Outpatient Medications on File Prior to Visit   Medication Sig Dispense Refill    amLODIPine (NORVASC) 5 MG tablet Take 1 tablet by mouth Daily. 90 tablet 1    Biotin 1 MG capsule Take  by mouth.      cholecalciferol (VITAMIN D3) 25 MCG (1000 UT) tablet Take 2 tablets by mouth Daily.      estradiol (ESTRACE) 0.1 MG/GM vaginal cream INSERT 0.5 G INTO THE VAGINA 3 (THREE) TIMES A WEEK FOR 30 DAYS.      hydrOXYzine (ATARAX) 25 MG tablet Take 1 tablet by mouth As Needed for Itching. 90 tablet 0    meloxicam (MOBIC) 15 MG tablet TAKE 1 TABLET BY MOUTH EVERY DAY 30 tablet 0    multivitamin with minerals tablet tablet Take 1 tablet by mouth Daily.      valsartan (DIOVAN) 80 MG tablet Take 1 tablet by mouth Daily. 90 tablet 0     No current facility-administered medications on file prior to visit.          Which medication are you concerned about: HYDROXIZINE 25MG     Who prescribed you this medication: DR ARENAS    What are your concerns: PATIENT STATES HER MEDICATION IS SUPPOSE TO BE 6 TIMES DAILY. BECAUSE NO NEW PRESCRIPTION WAS SENT IN PHARMACY STATES SHE CAN NOT FILL UNTIL 10/15/2023. PATIENT IS OUT OF MEDICATION WITH THE NEW DOSAGE. PLEASE CALL PATIENT ASAP TO GET THIS RECTIFIED.

## 2023-12-26 RX ORDER — AMLODIPINE BESYLATE 5 MG/1
5 TABLET ORAL DAILY
Qty: 30 TABLET | Refills: 0 | Status: SHIPPED | OUTPATIENT
Start: 2023-12-26

## 2023-12-26 RX ORDER — VALSARTAN 80 MG/1
80 TABLET ORAL DAILY
Qty: 90 TABLET | Refills: 0 | Status: SHIPPED | OUTPATIENT
Start: 2023-12-26

## 2024-01-05 RX ORDER — AMLODIPINE BESYLATE 5 MG/1
5 TABLET ORAL DAILY
Qty: 90 TABLET | Refills: 0 | Status: SHIPPED | OUTPATIENT
Start: 2024-01-05

## 2024-01-05 NOTE — TELEPHONE ENCOUNTER
Caller: Daja Hubbard    Relationship: Self    Best call back number:  [phone]     Requested Prescriptions:   Requested Prescriptions     Pending Prescriptions Disp Refills    amLODIPine (NORVASC) 5 MG tablet 30 tablet 0     Sig: Take 1 tablet by mouth Daily.        Pharmacy where request should be sent: [facility] #[id number] - [facility] [address] - [phone]  - [phone] FX     Last office visit with prescribing clinician: 9/28/2023   Last telemedicine visit with prescribing clinician: Visit date not found   Next office visit with prescribing clinician: Visit date not found     Additional details provided by patient: A SCRIPT WAS CALLED IN 12/26/23 BUT THE PATIENT WANTS A 90 DAY SUPPLY CALLED IN DUE TO THE COST OF HER MEDICATION MONTHLY  Does the patient have less than a 3 day supply:  [] Yes  [x] No    Would you like a call back once the refill request has been completed: [] Yes [x] No    If the office needs to give you a call back, can they leave a voicemail: [] Yes [x] No    Ivanna Fuentes Rep   01/05/24 12:33 EST

## 2024-02-09 ENCOUNTER — OFFICE VISIT (OUTPATIENT)
Dept: FAMILY MEDICINE CLINIC | Facility: CLINIC | Age: 64
End: 2024-02-09
Payer: COMMERCIAL

## 2024-02-09 VITALS
RESPIRATION RATE: 16 BRPM | BODY MASS INDEX: 36.18 KG/M2 | HEIGHT: 64 IN | DIASTOLIC BLOOD PRESSURE: 82 MMHG | WEIGHT: 211.9 LBS | HEART RATE: 78 BPM | OXYGEN SATURATION: 98 % | SYSTOLIC BLOOD PRESSURE: 128 MMHG

## 2024-02-09 DIAGNOSIS — E66.01 MORBID (SEVERE) OBESITY DUE TO EXCESS CALORIES: ICD-10-CM

## 2024-02-09 DIAGNOSIS — R06.02 SHORTNESS OF BREATH: ICD-10-CM

## 2024-02-09 DIAGNOSIS — N18.31 CHRONIC KIDNEY DISEASE, STAGE 3A: ICD-10-CM

## 2024-02-09 DIAGNOSIS — E55.9 VITAMIN D DEFICIENCY: ICD-10-CM

## 2024-02-09 DIAGNOSIS — I10 ESSENTIAL HYPERTENSION: ICD-10-CM

## 2024-02-09 DIAGNOSIS — Z00.00 PHYSICAL EXAM: Primary | ICD-10-CM

## 2024-02-09 DIAGNOSIS — D64.9 ANEMIA, UNSPECIFIED TYPE: ICD-10-CM

## 2024-02-09 DIAGNOSIS — E53.8 VITAMIN B12 DEFICIENCY: ICD-10-CM

## 2024-02-09 DIAGNOSIS — L30.9 DERMATITIS: ICD-10-CM

## 2024-02-09 NOTE — ASSESSMENT & PLAN NOTE
Patient has had a dermatitis on her chest for 3 months.  This looks like a skin irritation.  I am going to start her on some Lidex cream.  Apply twice a day for 3 weeks.  If it is still there in 3 weeks we will send her to dermatology.

## 2024-02-09 NOTE — PROGRESS NOTES
Patient Name: Daja Hubbard  : 1960   MRN: [id number]     Chief Complaint:    Chief Complaint   Patient presents with    Rash On Chest      Onset two months    Annual Exam       History of Present Illness: Daja Hubbard is a 63 y.o. female who is here today for their annual health maintenance and physical.          Review of Systems:   Review of Systems   Constitutional:  Positive for fatigue. Negative for fever.   HENT:  Negative for congestion, rhinorrhea and sore throat.    Respiratory:  Positive for shortness of breath. Negative for cough.    Gastrointestinal:  Negative for diarrhea and vomiting.   Skin:  Positive for rash.       Past Medical History, Social History, Family History and Care Team were all reviewed with patient and updated as appropriate.     Medications:     Current Outpatient Medications:     amLODIPine (NORVASC) 5 MG tablet, Take 1 tablet by mouth Daily., Disp: 90 tablet, Rfl: 0    Biotin 1 MG capsule, Take  by mouth., Disp: , Rfl:     cholecalciferol (VITAMIN D3) 25 MCG (1000 UT) tablet, Take 2 tablets by mouth Daily., Disp: , Rfl:     estradiol (ESTRACE) 0.1 MG/GM vaginal cream, INSERT 0.5 G INTO THE VAGINA 3 (THREE) TIMES A WEEK FOR 30 DAYS., Disp: , Rfl:     fluocinonide (LIDEX) 0.05 % cream, Apply 1 Application topically to the appropriate area as directed 2 (Two) Times a Day. Apply to affected area twice a day, Disp: 60 g, Rfl: 1    hydrOXYzine (ATARAX) 25 MG tablet, Take 1 tablet by mouth Every 4 (Four) Hours As Needed for Itching., Disp: 640 tablet, Rfl: 3    meloxicam (MOBIC) 15 MG tablet, TAKE 1 TABLET BY MOUTH EVERY DAY, Disp: 30 tablet, Rfl: 0    multivitamin with minerals tablet tablet, Take 1 tablet by mouth Daily., Disp: , Rfl:     valsartan (DIOVAN) 80 MG tablet, TAKE 1 TABLET BY MOUTH EVERY DAY, Disp: 90 tablet, Rfl: 0    Allergies:   Allergies   Allergen Reactions    Sulfa Antibiotics Hives and Unknown - Low Severity     Itching, swelling       Health  "Maintenance   Topic Date Due    BMI FOLLOWUP  Never done    ZOSTER VACCINE (1 of 2) Never done    LUNG CANCER SCREENING  Never done    HEPATITIS C SCREENING  Never done    RSV Vaccine - Adults (1 - 1-dose 60+ series) Never done    INFLUENZA VACCINE  08/01/2023    COVID-19 Vaccine (4 - 2023-24 season) 09/01/2023    ANNUAL PHYSICAL  02/09/2025    MAMMOGRAM  03/08/2025    PAP SMEAR  02/23/2026    TDAP/TD VACCINES (2 - Tdap) 08/30/2028    COLORECTAL CANCER SCREENING  10/11/2033    Pneumococcal Vaccine 0-64  Aged Out        PHQ-2 Total Score: 0       Intimate partner violence: (Screen on initial visit, pregnant women, women with injuries, older adult with injury or evidence of neglect):  Violence can be a problem in many people's lives, so I now ask every patient about trauma or abuse they may have experienced in a relationship.  Stress/Safety - Do you feel safe in your relationship?  Afraid/Abused - Have you ever been in a relationship where you were threatened, hurt, or afraid?  Friend/Family - Are your friends aware you have been hurt?  Emergency Plan - Do you have a safe place to go and the resources you need in an emergency?    Osteoporosis:   Ost menopausal women < 65 with RF (advancing age, previous fracture, glucocorticoid therapy, parental hip fracture, low body weight, current cigarette smoking, excessive alcohol consumption, rheumatoid arthritis, secondary osteoporosis [hypogonadism/premature menopause, malabsorption, chronic liver disease, IBD]).  All women 65 or older      Physical Exam:  Vital Signs:   Vitals:    02/09/24 0917   BP: 128/82   BP Location: Left arm   Patient Position: Sitting   Cuff Size: Adult   Pulse: 78   Resp: 16   SpO2: 98%   Weight: 96.1 kg (211 lb 14.4 oz)   Height: 162.9 cm (64.13\")   PainSc:   2   PainLoc: Chest     Body mass index is 36.22 kg/m².     Physical Exam  Vitals and nursing note reviewed.   Constitutional:       Appearance: Normal appearance. She is normal weight.   HENT: "      Head: Normocephalic and atraumatic.      Right Ear: Tympanic membrane, ear canal and external ear normal.      Left Ear: Tympanic membrane, ear canal and external ear normal.      Nose: Nose normal.      Mouth/Throat:      Mouth: Mucous membranes are moist.      Pharynx: Oropharynx is clear.   Eyes:      Extraocular Movements: Extraocular movements intact.      Conjunctiva/sclera: Conjunctivae normal.      Pupils: Pupils are equal, round, and reactive to light.   Cardiovascular:      Rate and Rhythm: Normal rate and regular rhythm.      Pulses: Normal pulses.      Heart sounds: Normal heart sounds.   Pulmonary:      Effort: Pulmonary effort is normal.      Breath sounds: Normal breath sounds.   Abdominal:      General: Abdomen is flat. Bowel sounds are normal.      Palpations: Abdomen is soft.   Musculoskeletal:         General: Normal range of motion.      Cervical back: Normal range of motion and neck supple.   Feet:      Comments:      Skin:     General: Skin is warm and dry.   Neurological:      General: No focal deficit present.      Mental Status: She is alert and oriented to person, place, and time.   Psychiatric:         Mood and Affect: Mood normal.         Behavior: Behavior normal.         Thought Content: Thought content normal.         Judgment: Judgment normal.         Procedures      Assessment/Plan:   Diagnoses and all orders for this visit:    1. Physical exam (Primary)  -     CBC Auto Differential; Future  -     CK; Future  -     Comprehensive Metabolic Panel; Future  -     Hemoglobin A1c; Future  -     Lipid Panel; Future  -     TSH; Future  -     Vitamin D,25-Hydroxy; Future  -     Hepatitis C Antibody; Future  -     Ferritin; Future  -     Iron Profile; Future  -     Vitamin B12; Future    2. Anemia, unspecified type  Assessment & Plan:  Blood work    Orders:  -     CBC Auto Differential; Future  -     CK; Future  -     Comprehensive Metabolic Panel; Future  -     Hemoglobin A1c; Future  -      Lipid Panel; Future  -     TSH; Future  -     Vitamin D,25-Hydroxy; Future  -     Hepatitis C Antibody; Future  -     Ferritin; Future  -     Iron Profile; Future  -     Vitamin B12; Future    3. Essential hypertension  Assessment & Plan:  Discussed with patient to monitor their blood pressure and if systolic blood pressure goes above 140 or diastolic is above 90 to return to clinic.  Take medicines as directed, call for any problems, patient not having or any worrisome symptoms.        Orders:  -     CBC Auto Differential; Future  -     CK; Future  -     Comprehensive Metabolic Panel; Future  -     Hemoglobin A1c; Future  -     Lipid Panel; Future  -     TSH; Future  -     Vitamin D,25-Hydroxy; Future  -     Hepatitis C Antibody; Future  -     Ferritin; Future  -     Iron Profile; Future  -     Vitamin B12; Future    4. Vitamin B12 deficiency  Assessment & Plan:  Blood work    Orders:  -     CBC Auto Differential; Future  -     CK; Future  -     Comprehensive Metabolic Panel; Future  -     Hemoglobin A1c; Future  -     Lipid Panel; Future  -     TSH; Future  -     Vitamin D,25-Hydroxy; Future  -     Hepatitis C Antibody; Future  -     Ferritin; Future  -     Iron Profile; Future  -     Vitamin B12; Future    5. Vitamin D deficiency  Assessment & Plan:  Blood work    Orders:  -     CBC Auto Differential; Future  -     CK; Future  -     Comprehensive Metabolic Panel; Future  -     Hemoglobin A1c; Future  -     Lipid Panel; Future  -     TSH; Future  -     Vitamin D,25-Hydroxy; Future  -     Hepatitis C Antibody; Future  -     Ferritin; Future  -     Iron Profile; Future  -     Vitamin B12; Future    6. Chronic kidney disease, stage 3a  Assessment & Plan:  Patient is instructed to not take any NSAIDs.  Medicines as directed.  Stay well-hydrated.      Orders:  -     CBC Auto Differential; Future  -     CK; Future  -     Comprehensive Metabolic Panel; Future  -     Hemoglobin A1c; Future  -     Lipid Panel; Future  -      TSH; Future  -     Vitamin D,25-Hydroxy; Future  -     Hepatitis C Antibody; Future  -     Ferritin; Future  -     Iron Profile; Future  -     Vitamin B12; Future    7. Dermatitis  Assessment & Plan:  Patient has had a dermatitis on her chest for 3 months.  This looks like a skin irritation.  I am going to start her on some Lidex cream.  Apply twice a day for 3 weeks.  If it is still there in 3 weeks we will send her to dermatology.    Orders:  -     CBC Auto Differential; Future  -     CK; Future  -     Comprehensive Metabolic Panel; Future  -     Hemoglobin A1c; Future  -     Lipid Panel; Future  -     TSH; Future  -     Vitamin D,25-Hydroxy; Future  -     Hepatitis C Antibody; Future  -     Ferritin; Future  -     Iron Profile; Future  -     Vitamin B12; Future    8. Morbid (severe) obesity due to excess calories    9. Shortness of breath  Assessment & Plan:  No chest pain, nausea, or vomiting.  Happens mainly when she goes from lying to standing position.  Can walk up stairs okay she can walk outside okay.      Other orders  -     fluocinonide (LIDEX) 0.05 % cream; Apply 1 Application topically to the appropriate area as directed 2 (Two) Times a Day. Apply to affected area twice a day  Dispense: 60 g; Refill: 1             Follow Up:   No follow-ups on file.    Healthcare Maintenance:   Counseling provided on HM and immunizations. .   Daja Hubbard voices understanding and acceptance of this advice and will call back with any further questions or concerns. AVS with preventive healthcare tips printed for patient.     Sp Marie MD  [facility]   Answers submitted by the patient for this visit:  Primary Reason for Visit (Submitted on 2/8/2024)  What is the primary reason for your visit?: Rash  Rash Questionnaire (Submitted on 2/8/2024)  Chief Complaint: Rash  Chronicity: new  Onset: more than 1 month ago  Progression since onset: improving  Affected locations:  chest  Characteristics: blistering, burning, dryness, pain, redness, swelling, itchiness  Exposed to: nothing  anorexia: No  facial edema: No  joint pain: No  nail changes: No  Additional Information: Started small and got larger

## 2024-02-09 NOTE — ASSESSMENT & PLAN NOTE
No chest pain, nausea, or vomiting.  Happens mainly when she goes from lying to standing position.  Can walk up stairs okay she can walk outside okay.

## 2024-02-10 LAB
25(OH)D3+25(OH)D2 SERPL-MCNC: 28 NG/ML (ref 30–100)
ALBUMIN SERPL-MCNC: 4.4 G/DL (ref 3.9–4.9)
ALBUMIN/GLOB SERPL: 2 {RATIO} (ref 1.2–2.2)
ALP SERPL-CCNC: 87 IU/L (ref 44–121)
ALT SERPL-CCNC: 6 IU/L (ref 0–32)
AST SERPL-CCNC: 12 IU/L (ref 0–40)
BASOPHILS # BLD AUTO: 0 X10E3/UL (ref 0–0.2)
BASOPHILS NFR BLD AUTO: 0 %
BILIRUB SERPL-MCNC: 0.3 MG/DL (ref 0–1.2)
BUN SERPL-MCNC: 29 MG/DL (ref 8–27)
BUN/CREAT SERPL: 20 (ref 12–28)
CALCIUM SERPL-MCNC: 9.9 MG/DL (ref 8.7–10.3)
CHLORIDE SERPL-SCNC: 106 MMOL/L (ref 96–106)
CHOLEST SERPL-MCNC: 192 MG/DL (ref 100–199)
CK SERPL-CCNC: 64 U/L (ref 32–182)
CO2 SERPL-SCNC: 19 MMOL/L (ref 20–29)
CREAT SERPL-MCNC: 1.42 MG/DL (ref 0.57–1)
EGFRCR SERPLBLD CKD-EPI 2021: 42 ML/MIN/1.73
EOSINOPHIL # BLD AUTO: 0.1 X10E3/UL (ref 0–0.4)
EOSINOPHIL NFR BLD AUTO: 2 %
ERYTHROCYTE [DISTWIDTH] IN BLOOD BY AUTOMATED COUNT: 12.8 % (ref 11.7–15.4)
FERRITIN SERPL-MCNC: 55 NG/ML (ref 15–150)
GLOBULIN SER CALC-MCNC: 2.2 G/DL (ref 1.5–4.5)
GLUCOSE SERPL-MCNC: 118 MG/DL (ref 70–99)
HBA1C MFR BLD: 5.4 % (ref 4.8–5.6)
HCT VFR BLD AUTO: 37.3 % (ref 34–46.6)
HCV IGG SERPL QL IA: NON REACTIVE
HDLC SERPL-MCNC: 61 MG/DL
HGB BLD-MCNC: 12.3 G/DL (ref 11.1–15.9)
IMM GRANULOCYTES # BLD AUTO: 0 X10E3/UL (ref 0–0.1)
IMM GRANULOCYTES NFR BLD AUTO: 0 %
IRON SATN MFR SERPL: 18 % (ref 15–55)
IRON SERPL-MCNC: 66 UG/DL (ref 27–139)
LDLC SERPL CALC-MCNC: 120 MG/DL (ref 0–99)
LYMPHOCYTES # BLD AUTO: 3 X10E3/UL (ref 0.7–3.1)
LYMPHOCYTES NFR BLD AUTO: 37 %
MCH RBC QN AUTO: 28.7 PG (ref 26.6–33)
MCHC RBC AUTO-ENTMCNC: 33 G/DL (ref 31.5–35.7)
MCV RBC AUTO: 87 FL (ref 79–97)
MONOCYTES # BLD AUTO: 0.6 X10E3/UL (ref 0.1–0.9)
MONOCYTES NFR BLD AUTO: 7 %
NEUTROPHILS # BLD AUTO: 4.3 X10E3/UL (ref 1.4–7)
NEUTROPHILS NFR BLD AUTO: 54 %
PLATELET # BLD AUTO: 370 X10E3/UL (ref 150–450)
POTASSIUM SERPL-SCNC: 4.8 MMOL/L (ref 3.5–5.2)
PROT SERPL-MCNC: 6.6 G/DL (ref 6–8.5)
RBC # BLD AUTO: 4.28 X10E6/UL (ref 3.77–5.28)
SODIUM SERPL-SCNC: 139 MMOL/L (ref 134–144)
TIBC SERPL-MCNC: 370 UG/DL (ref 250–450)
TRIGL SERPL-MCNC: 58 MG/DL (ref 0–149)
TSH SERPL DL<=0.005 MIU/L-ACNC: 2.63 UIU/ML (ref 0.45–4.5)
UIBC SERPL-MCNC: 304 UG/DL (ref 118–369)
VIT B12 SERPL-MCNC: 1115 PG/ML (ref 232–1245)
VLDLC SERPL CALC-MCNC: 11 MG/DL (ref 5–40)
WBC # BLD AUTO: 8.1 X10E3/UL (ref 3.4–10.8)

## 2024-02-23 ENCOUNTER — TELEPHONE (OUTPATIENT)
Dept: GYNECOLOGIC ONCOLOGY | Facility: CLINIC | Age: 64
End: 2024-02-23
Payer: COMMERCIAL

## 2024-02-23 RX ORDER — VALSARTAN 80 MG/1
80 TABLET ORAL DAILY
Qty: 90 TABLET | Refills: 0 | OUTPATIENT
Start: 2024-02-23

## 2024-02-23 RX ORDER — AMLODIPINE BESYLATE 5 MG/1
5 TABLET ORAL DAILY
Qty: 90 TABLET | Refills: 0 | OUTPATIENT
Start: 2024-02-23

## 2024-02-23 NOTE — TELEPHONE ENCOUNTER
Spoke with patient. Advised that we are seeing only active cancer patients. Patient to follow up with Dr Ortiz

## 2024-02-23 NOTE — TELEPHONE ENCOUNTER
Caller: Daja Hubbard    Relationship to patient: Self    Best call back number: [phone]    Chief complaint: R/S MISSED FOLLOW UP     Type of visit: FOLLOW UP 2    Requested date: NEXT AVAILABLE , WOULD NEED MORNING APPT     If rescheduling, when is the original appointment: 10/19/23     Additional notes:PLEASE ADVISE    PATIENT OF DR TOMAS

## 2024-03-11 RX ORDER — MELOXICAM 15 MG/1
15 TABLET ORAL DAILY
Qty: 90 TABLET | Refills: 0 | Status: SHIPPED | OUTPATIENT
Start: 2024-03-11

## 2024-03-11 NOTE — TELEPHONE ENCOUNTER
Rx Refill Note    Requested Prescriptions     Pending Prescriptions Disp Refills    meloxicam (MOBIC) 15 MG tablet 90 tablet 0     Sig: Take 1 tablet by mouth Daily.        Last office visit with prescribing clinician: 2/9/2024      Next office visit with prescribing clinician: Visit date not found   Last labs:   Last refill: 09/11/2023   Pharmacy (be sure to add in Epic). Correct      Wants a 90 day because it is cheaper

## 2024-03-11 NOTE — TELEPHONE ENCOUNTER
Caller: Daja Hubbard    Relationship: Self    Best call back number: [phone]    Requested Prescriptions:   Requested Prescriptions     Pending Prescriptions Disp Refills    meloxicam (MOBIC) 15 MG tablet 30 tablet 0     Sig: Take 1 tablet by mouth Daily.        Pharmacy where request should be sent: [facility]/PHARMACY #[id number] - [facility] [address] - [phone]  - [phone] FX     Last office visit with prescribing clinician: 2/9/2024   Last telemedicine visit with prescribing clinician: Visit date not found   Next office visit with prescribing clinician: Visit date not found     Additional details provided by patient: PT REQUEST A 90 DAY SUPPLY BECAUSE ITS CHEAPER     Does the patient have less than a 3 day supply:  [x] Yes  [] No    Would you like a call back once the refill request has been completed: [] Yes [x] No    If the office needs to give you a call back, can they leave a voicemail: [] Yes [x] No    Ivanna Good Rep   03/11/24 09:32 EDT

## 2024-03-12 ENCOUNTER — OFFICE VISIT (OUTPATIENT)
Dept: FAMILY MEDICINE CLINIC | Facility: CLINIC | Age: 64
End: 2024-03-12
Payer: COMMERCIAL

## 2024-03-12 VITALS
HEART RATE: 94 BPM | SYSTOLIC BLOOD PRESSURE: 136 MMHG | WEIGHT: 216 LBS | HEIGHT: 64 IN | RESPIRATION RATE: 15 BRPM | BODY MASS INDEX: 36.88 KG/M2 | DIASTOLIC BLOOD PRESSURE: 84 MMHG | OXYGEN SATURATION: 99 %

## 2024-03-12 DIAGNOSIS — L01.00 IMPETIGO: Primary | ICD-10-CM

## 2024-03-12 RX ORDER — AMLODIPINE BESYLATE 5 MG/1
5 TABLET ORAL DAILY
Qty: 90 TABLET | Refills: 0 | Status: SHIPPED | OUTPATIENT
Start: 2024-03-12

## 2024-03-12 RX ORDER — VALSARTAN 80 MG/1
80 TABLET ORAL DAILY
Qty: 90 TABLET | Refills: 0 | Status: SHIPPED | OUTPATIENT
Start: 2024-03-12

## 2024-03-12 NOTE — ASSESSMENT & PLAN NOTE
This rash on her 2 fingers looks like a superficial skin infection, will treat with Bactroban ointment.  Clean and dry wounds 3 times a day and dressed with a nonstick dressing to hold the Bactroban in place.  Patient to let me know if not starting to improve in a few days.

## 2024-03-12 NOTE — PROGRESS NOTES
Patient Office Visit      Patient Name: Daja Hubbard  : 1960   MRN: [id number]     Chief Complaint:    Chief Complaint   Patient presents with    Rash       History of Present Illness: Daja Hubbard is a 63 y.o. female who is here today for open sores around her right third and fifth nail margins that started last week a couple of days after she had her nails done.     Subjective      Review of Systems:         Past Medical History:   Past Medical History:   Diagnosis Date    Anemia     Cervical high risk human papillomavirus (HPV) DNA test positive 2015    NON 16/18 POSITIVE    Cervical stricture or stenosis     cervical stricture and stenosis    Depression     Diverticulitis     Fibromyalgia     History of kidney stones     Hypertension     Kidney stone     Low grade squamous intraepithelial lesion (LGSIL) on cervical Pap smear 2020    colposcopy 2020 with BX at 6 and ECC; unsatisfactory; thick WE extending inside OS    Mild dysplasia of cervix     (DEV I)    Mild dysplasia of cervix 2020    ECC positive LGSIL; colposcopy unsatisfactory with WE extending inside os.    Mild dysplasia of cervix (DEV I) 2020    Recurrent UTI     Tobacco dependence     Urge incontinence     has a bladder stimulator    Varicella        Past Surgical History:   Past Surgical History:   Procedure Laterality Date    APPENDECTOMY      BLADDER SURGERY      implant for bladder     SECTION      COLON RESECTION N/A 2020    Procedure: LAPROSCOPIC LOWER ANTERIOR RESECTION, LOOP ILEOSTOMY CREATION, MOBOLIZATION OF SPLENIC FLEXURE, APPENDECTOMY;  Surgeon: Kojo Barney MD;  Location:  [facility];  Service: General;  Laterality: N/A;    COLONOSCOPY      COLPOSCOPY  2015    CYSTOSCOPY W/ URETERAL STENT PLACEMENT  2020    Procedure: CYSTOSCOPY URETERAL CATHETER INSERTION;  Surgeon: Kory Chang Jr., MD;  Location:  [facility];  Service: Urology;;    HERNIA REPAIR    "   INCISION AND DRAINAGE HEMATOMA Right     inner thigh    KIDNEY STONE SURGERY      LEEP  2020    OSTOMY TAKE DOWN      3/2021       Family History:   Family History   Problem Relation Age of Onset    Colon cancer Other     Osteoporosis Other     Alzheimer's disease Other     Breast cancer Neg Hx        Social History:   Social History     Socioeconomic History    Marital status:    Tobacco Use    Smoking status: Former     Current packs/day: 0.00     Average packs/day: 1 pack/day for 28.0 years (28.0 ttl pk-yrs)     Types: Cigarettes     Start date:      Quit date: 2018     Years since quittin.2     Passive exposure: Past    Smokeless tobacco: Never   Vaping Use    Vaping status: Some Days    Substances: Nicotine, Flavoring    Devices: Disposable, Pre-filled pod    Passive vaping exposure: Yes   Substance and Sexual Activity    Alcohol use: Not Currently     Comment: Per [facility]iera, occasional/no abuse    Drug use: Never    Sexual activity: Not Currently     Partners: Male     Birth control/protection: Post-menopausal       Allergies:   Allergies   Allergen Reactions    Sulfa Antibiotics Hives and Unknown - Low Severity     Itching, swelling       Objective     Physical Exam:  Vital Signs:   Vitals:    24 0853   BP: 136/84   BP Location: Right arm   Patient Position: Sitting   Cuff Size: Adult   Pulse: 94   Resp: 15   SpO2: 99%   Weight: 98 kg (216 lb)   Height: 162.6 cm (64\")     Body mass index is 37.08 kg/m².        Physical Exam  Constitutional:       Appearance: Normal appearance.   Skin:     Comments: Skin around the nail margins of the right third and fifth fingers show erythema, shallow ulceration and honey crusted discharge.   Neurological:      Mental Status: She is alert.         Procedures    Assessment / Plan      Assessment/Plan:   Diagnoses and all orders for this visit:    1. Impetigo (Primary)  Assessment & Plan:  This rash on her 2 fingers looks like a superficial " skin infection, will treat with Bactroban ointment.  Clean and dry wounds 3 times a day and dressed with a nonstick dressing to hold the Bactroban in place.  Patient to let me know if not starting to improve in a few days.    Orders:  -     mupirocin (BACTROBAN) 2 % ointment; Apply 1 Application topically to the appropriate area as directed 3 (Three) Times a Day.  Dispense: 30 g; Refill: 0           Medications:     Current Outpatient Medications:     Biotin 1 MG capsule, Take  by mouth., Disp: , Rfl:     cholecalciferol (VITAMIN D3) 25 MCG (1000 UT) tablet, Take 2 tablets by mouth Daily., Disp: , Rfl:     estradiol (ESTRACE) 0.1 MG/GM vaginal cream, INSERT 0.5 G INTO THE VAGINA 3 (THREE) TIMES A WEEK FOR 30 DAYS., Disp: , Rfl:     fluocinonide (LIDEX) 0.05 % cream, Apply 1 Application topically to the appropriate area as directed 2 (Two) Times a Day. Apply to affected area twice a day, Disp: 60 g, Rfl: 1    hydrOXYzine (ATARAX) 25 MG tablet, Take 1 tablet by mouth Every 4 (Four) Hours As Needed for Itching., Disp: 640 tablet, Rfl: 3    meloxicam (MOBIC) 15 MG tablet, Take 1 tablet by mouth Daily., Disp: 90 tablet, Rfl: 0    multivitamin with minerals tablet tablet, Take 1 tablet by mouth Daily., Disp: , Rfl:     amLODIPine (NORVASC) 5 MG tablet, TAKE 1 TABLET BY MOUTH EVERY DAY, Disp: 90 tablet, Rfl: 0    mupirocin (BACTROBAN) 2 % ointment, Apply 1 Application topically to the appropriate area as directed 3 (Three) Times a Day., Disp: 30 g, Rfl: 0    valsartan (DIOVAN) 80 MG tablet, TAKE 1 TABLET BY MOUTH EVERY DAY, Disp: 90 tablet, Rfl: 0        Follow Up:   No follow-ups on file.    Yamile Pruitt PA-C   [facility]     NOTE TO PATIENT: The 21st Century Cures Act makes medical notes like these available to patients in the interest of transparency. However, be advised this is a medical document. It is intended as peer to peer communication. It is written in medical language and may contain  abbreviations or verbiage that are unfamiliar. It may appear blunt or direct. Medical documents are intended to carry relevant information, facts as evident, and the clinical opinion of the practitioner.   Answers submitted by the patient for this visit:  Other (Submitted on 3/11/2024)  Please describe your symptoms.: Abrasions on two fingers that aren't healing and are getting worse.  Have you had these symptoms before?: No  How long have you been having these symptoms?: 5-7 days  Please list any medications you are currently taking for this condition.: Neosporin at first then Vaseline  Primary Reason for Visit (Submitted on 3/11/2024)  What is the primary reason for your visit?: Other

## 2024-04-04 ENCOUNTER — TELEPHONE (OUTPATIENT)
Dept: FAMILY MEDICINE CLINIC | Facility: CLINIC | Age: 64
End: 2024-04-04

## 2024-04-04 NOTE — TELEPHONE ENCOUNTER
Caller: Daja Hubbard    Relationship: Self    Best call back number: [phone]     Requested Prescriptions:   SOMETHING ELSE FOR IMPETIGO        Pharmacy where request should be sent: [facility]/PHARMACY #[id number] - [place], [place] - [address] - [phone]  - [phone] FX     Last office visit with prescribing clinician: 2/9/2024   Last telemedicine visit with prescribing clinician: Visit date not found   Next office visit with prescribing clinician: Visit date not found     Additional details provided by patient: IMPETIGO IS NOT GETTING ANY BETTER ON HER  FINGERS. THE MEDICATION GIVEN IS NOT HELPING . WANT TO KNOW IF WE CAN GIVE SOMETHIGN ELSE SINCE HURTING AND ITCHING.  WAS SEEN BY JEANNETTE ANSARI ON  3.12    Does the patient have less than a 3 day supply:  [x] Yes  [] No    Would you like a call back once the refill request has been completed: [x] Yes [] No    If the office needs to give you a call back, can they leave a voicemail: [x] Yes [] No    Ivanna Zendejas Rep   04/04/24 15:17 EDT

## 2024-04-05 DIAGNOSIS — L01.00 IMPETIGO: Primary | ICD-10-CM

## 2024-04-05 RX ORDER — CEPHALEXIN 500 MG/1
500 CAPSULE ORAL 4 TIMES DAILY
Qty: 40 CAPSULE | Refills: 0 | Status: SHIPPED | OUTPATIENT
Start: 2024-04-05

## 2024-04-05 NOTE — TELEPHONE ENCOUNTER
I spoke to patient, will try Keflex.  If this not working then we have to consider the possibility that this may not be a bacterial infection.  Herpetic gricelda is in the differential and she will let me know if not improving on the Keflex.

## 2024-04-17 ENCOUNTER — TELEPHONE (OUTPATIENT)
Dept: FAMILY MEDICINE CLINIC | Facility: CLINIC | Age: 64
End: 2024-04-17
Payer: COMMERCIAL

## 2024-04-17 NOTE — TELEPHONE ENCOUNTER
Caller: Daja Hubbard     Relationship: SELF    Best call back number: [phone]     What is your medical concern? SORES ON FINGERS    How long has this issue been going on? 2/27/24    Is your provider already aware of this issue? YES    Have you been treated for this issue? YES    PATIENT HAS BEEN CHANGING HER BANDAGES 3 TIMES DAILY. PATIENT HAS TAKEN ONE ROUND OF  ORAL ANTIBIOTICS. SHE IS STILL USING THE OINTMENT WHILE CHANGING THE BANDAGES.  ORIGINAL SORES WAS ON RIGHT HAND, NOW HAS SPREAD TO HER LEFT HAND THUMB, MIDDLE AND POINTER FINGERS.     PLEASE ADVISE NEXT STEP    PATIENT MAY BE AT HOSPITAL WITH . ITS OK TO LEAVE A VOICEMAIL.

## 2024-04-18 ENCOUNTER — OFFICE VISIT (OUTPATIENT)
Dept: FAMILY MEDICINE CLINIC | Facility: CLINIC | Age: 64
End: 2024-04-18
Payer: COMMERCIAL

## 2024-04-18 VITALS
HEART RATE: 77 BPM | SYSTOLIC BLOOD PRESSURE: 120 MMHG | DIASTOLIC BLOOD PRESSURE: 80 MMHG | WEIGHT: 218 LBS | HEIGHT: 64 IN | RESPIRATION RATE: 15 BRPM | OXYGEN SATURATION: 98 % | BODY MASS INDEX: 37.22 KG/M2

## 2024-04-18 DIAGNOSIS — B36.9 CUTANEOUS FUNGAL INFECTION: Primary | ICD-10-CM

## 2024-04-18 DIAGNOSIS — R21 RASH OF MULTIPLE FINGERS OF BOTH HANDS: ICD-10-CM

## 2024-04-18 RX ORDER — TERBINAFINE HYDROCHLORIDE 250 MG/1
250 TABLET ORAL DAILY
Qty: 45 TABLET | Refills: 0 | Status: SHIPPED | OUTPATIENT
Start: 2024-04-18

## 2024-04-18 NOTE — PROGRESS NOTES
Patient Office Visit      Patient Name: Daja Hubbard  : 1960   MRN: [id number]     Chief Complaint:    Chief Complaint   Patient presents with   • Rash       History of Present Illness: Daja Hubbard is a 63 y.o. female who is here today with worsening rash on her right 3rd and 5th fingers and, left thumb.  Now starting to spread to her left second and third fingers.  Rash is quite painful.  I initially thought this was impetigo and treated with a topical and then an oral antibiotic.  This all started about 2 days after she had her nails done.    Subjective      Review of Systems:         Past Medical History:   Past Medical History:   Diagnosis Date   • Anemia    • Cervical high risk human papillomavirus (HPV) DNA test positive 2015    NON 16/18 POSITIVE   • Cervical stricture or stenosis     cervical stricture and stenosis   • Depression    • Diverticulitis    • Fibromyalgia    • History of kidney stones    • Hypertension    • Kidney stone    • Low grade squamous intraepithelial lesion (LGSIL) on cervical Pap smear 2020    colposcopy 2020 with BX at 6 and ECC; unsatisfactory; thick WE extending inside OS   • Mild dysplasia of cervix     (DEV I)   • Mild dysplasia of cervix 2020    ECC positive LGSIL; colposcopy unsatisfactory with WE extending inside os.   • Mild dysplasia of cervix (DEV I) 2020   • Recurrent UTI    • Tobacco dependence    • Urge incontinence     has a bladder stimulator   • Varicella        Past Surgical History:   Past Surgical History:   Procedure Laterality Date   • APPENDECTOMY     • BLADDER SURGERY      implant for bladder   •  SECTION     • COLON RESECTION N/A 2020    Procedure: LAPROSCOPIC LOWER ANTERIOR RESECTION, LOOP ILEOSTOMY CREATION, MOBOLIZATION OF SPLENIC FLEXURE, APPENDECTOMY;  Surgeon: Kojo Barney MD;  Location: [facility];  Service: General;  Laterality: N/A;   • COLONOSCOPY     • COLPOSCOPY  2015   •  "CYSTOSCOPY W/ URETERAL STENT PLACEMENT  2020    Procedure: CYSTOSCOPY URETERAL CATHETER INSERTION;  Surgeon: Kory Chang Jr., MD;  Location: [facility];  Service: Urology;;   • HERNIA REPAIR     • INCISION AND DRAINAGE HEMATOMA Right     inner thigh   • KIDNEY STONE SURGERY     • LEEP  2020   • OSTOMY TAKE DOWN      3/2021       Family History:   Family History   Problem Relation Age of Onset   • Colon cancer Other    • Osteoporosis Other    • Alzheimer's disease Other    • Breast cancer Neg Hx        Social History:   Social History     Socioeconomic History   • Marital status:    Tobacco Use   • Smoking status: Former     Current packs/day: 0.00     Average packs/day: 1 pack/day for 28.0 years (28.0 ttl pk-yrs)     Types: Cigarettes     Start date:      Quit date: 2018     Years since quittin.3     Passive exposure: Past   • Smokeless tobacco: Never   Vaping Use   • Vaping status: Some Days   • Substances: Nicotine, Flavoring   • Devices: Disposable, Pre-filled pod   • Passive vaping exposure: Yes   Substance and Sexual Activity   • Alcohol use: Not Currently     Comment: Per Ciera, occasional/no abuse   • Drug use: Never   • Sexual activity: Not Currently     Partners: Male     Birth control/protection: Post-menopausal       Allergies:   Allergies   Allergen Reactions   • Sulfa Antibiotics Hives and Unknown - Low Severity     Itching, swelling       Objective     Physical Exam:  Vital Signs:   Vitals:    24 1307   BP: 120/80   BP Location: Right arm   Patient Position: Sitting   Cuff Size: Adult   Pulse: 77   Resp: 15   SpO2: 98%   Weight: 98.9 kg (218 lb)   Height: 162.6 cm (64\")     Body mass index is 37.42 kg/m².        Physical Exam  Skin:     Comments: Erythematous, tender and weeping rash on her fingers around the nail margins as noted in the HPI.         Procedures    Assessment / Plan      Assessment/Plan:   Diagnoses and all orders for this visit:    1. " Cutaneous fungal infection (Primary)  -     terbinafine (lamiSIL) 250 MG tablet; Take 1 tablet by mouth Daily.  Dispense: 45 tablet; Refill: 0    2. Rash of multiple fingers of both hands  -     Ambulatory Referral to Dermatology       Skin is macerated.  Stop wearing bandages as I think this is making things worse.  Allow skin to dry out is much as possible.  This really looks fungal and it is going to take quite some time to get back any type of fungal culture or to get her in with dermatology so we will go ahead and treat with terbinafine 250 mg daily x 6 weeks but will also go ahead and refer to dermatology.    Medications:     Current Outpatient Medications:   •  amLODIPine (NORVASC) 5 MG tablet, TAKE 1 TABLET BY MOUTH EVERY DAY, Disp: 90 tablet, Rfl: 0  •  Biotin 1 MG capsule, Take  by mouth., Disp: , Rfl:   •  cephalexin (Keflex) 500 MG capsule, Take 1 capsule by mouth 4 (Four) Times a Day., Disp: 40 capsule, Rfl: 0  •  cholecalciferol (VITAMIN D3) 25 MCG (1000 UT) tablet, Take 2 tablets by mouth Daily., Disp: , Rfl:   •  estradiol (ESTRACE) 0.1 MG/GM vaginal cream, INSERT 0.5 G INTO THE VAGINA 3 (THREE) TIMES A WEEK FOR 30 DAYS., Disp: , Rfl:   •  fluocinonide (LIDEX) 0.05 % cream, Apply 1 Application topically to the appropriate area as directed 2 (Two) Times a Day. Apply to affected area twice a day, Disp: 60 g, Rfl: 1  •  hydrOXYzine (ATARAX) 25 MG tablet, Take 1 tablet by mouth Every 4 (Four) Hours As Needed for Itching., Disp: 640 tablet, Rfl: 3  •  meloxicam (MOBIC) 15 MG tablet, Take 1 tablet by mouth Daily., Disp: 90 tablet, Rfl: 0  •  multivitamin with minerals tablet tablet, Take 1 tablet by mouth Daily., Disp: , Rfl:   •  mupirocin (BACTROBAN) 2 % ointment, Apply 1 Application topically to the appropriate area as directed 3 (Three) Times a Day., Disp: 30 g, Rfl: 0  •  valsartan (DIOVAN) 80 MG tablet, TAKE 1 TABLET BY MOUTH EVERY DAY, Disp: 90 tablet, Rfl: 0  •  terbinafine (lamiSIL) 250 MG tablet,  Take 1 tablet by mouth Daily., Disp: 45 tablet, Rfl: 0    I spent 30 minutes caring for Daja on this date of service. This time includes time spent by me in the following activities:preparing for the visit, obtaining and/or reviewing a separately obtained history, performing a medically appropriate examination and/or evaluation , counseling and educating the patient/family/caregiver, ordering medications, tests, or procedures, referring and communicating with other health care professionals , and documenting information in the medical record    Follow Up:   No follow-ups on file.    Yamile Pruitt PA-C   [facility]     NOTE TO PATIENT: The 21st Century Cures Act makes medical notes like these available to patients in the interest of transparency. However, be advised this is a medical document. It is intended as peer to peer communication. It is written in medical language and may contain abbreviations or verbiage that are unfamiliar. It may appear blunt or direct. Medical documents are intended to carry relevant information, facts as evident, and the clinical opinion of the practitioner.

## 2024-05-03 ENCOUNTER — TELEPHONE (OUTPATIENT)
Dept: FAMILY MEDICINE CLINIC | Facility: CLINIC | Age: 64
End: 2024-05-03
Payer: COMMERCIAL

## 2024-05-03 ENCOUNTER — OFFICE VISIT (OUTPATIENT)
Dept: FAMILY MEDICINE CLINIC | Facility: CLINIC | Age: 64
End: 2024-05-03
Payer: COMMERCIAL

## 2024-05-03 VITALS
OXYGEN SATURATION: 97 % | BODY MASS INDEX: 37.39 KG/M2 | DIASTOLIC BLOOD PRESSURE: 80 MMHG | WEIGHT: 219 LBS | HEIGHT: 64 IN | HEART RATE: 69 BPM | SYSTOLIC BLOOD PRESSURE: 130 MMHG | RESPIRATION RATE: 15 BRPM

## 2024-05-03 DIAGNOSIS — L30.9 DERMATITIS: Primary | ICD-10-CM

## 2024-05-03 PROCEDURE — 99213 OFFICE O/P EST LOW 20 MIN: CPT | Performed by: PHYSICIAN ASSISTANT

## 2024-05-03 RX ORDER — PREDNISONE 20 MG/1
TABLET ORAL
Qty: 19 TABLET | Refills: 0 | Status: SHIPPED | OUTPATIENT
Start: 2024-05-03 | End: 2024-05-13

## 2024-05-03 NOTE — ASSESSMENT & PLAN NOTE
Very concerning for psoriasis.  She does have dermatology appointment but this is a month away.  I did take photos for her chart.  She will take some photos on her phone when she gets home to have to show to dermatology.  We will go ahead and treat with a round of prednisone.  We did discuss psoriasis being a chronic autoimmune condition that is manageable but not curable.

## 2024-05-03 NOTE — PROGRESS NOTES
Patient Office Visit      Patient Name: Daja Hubbard  : 1960   MRN: [id number]     Chief Complaint:    Chief Complaint   Patient presents with    Rash       History of Present Illness: Daja Hubbard is a 63 y.o. female who is here today for rash around left eyelid.  She still has rash around her fingers and some spots on her hands but they are drying up to some degree.  Still very itchy.  She also mentions rash at the nape of her neck that is very itchy.  She does have an appointment on Shae 3 at 2 pm with dermatology.    Subjective      Review of Systems:         Past Medical History:   Past Medical History:   Diagnosis Date    Anemia     Cervical high risk human papillomavirus (HPV) DNA test positive 2015    NON 16/18 POSITIVE    Cervical stricture or stenosis     cervical stricture and stenosis    Depression     Diverticulitis     Fibromyalgia     History of kidney stones     Hypertension     Kidney stone     Low grade squamous intraepithelial lesion (LGSIL) on cervical Pap smear 2020    colposcopy 2020 with BX at 6 and ECC; unsatisfactory; thick WE extending inside OS    Mild dysplasia of cervix     (DEV I)    Mild dysplasia of cervix 2020    ECC positive LGSIL; colposcopy unsatisfactory with WE extending inside os.    Mild dysplasia of cervix (DEV I) 2020    Recurrent UTI     Tobacco dependence     Urge incontinence     has a bladder stimulator    Varicella        Past Surgical History:   Past Surgical History:   Procedure Laterality Date    APPENDECTOMY      BLADDER SURGERY      implant for bladder     SECTION      COLON RESECTION N/A 2020    Procedure: LAPROSCOPIC LOWER ANTERIOR RESECTION, LOOP ILEOSTOMY CREATION, MOBOLIZATION OF SPLENIC FLEXURE, APPENDECTOMY;  Surgeon: Kojo Barney MD;  [facility]  Service: General;  Laterality: N/A;    COLONOSCOPY      COLPOSCOPY  2015    CYSTOSCOPY W/ URETERAL STENT PLACEMENT  2020  "   Procedure: CYSTOSCOPY URETERAL CATHETER INSERTION;  Surgeon: Kory Chang Jr., MD;  Location: [facility];  Service: Urology;;    HERNIA REPAIR      INCISION AND DRAINAGE HEMATOMA Right     inner thigh    KIDNEY STONE SURGERY      LEEP  2020    OSTOMY TAKE DOWN      3/2021       Family History:   Family History   Problem Relation Age of Onset    Colon cancer Other     Osteoporosis Other     Alzheimer's disease Other     Breast cancer Neg Hx        Social History:   Social History     Socioeconomic History    Marital status:    Tobacco Use    Smoking status: Former     Current packs/day: 0.00     Average packs/day: 1 pack/day for 28.0 years (28.0 ttl pk-yrs)     Types: Cigarettes     Start date:      Quit date: 2018     Years since quittin.3     Passive exposure: Past    Smokeless tobacco: Never   Vaping Use    Vaping status: Some Days    Substances: Nicotine, Flavoring    Devices: Disposable, Pre-filled pod    Passive vaping exposure: Yes   Substance and Sexual Activity    Alcohol use: Not Currently     Comment: Per Ciera, occasional/no abuse    Drug use: Never    Sexual activity: Not Currently     Partners: Male     Birth control/protection: Post-menopausal       Allergies:   Allergies   Allergen Reactions    Sulfa Antibiotics Hives and Unknown - Low Severity     Itching, swelling       Objective     Physical Exam:  Vital Signs:   Vitals:    24 0942   BP: 130/80   BP Location: Right arm   Patient Position: Sitting   Cuff Size: Adult   Pulse: 69   Resp: 15   SpO2: 97%   Weight: 99.3 kg (219 lb)   Height: 162.6 cm (64\")     Body mass index is 37.59 kg/m².        Physical Exam  Skin:     Comments: See media, erythematous crusting rash left upper and lower eyelid.  Still has rash around the nail margins but more dry, erythematous and crusting.  There is a large patch at the nape of her neck of dull erythema and crusting.   Neurological:      Mental Status: She is alert. "         Procedures    Assessment / Plan      Assessment/Plan:   Diagnoses and all orders for this visit:    1. Dermatitis (Primary)  Assessment & Plan:  Very concerning for psoriasis.  She does have dermatology appointment but this is a month away.  I did take photos for her chart.  She will take some photos on her phone when she gets home to have to show to dermatology.  We will go ahead and treat with a round of prednisone.  We did discuss psoriasis being a chronic autoimmune condition that is manageable but not curable.    Orders:  -     predniSONE (DELTASONE) 20 MG tablet; Take 3 tablets by mouth Daily for 3 days, THEN 2 tablets Daily for 3 days, THEN 1 tablet Daily for 4 days.  Dispense: 19 tablet; Refill: 0           Medications:     Current Outpatient Medications:     amLODIPine (NORVASC) 5 MG tablet, TAKE 1 TABLET BY MOUTH EVERY DAY, Disp: 90 tablet, Rfl: 0    Biotin 1 MG capsule, Take  by mouth., Disp: , Rfl:     cholecalciferol (VITAMIN D3) 25 MCG (1000 UT) tablet, Take 2 tablets by mouth Daily., Disp: , Rfl:     estradiol (ESTRACE) 0.1 MG/GM vaginal cream, INSERT 0.5 G INTO THE VAGINA 3 (THREE) TIMES A WEEK FOR 30 DAYS., Disp: , Rfl:     hydrOXYzine (ATARAX) 25 MG tablet, Take 1 tablet by mouth Every 4 (Four) Hours As Needed for Itching., Disp: 640 tablet, Rfl: 3    meloxicam (MOBIC) 15 MG tablet, Take 1 tablet by mouth Daily., Disp: 90 tablet, Rfl: 0    multivitamin with minerals tablet tablet, Take 1 tablet by mouth Daily., Disp: , Rfl:     terbinafine (lamiSIL) 250 MG tablet, Take 1 tablet by mouth Daily., Disp: 45 tablet, Rfl: 0    valsartan (DIOVAN) 80 MG tablet, TAKE 1 TABLET BY MOUTH EVERY DAY, Disp: 90 tablet, Rfl: 0    predniSONE (DELTASONE) 20 MG tablet, Take 3 tablets by mouth Daily for 3 days, THEN 2 tablets Daily for 3 days, THEN 1 tablet Daily for 4 days., Disp: 19 tablet, Rfl: 0        Follow Up:   No follow-ups on file.    Yamile Pruitt PA-C   [facility]     NOTE TO  PATIENT: The 21st Century Cures Act makes medical notes like these available to patients in the interest of transparency. However, be advised this is a medical document. It is intended as peer to peer communication. It is written in medical language and may contain abbreviations or verbiage that are unfamiliar. It may appear blunt or direct. Medical documents are intended to carry relevant information, facts as evident, and the clinical opinion of the practitioner.

## 2024-05-18 ENCOUNTER — TELEPHONE (OUTPATIENT)
Dept: FAMILY MEDICINE CLINIC | Facility: CLINIC | Age: 64
End: 2024-05-18
Payer: COMMERCIAL

## 2024-05-18 DIAGNOSIS — R21 RASH OF MULTIPLE FINGERS OF BOTH HANDS: Primary | ICD-10-CM

## 2024-05-18 NOTE — TELEPHONE ENCOUNTER
PT CALLED IN ON SATURDAY AND SHE STATES SHE'S SEEN JEANNETTE 3 TIMES AND ALL 3 TIMES SHE HAS MISDIAGNOSED HER. SHE STATES THE SWELLING IN HER FINGERS AND EYES ARE STARTING BACK UP SINCE SHE'S BEEN OFF HER STEROID. I TOLD HER SHE WOULD PROBABLY NEED TO BE SEEN AGAIN SO JEANNETTE COULD REEVALUATE HER  AND SHE REFUSED TO SCHEDULED. SHE STATES SHE WANTS JEANNETTE TO GIVER HER A CALL.

## 2024-05-20 RX ORDER — TRIAMCINOLONE ACETONIDE 1 MG/G
1 CREAM TOPICAL 2 TIMES DAILY
Qty: 30 G | Refills: 1 | Status: SHIPPED | OUTPATIENT
Start: 2024-05-20

## 2024-05-20 NOTE — TELEPHONE ENCOUNTER
Has derm appt Shae 3.  Rash was almost gone with prednisone but started back up again within a few days.  Worst is around one of her eyes and one of her fingers. Will try to manage with topical until she can get in with dermatology.

## 2024-05-28 RX ORDER — AMLODIPINE BESYLATE 5 MG/1
5 TABLET ORAL DAILY
Qty: 90 TABLET | Refills: 0 | Status: SHIPPED | OUTPATIENT
Start: 2024-05-28

## 2024-05-28 RX ORDER — MELOXICAM 15 MG/1
15 TABLET ORAL DAILY
Qty: 90 TABLET | Refills: 0 | Status: SHIPPED | OUTPATIENT
Start: 2024-05-28

## 2024-05-28 RX ORDER — VALSARTAN 80 MG/1
80 TABLET ORAL DAILY
Qty: 90 TABLET | Refills: 0 | Status: SHIPPED | OUTPATIENT
Start: 2024-05-28

## 2024-08-25 DIAGNOSIS — K52.831 COLLAGENOUS COLITIS: ICD-10-CM

## 2024-08-26 RX ORDER — BUDESONIDE 3 MG/1
CAPSULE, COATED PELLETS ORAL
Qty: 270 CAPSULE | Refills: 0 | Status: SHIPPED | OUTPATIENT
Start: 2024-08-26

## 2024-08-26 RX ORDER — VALSARTAN 80 MG/1
80 TABLET ORAL DAILY
Qty: 90 TABLET | Refills: 0 | Status: SHIPPED | OUTPATIENT
Start: 2024-08-26

## 2024-08-26 NOTE — TELEPHONE ENCOUNTER
Rx Refill Note    Requested Prescriptions     Pending Prescriptions Disp Refills    Budesonide (ENTOCORT EC) 3 MG 24 hr capsule [Pharmacy Med Name: BUDESONIDE EC 3 MG CAPSULE] 270 capsule 0     Sig: TAKE 3 CAPSULES BY MOUTH ONE TIME DAILY        Last office visit with prescribing clinician: 5/3/2024      Next office visit with prescribing clinician: Visit date not found   Last labs:   Last refill: n/a   Pharmacy (be sure to add in Epic). correct

## 2024-09-25 DIAGNOSIS — F41.9 ANXIETY: ICD-10-CM

## 2024-09-25 RX ORDER — HYDROXYZINE HYDROCHLORIDE 25 MG/1
25 TABLET, FILM COATED ORAL EVERY 4 HOURS PRN
Qty: 540 TABLET | Refills: 4 | Status: SHIPPED | OUTPATIENT
Start: 2024-09-25

## 2024-09-26 RX ORDER — MELOXICAM 15 MG/1
15 TABLET ORAL DAILY
Qty: 90 TABLET | Refills: 0 | Status: SHIPPED | OUTPATIENT
Start: 2024-09-26

## 2024-10-25 DIAGNOSIS — K52.831 COLLAGENOUS COLITIS: ICD-10-CM

## 2024-10-25 DIAGNOSIS — B36.9 CUTANEOUS FUNGAL INFECTION: ICD-10-CM

## 2024-10-25 RX ORDER — MELOXICAM 15 MG/1
15 TABLET ORAL DAILY
Qty: 90 TABLET | Refills: 0 | Status: SHIPPED | OUTPATIENT
Start: 2024-10-25

## 2024-10-25 RX ORDER — VALSARTAN 80 MG/1
80 TABLET ORAL DAILY
Qty: 90 TABLET | Refills: 0 | Status: SHIPPED | OUTPATIENT
Start: 2024-10-25

## 2024-10-25 RX ORDER — TERBINAFINE HYDROCHLORIDE 250 MG/1
250 TABLET ORAL DAILY
Qty: 30 TABLET | Refills: 0 | OUTPATIENT
Start: 2024-10-25

## 2024-10-25 RX ORDER — AMLODIPINE BESYLATE 5 MG/1
5 TABLET ORAL DAILY
Qty: 90 TABLET | Refills: 0 | Status: SHIPPED | OUTPATIENT
Start: 2024-10-25

## 2024-10-25 RX ORDER — BUDESONIDE 3 MG/1
CAPSULE, COATED PELLETS ORAL
Qty: 270 CAPSULE | Refills: 0 | Status: SHIPPED | OUTPATIENT
Start: 2024-10-25

## 2024-10-25 NOTE — TELEPHONE ENCOUNTER
Rx Refill Note    Requested Prescriptions     Pending Prescriptions Disp Refills    terbinafine (lamiSIL) 250 MG tablet [Pharmacy Med Name: TERBINAFINE  MG TABLET] 30 tablet 0     Sig: TAKE 1 TABLET BY MOUTH EVERY DAY        Last office visit with prescribing clinician: 5/3/2024      Next office visit with prescribing clinician: Visit date not found   Last labs:   Last refill: 04/18/2024   Pharmacy (be sure to add in Epic). correct

## 2024-10-25 NOTE — TELEPHONE ENCOUNTER
Rx Refill Note    Requested Prescriptions     Pending Prescriptions Disp Refills    amLODIPine (NORVASC) 5 MG tablet [Pharmacy Med Name: AMLODIPINE BESYLATE 5 MG TAB] 90 tablet 0     Sig: TAKE 1 TABLET BY MOUTH EVERY DAY    Budesonide (ENTOCORT EC) 3 MG 24 hr capsule [Pharmacy Med Name: BUDESONIDE EC 3 MG CAPSULE] 270 capsule 0     Sig: TAKE 3 CAPSULES BY MOUTH ONE TIME DAILY    valsartan (DIOVAN) 80 MG tablet [Pharmacy Med Name: VALSARTAN 80 MG TABLET] 90 tablet 0     Sig: TAKE 1 TABLET BY MOUTH EVERY DAY    meloxicam (MOBIC) 15 MG tablet [Pharmacy Med Name: MELOXICAM 15 MG TABLET] 90 tablet 0     Sig: TAKE 1 TABLET BY MOUTH EVERY DAY        Last office visit with prescribing clinician: 2/9/2024      Next office visit with prescribing clinician: Visit date not found   Last labs:   Last refill: needs   Pharmacy (be sure to add in Epic). correct

## 2024-10-28 ENCOUNTER — TELEPHONE (OUTPATIENT)
Dept: FAMILY MEDICINE CLINIC | Facility: CLINIC | Age: 64
End: 2024-10-28
Payer: COMMERCIAL

## 2024-10-28 NOTE — TELEPHONE ENCOUNTER
HUB TO RELAY    LEFT PT A VM. DR ARENAS HAS DENIED PT MEDICATION REFILLS REQUEST AS HE IS NEEDING TO SEE PT IN OFFICE FOR AN APPT. PLEASE SCHEDULED AT PROVIDERS NEXT AVAILABLE

## 2024-11-06 RX ORDER — AMLODIPINE BESYLATE 5 MG/1
5 TABLET ORAL DAILY
Qty: 90 TABLET | Refills: 0 | OUTPATIENT
Start: 2024-11-06

## 2024-11-06 NOTE — TELEPHONE ENCOUNTER
Caller: Daja Hubbard    Relationship: Self    Best call back number: [phone]     Requested Prescriptions:   Requested Prescriptions     Pending Prescriptions Disp Refills    amLODIPine (NORVASC) 5 MG tablet 90 tablet 0     Sig: Take 1 tablet by mouth Daily.        Pharmacy where request should be sent: [facility]ARMACY #[id number] - [address] [address] - [phone]  - [phone]     Last office visit with prescribing clinician: 2/9/2024   Last telemedicine visit with prescribing clinician: Visit date not found   Next office visit with prescribing clinician: 12/10/2024         Does the patient have less than a 3 day supply:  [x] Yes  [] No    Would you like a call back once the refill request has been completed: [] Yes [x] No    If the office needs to give you a call back, can they leave a voicemail: [] Yes [x] No    Ivanna Tay Rep   11/06/24 09:24 EST

## 2024-12-10 NOTE — PROGRESS NOTES
Patient Name: Daja Hubbard  : 1960   MRN: [id number]     Chief Complaint:    Chief Complaint   Patient presents with    Primary Care Follow-Up    Depression    Anxiety       History of Present Illness: Daja Hubbard is a 64 y.o. female who is here today for follow up on vitamin D and cholesterol.  HPI        Review of Systems:   Review of Systems   Constitutional: Negative.    HENT: Negative.     Eyes: Negative.    Respiratory: Negative.     Cardiovascular: Negative.    Gastrointestinal: Negative.    Neurological: Negative.         Past Medical History:   Past Medical History:   Diagnosis Date    Anemia     Cervical high risk human papillomavirus (HPV) DNA test positive 2015    NON 16/18 POSITIVE    Cervical stricture or stenosis     cervical stricture and stenosis    Depression     Diverticulitis     Fibromyalgia     History of kidney stones     Hypertension     Kidney stone     Low grade squamous intraepithelial lesion (LGSIL) on cervical Pap smear 2020    colposcopy 2020 with BX at 6 and ECC; unsatisfactory; thick WE extending inside OS    Mild dysplasia of cervix     (DEV I)    Mild dysplasia of cervix 2020    ECC positive LGSIL; colposcopy unsatisfactory with WE extending inside os.    Mild dysplasia of cervix (DEV I) 2020    Recurrent UTI     Tobacco dependence     Urge incontinence     has a bladder stimulator    Varicella        Past Surgical History:   Past Surgical History:   Procedure Laterality Date    APPENDECTOMY      BLADDER SURGERY      implant for bladder     SECTION      COLON RESECTION N/A 2020    Procedure: LAPROSCOPIC LOWER ANTERIOR RESECTION, LOOP ILEOSTOMY CREATION, MOBOLIZATION OF SPLENIC FLEXURE, APPENDECTOMY;  Surgeon: Kojo Barney MD;  Location: [facility];  Service: General;  Laterality: N/A;    COLONOSCOPY      COLPOSCOPY  2015    CYSTOSCOPY W/ URETERAL STENT PLACEMENT  2020    Procedure: CYSTOSCOPY URETERAL  CATHETER INSERTION;  Surgeon: Kory Chang Jr., MD;  Location: [facility];  Service: Urology;;    HERNIA REPAIR      INCISION AND DRAINAGE HEMATOMA Right     inner thigh    KIDNEY STONE SURGERY      LEEP  2020    OSTOMY TAKE DOWN      3/2021       Family History:   Family History   Problem Relation Age of Onset    Colon cancer Other     Osteoporosis Other     Alzheimer's disease Other     Breast cancer Neg Hx        Social History:   Social History     Socioeconomic History    Marital status:    Tobacco Use    Smoking status: Former     Current packs/day: 0.00     Average packs/day: 1 pack/day for 28.0 years (28.0 ttl pk-yrs)     Types: Cigarettes     Start date:      Quit date: 2018     Years since quittin.9     Passive exposure: Past    Smokeless tobacco: Never   Vaping Use    Vaping status: Some Days    Substances: Nicotine, Flavoring    Devices: Disposable, Pre-filled pod    Passive vaping exposure: Yes   Substance and Sexual Activity    Alcohol use: Not Currently     Comment: Per [facility]iera, occasional/no abuse    Drug use: Never    Sexual activity: Not Currently     Partners: Male     Birth control/protection: Post-menopausal       Medications:     Current Outpatient Medications:     amLODIPine (NORVASC) 5 MG tablet, TAKE 1 TABLET BY MOUTH EVERY DAY, Disp: 90 tablet, Rfl: 0    Biotin 1 MG capsule, Take  by mouth., Disp: , Rfl:     Budesonide (ENTOCORT EC) 3 MG 24 hr capsule, TAKE 3 CAPSULES BY MOUTH ONE TIME DAILY, Disp: 270 capsule, Rfl: 0    cholecalciferol (VITAMIN D3) 25 MCG (1000 UT) tablet, Take 2 tablets by mouth Daily., Disp: , Rfl:     hydrOXYzine (ATARAX) 25 MG tablet, TAKE 1 TABLET BY MOUTH EVERY 4 (FOUR) HOURS AS NEEDED FOR ITCHING., Disp: 540 tablet, Rfl: 4    meloxicam (MOBIC) 15 MG tablet, TAKE 1 TABLET BY MOUTH EVERY DAY, Disp: 90 tablet, Rfl: 0    multivitamin with minerals tablet tablet, Take 1 tablet by mouth Daily., Disp: , Rfl:     terbinafine (lamiSIL) 250  "MG tablet, Take 1 tablet by mouth Daily., Disp: 45 tablet, Rfl: 0    triamcinolone (KENALOG) 0.1 % cream, Apply 1 Application topically to the appropriate area as directed 2 (Two) Times a Day., Disp: 30 g, Rfl: 1    valsartan (DIOVAN) 80 MG tablet, TAKE 1 TABLET BY MOUTH EVERY DAY, Disp: 90 tablet, Rfl: 0    citalopram (CeleXA) 40 MG tablet, Take 1 tablet by mouth Daily., Disp: 90 tablet, Rfl: 1    Allergies:   Allergies   Allergen Reactions    Bacitracin Myalgia    Polymyxin B Myalgia    Sulfa Antibiotics Hives and Unknown - Low Severity     Itching, swelling         Physical Exam:  Vital Signs:   Vitals:    12/11/24 1304   BP: 148/82   BP Location: Left arm   Patient Position: Sitting   Cuff Size: Large Adult   Pulse: 68   SpO2: 98%   Weight: 101 kg (222 lb 8 oz)   Height: 162.6 cm (64.02\")   PainSc: 0-No pain     Body mass index is 38.17 kg/m².     Physical Exam  Vitals and nursing note reviewed.   Constitutional:       Appearance: Normal appearance. She is normal weight.   HENT:      Head: Normocephalic and atraumatic.      Right Ear: Tympanic membrane, ear canal and external ear normal.      Left Ear: Tympanic membrane, ear canal and external ear normal.      Nose: Nose normal.      Mouth/Throat:      Mouth: Mucous membranes are dry.      Pharynx: Oropharynx is clear.   Eyes:      Extraocular Movements: Extraocular movements intact.      Conjunctiva/sclera: Conjunctivae normal.      Pupils: Pupils are equal, round, and reactive to light.   Cardiovascular:      Rate and Rhythm: Normal rate and regular rhythm.      Pulses: Normal pulses.      Heart sounds: Normal heart sounds.   Pulmonary:      Effort: Pulmonary effort is normal.      Breath sounds: Normal breath sounds.   Musculoskeletal:      Cervical back: Normal range of motion and neck supple.   Feet:      Comments:      Neurological:      Mental Status: She is alert.         Procedures      Assessment/Plan:   Diagnoses and all orders for this visit:    1. " Essential hypertension (Primary)  Assessment & Plan:  Discussed with patient to monitor their blood pressure and if systolic blood pressure goes above 140 or diastolic is above 90 to return to clinic.  Take medicines as directed, call for any problems, patient not having or any worrisome symptoms.        Orders:  -     Comprehensive Metabolic Panel; Future  -     Hemoglobin A1c; Future  -     Lipid Panel; Future  -     CBC & Differential; Future  -     Vitamin B12; Future  -     Vitamin D,25-Hydroxy; Future  -     TSH Rfx On Abnormal To Free T4; Future    2. Morbid (severe) obesity due to excess calories  Assessment & Plan:  Patient's (There is no height or weight on file to calculate BMI.) indicates that they are obese (BMI >30) with health conditions that include hypertension . Weight is unchanged. BMI  is above average; BMI management plan is completed. We discussed portion control and increasing exercise.    Patient does not have any contraindications to a GLP-1.  She does have a history of microscopic colitis for which she takes budesonide.  She is going to get her GI opinion, Dr. Kojo Guerrero, and see if she would be a candidate for GLP-1.    Orders:  -     Comprehensive Metabolic Panel; Future  -     Hemoglobin A1c; Future  -     Lipid Panel; Future  -     CBC & Differential; Future  -     Vitamin B12; Future  -     Vitamin D,25-Hydroxy; Future  -     TSH Rfx On Abnormal To Free T4; Future    3. Vitamin B12 deficiency  Assessment & Plan:  Blood work    Orders:  -     Comprehensive Metabolic Panel; Future  -     Hemoglobin A1c; Future  -     Lipid Panel; Future  -     CBC & Differential; Future  -     Vitamin B12; Future  -     Vitamin D,25-Hydroxy; Future  -     TSH Rfx On Abnormal To Free T4; Future    4. Vitamin D deficiency  Assessment & Plan:  Blood work    Orders:  -     Comprehensive Metabolic Panel; Future  -     Hemoglobin A1c; Future  -     Lipid Panel; Future  -     CBC & Differential; Future  -      Vitamin B12; Future  -     Vitamin D,25-Hydroxy; Future  -     TSH Rfx On Abnormal To Free T4; Future    5. Chronic kidney disease, stage 3b  Assessment & Plan:  Blood work.  Patient is instructed to not take any NSAIDs.  Medicines as directed.  Stay well-hydrated.      Orders:  -     Comprehensive Metabolic Panel; Future  -     Hemoglobin A1c; Future  -     Lipid Panel; Future  -     CBC & Differential; Future  -     Vitamin B12; Future  -     Vitamin D,25-Hydroxy; Future  -     TSH Rfx On Abnormal To Free T4; Future    6. Adjustment disorder with anxious mood  Assessment & Plan:  Patient is  to a narcissist.  She wants to go back on her citalopram.  I am going to send her for therapy.    Orders:  -     citalopram (CeleXA) 40 MG tablet; Take 1 tablet by mouth Daily.  Dispense: 90 tablet; Refill: 1  -     Ambulatory Referral to Psychology  -     Comprehensive Metabolic Panel; Future  -     Hemoglobin A1c; Future  -     Lipid Panel; Future  -     CBC & Differential; Future  -     Vitamin B12; Future  -     Vitamin D,25-Hydroxy; Future  -     TSH Rfx On Abnormal To Free T4; Future    7. Microscopic colitis, unspecified microscopic colitis type  -     Ambulatory Referral to Gastroenterology  -     Comprehensive Metabolic Panel; Future  -     Hemoglobin A1c; Future  -     Lipid Panel; Future  -     CBC & Differential; Future  -     Vitamin B12; Future  -     Vitamin D,25-Hydroxy; Future  -     TSH Rfx On Abnormal To Free T4; Future    Other orders  -     Fluzone >6mos             Follow Up:   Return in about 6 weeks (around 1/22/2025) for Annual physical, Bloodwork 1 week prior to next appointment.      Sp Marie MD  [facility] [place] [facility]

## 2024-12-10 NOTE — ASSESSMENT & PLAN NOTE
Patient's (There is no height or weight on file to calculate BMI.) indicates that they are obese (BMI >30) with health conditions that include hypertension . Weight is unchanged. BMI  is above average; BMI management plan is completed. We discussed portion control and increasing exercise.    Patient does not have any contraindications to a GLP-1.  She does have a history of microscopic colitis for which she takes budesonide.  She is going to get her GI opinion, Dr. Kojo Guerrero, and see if she would be a candidate for GLP-1.

## 2024-12-10 NOTE — ASSESSMENT & PLAN NOTE
Blood work.  Patient is instructed to not take any NSAIDs.  Medicines as directed.  Stay well-hydrated.

## 2024-12-11 ENCOUNTER — OFFICE VISIT (OUTPATIENT)
Dept: FAMILY MEDICINE CLINIC | Facility: CLINIC | Age: 64
End: 2024-12-11
Payer: COMMERCIAL

## 2024-12-11 VITALS
SYSTOLIC BLOOD PRESSURE: 148 MMHG | HEART RATE: 68 BPM | DIASTOLIC BLOOD PRESSURE: 82 MMHG | HEIGHT: 64 IN | BODY MASS INDEX: 37.98 KG/M2 | OXYGEN SATURATION: 98 % | WEIGHT: 222.5 LBS

## 2024-12-11 DIAGNOSIS — E66.01 MORBID (SEVERE) OBESITY DUE TO EXCESS CALORIES: ICD-10-CM

## 2024-12-11 DIAGNOSIS — F43.22 ADJUSTMENT DISORDER WITH ANXIOUS MOOD: ICD-10-CM

## 2024-12-11 DIAGNOSIS — I10 ESSENTIAL HYPERTENSION: Primary | ICD-10-CM

## 2024-12-11 DIAGNOSIS — N18.32 CHRONIC KIDNEY DISEASE, STAGE 3B: ICD-10-CM

## 2024-12-11 DIAGNOSIS — E53.8 VITAMIN B12 DEFICIENCY: ICD-10-CM

## 2024-12-11 DIAGNOSIS — K52.839 MICROSCOPIC COLITIS, UNSPECIFIED MICROSCOPIC COLITIS TYPE: ICD-10-CM

## 2024-12-11 DIAGNOSIS — E55.9 VITAMIN D DEFICIENCY: ICD-10-CM

## 2024-12-11 PROCEDURE — 99214 OFFICE O/P EST MOD 30 MIN: CPT | Performed by: FAMILY MEDICINE

## 2024-12-11 PROCEDURE — 90471 IMMUNIZATION ADMIN: CPT | Performed by: FAMILY MEDICINE

## 2024-12-11 PROCEDURE — 90656 IIV3 VACC NO PRSV 0.5 ML IM: CPT | Performed by: FAMILY MEDICINE

## 2024-12-11 RX ORDER — CITALOPRAM HYDROBROMIDE 40 MG/1
40 TABLET ORAL DAILY
Qty: 90 TABLET | Refills: 1 | Status: SHIPPED | OUTPATIENT
Start: 2024-12-11

## 2024-12-11 NOTE — ASSESSMENT & PLAN NOTE
Patient is  to a narcissist.  She wants to go back on her citalopram.  I am going to send her for therapy.

## 2025-02-23 DIAGNOSIS — K52.831 COLLAGENOUS COLITIS: ICD-10-CM

## 2025-02-24 RX ORDER — AMLODIPINE BESYLATE 5 MG/1
5 TABLET ORAL DAILY
Qty: 90 TABLET | Refills: 0 | Status: SHIPPED | OUTPATIENT
Start: 2025-02-24

## 2025-02-24 RX ORDER — BUDESONIDE 3 MG/1
CAPSULE, COATED PELLETS ORAL
Qty: 270 CAPSULE | Refills: 0 | Status: SHIPPED | OUTPATIENT
Start: 2025-02-24

## 2025-02-25 RX ORDER — VALSARTAN 80 MG/1
80 TABLET ORAL DAILY
Qty: 90 TABLET | Refills: 1 | Status: SHIPPED | OUTPATIENT
Start: 2025-02-25

## (undated) DEVICE — ANTIBACTERIAL UNDYED BRAIDED (POLYGLACTIN 910), SYNTHETIC ABSORBABLE SUTURE: Brand: COATED VICRYL

## (undated) DEVICE — SUT PROLN 2/0 KS 30IN 8623H

## (undated) DEVICE — MEDI-VAC NON-CONDUCTIVE SUCTION TUBING: Brand: CARDINAL HEALTH

## (undated) DEVICE — TUBING, SUCTION, 1/4" X 10', STRAIGHT: Brand: MEDLINE

## (undated) DEVICE — SKIN AFFIX SURG ADHESIVE 72/CS 0.55ML: Brand: MEDLINE

## (undated) DEVICE — DRAPE,UNDERBUTTOCKS,PCH,STERILE: Brand: MEDLINE

## (undated) DEVICE — SUT SILK 2/0 PS 18IN 1588H

## (undated) DEVICE — SUT VIC 0 CTD BR 18IN UNDYE VCP724D

## (undated) DEVICE — APPL HEMOS FOR DELIVERY FLOSEAL

## (undated) DEVICE — LAP PORT CLOSURE GUIDES 5MM AND 10/12MM: Brand: LAP PORT CLOSURE GUIDES 5MM AND 10/12MM

## (undated) DEVICE — GLV SURG SENSICARE PI ORTHO PF SZ7 LF STRL

## (undated) DEVICE — CULT ANAERB

## (undated) DEVICE — MEDI-VAC YANKAUER SUCTION HANDLE W/BULBOUS TIP: Brand: CARDINAL HEALTH

## (undated) DEVICE — COVER,MAYO STAND,XL,STERILE: Brand: MEDLINE

## (undated) DEVICE — GLV SURG SENSICARE PI MIC PF SZ6.5 LF STRL

## (undated) DEVICE — Device

## (undated) DEVICE — ROD OS LP SURFIT 2 1/2IN

## (undated) DEVICE — SPNG LAP PREWSH SFTPK 18X18IN STRL PK/5

## (undated) DEVICE — [HIGH FLOW INSUFFLATOR,  DO NOT USE IF PACKAGE IS DAMAGED,  KEEP DRY,  KEEP AWAY FROM SUNLIGHT,  PROTECT FROM HEAT AND RADIOACTIVE SOURCES.]: Brand: PNEUMOSURE

## (undated) DEVICE — SUT PDS 1 CTX 36IN VIO PDP371T

## (undated) DEVICE — ENDOPATH XCEL UNIVERSAL TROCAR STABLILITY SLEEVES: Brand: ENDOPATH XCEL

## (undated) DEVICE — SUT MNCRYL PLS ANTIB UD 4/0 PS2 18IN

## (undated) DEVICE — TOWEL,OR,DSP,ST,BLUE,STD,4/PK,20PK/CS: Brand: MEDLINE

## (undated) DEVICE — JP PERF DRN SIL FLT 10MM FULL: Brand: CARDINAL HEALTH

## (undated) DEVICE — ACCESS PLATFORM FOR MINIMALLY INVASIVE SURGERY.: Brand: GELPORT® LAPAROSCOPIC  SYSTEM

## (undated) DEVICE — ENDOPATH XCEL BLADELESS TROCARS WITH STABILITY SLEEVES: Brand: ENDOPATH XCEL

## (undated) DEVICE — VISUALIZATION SYSTEM: Brand: CLEARIFY

## (undated) DEVICE — LEGGINGS, PAIR, 29X43, STERILE: Brand: MEDLINE

## (undated) DEVICE — MARYLAND JAW LAPAROSCOPIC SEALER/DIVIDER COATED: Brand: LIGASURE

## (undated) DEVICE — GOWN,REINF,POLY,ECL,PP SLV,XL: Brand: MEDLINE

## (undated) DEVICE — 2, DISPOSABLE SUCTION/IRRIGATOR WITHOUT DISPOSABLE TIP: Brand: STRYKEFLOW

## (undated) DEVICE — 3M™ IOBAN™ 2 ANTIMICROBIAL INCISE DRAPE 6650EZ: Brand: IOBAN™ 2

## (undated) DEVICE — MEDI-VAC YANKAUER SUCTION HANDLE: Brand: CARDINAL HEALTH

## (undated) DEVICE — INTENDED FOR TISSUE SEPARATION, AND OTHER PROCEDURES THAT REQUIRE A SHARP SURGICAL BLADE TO PUNCTURE OR CUT.: Brand: BARD-PARKER ® STAINLESS STEEL BLADES

## (undated) DEVICE — SUT PROLN 2/0 PC3 8833H

## (undated) DEVICE — TP UMB COTN 30IN U11T

## (undated) DEVICE — APPL CHLORAPREP TINTED 26ML TEAL

## (undated) DEVICE — KT POSTN TRENDELENBURG NBXL PAD WING STRAP TABL HDRST XLG

## (undated) DEVICE — PK LAP LASR CHOLE 10

## (undated) DEVICE — SUCTION RESERVOIR 400CC LG VOL: Brand: CARDINAL HEALTH

## (undated) DEVICE — PROXIMATE RH ROTATING HEAD SKIN STAPLERS (35 WIDE) CONTAINS 35 STAINLESS STEEL STAPLES: Brand: PROXIMATE

## (undated) DEVICE — THE ECHELON FLEX POWERED PLUS ARTICULATING ENDOSCOPIC LINEAR CUTTERS ARE STERILE, SINGLE PATIENT USE INSTRUMENTS THAT SIMULTANEOUSLYCUT AND STAPLE TISSUE. THERE ARE SIX STAGGERED ROWS OF STAPLES, THREE ON EITHER SIDE OF THE CUT LINE. THE ECHELON FLEX 45 POWERED PLUSINSTRUMENTS HAVE A STAPLE LINE THAT IS APPROXIMATELY 45 MM LONG AND A CUT LINE THAT IS APPROXIMATELY 42 MM LONG. THE SHAFT CAN ROTATE FREELYIN BOTH DIRECTIONS AND AN ARTICULATION MECHANISM ENABLES THE DISTAL PORTION OF THE SHAFT TO PIVOT TO FACILITATE LATERAL ACCESS TO THE OPERATIVESITE.THE INSTRUMENTS ARE PACKAGED WITH A PRIMARY LITHIUM BATTERY PACK THAT MUST BE INSTALLED PRIOR TO USE. THERE ARE SPECIFIC REQUIREMENTS FORDISPOSING OF THE BATTERY PACK. REFER TO THE BATTERY PACK DISPOSAL SECTION.THE INSTRUMENTS ARE PACKAGED WITHOUT A RELOAD AND MUST BE LOADED PRIOR TO USE. A STAPLE RETAINING CAP ON THE RELOAD PROTECTS THE STAPLE LEGPOINTS DURING SHIPPING AND TRANSPORTATION. THE INSTRUMENTS’ LOCK-OUT FEATURE IS DESIGNED TO PREVENT A USED OR IMPROPERLY INSTALLED RELOADFROM BEING REFIRED OR AN INSTRUMENT FROM BEING FIRED WITHOUT A RELOAD.: Brand: ECHELON FLEX

## (undated) DEVICE — CVR HNDL LIGHT RIGID

## (undated) DEVICE — SUT VIC 0 TIES 18IN J912G

## (undated) DEVICE — PREMIUM DRY TRAY LF: Brand: MEDLINE INDUSTRIES, INC.